# Patient Record
Sex: FEMALE | Race: WHITE
[De-identification: names, ages, dates, MRNs, and addresses within clinical notes are randomized per-mention and may not be internally consistent; named-entity substitution may affect disease eponyms.]

---

## 2020-04-22 ENCOUNTER — HOSPITAL ENCOUNTER (EMERGENCY)
Dept: HOSPITAL 8 - ED | Age: 75
Discharge: HOME | End: 2020-04-22
Payer: COMMERCIAL

## 2020-04-22 VITALS — BODY MASS INDEX: 19.92 KG/M2 | WEIGHT: 108.25 LBS | HEIGHT: 62 IN

## 2020-04-22 VITALS — SYSTOLIC BLOOD PRESSURE: 176 MMHG | DIASTOLIC BLOOD PRESSURE: 77 MMHG

## 2020-04-22 DIAGNOSIS — J44.1: Primary | ICD-10-CM

## 2020-04-22 DIAGNOSIS — R05: ICD-10-CM

## 2020-04-22 LAB
ALBUMIN SERPL-MCNC: 3.7 G/DL (ref 3.4–5)
ANION GAP SERPL CALC-SCNC: 6 MMOL/L (ref 5–15)
BASOPHILS # BLD AUTO: 0.04 X10^3/UL (ref 0–0.1)
BASOPHILS NFR BLD AUTO: 1 % (ref 0–1)
CALCIUM SERPL-MCNC: 9.5 MG/DL (ref 8.5–10.1)
CHLORIDE SERPL-SCNC: 111 MMOL/L (ref 98–107)
CREAT SERPL-MCNC: 1.65 MG/DL (ref 0.55–1.02)
EOSINOPHIL # BLD AUTO: 0.03 X10^3/UL (ref 0–0.4)
EOSINOPHIL NFR BLD AUTO: 0 % (ref 1–7)
ERYTHROCYTE [DISTWIDTH] IN BLOOD BY AUTOMATED COUNT: 16 % (ref 9.6–15.2)
LYMPHOCYTES # BLD AUTO: 1.93 X10^3/UL (ref 1–3.4)
LYMPHOCYTES NFR BLD AUTO: 23 % (ref 22–44)
MCH RBC QN AUTO: 32.1 PG (ref 27–34.8)
MCHC RBC AUTO-ENTMCNC: 33.3 G/DL (ref 32.4–35.8)
MCV RBC AUTO: 96.2 FL (ref 80–100)
MD: NO
MONOCYTES # BLD AUTO: 0.57 X10^3/UL (ref 0.2–0.8)
MONOCYTES NFR BLD AUTO: 7 % (ref 2–9)
NEUTROPHILS # BLD AUTO: 5.7 X10^3/UL (ref 1.8–6.8)
NEUTROPHILS NFR BLD AUTO: 69 % (ref 42–75)
PLATELET # BLD AUTO: 173 X10^3/UL (ref 130–400)
PMV BLD AUTO: 9 FL (ref 7.4–10.4)
RBC # BLD AUTO: 4.41 X10^6/UL (ref 3.82–5.3)

## 2020-04-22 PROCEDURE — 99284 EMERGENCY DEPT VISIT MOD MDM: CPT

## 2020-04-22 PROCEDURE — 80048 BASIC METABOLIC PNL TOTAL CA: CPT

## 2020-04-22 PROCEDURE — 71045 X-RAY EXAM CHEST 1 VIEW: CPT

## 2020-04-22 PROCEDURE — 83605 ASSAY OF LACTIC ACID: CPT

## 2020-04-22 PROCEDURE — 82040 ASSAY OF SERUM ALBUMIN: CPT

## 2020-04-22 PROCEDURE — 85025 COMPLETE CBC W/AUTO DIFF WBC: CPT

## 2020-04-22 PROCEDURE — 36415 COLL VENOUS BLD VENIPUNCTURE: CPT

## 2020-04-22 NOTE — NUR
LAB AT BS.  PT C/O PAIN TO LT FOOT, "IT'S UGLY".  FOOT SLIGHTLY DISCOLORED, 
PEDAL PULSE DIMINISHED, SKIN INTACT.  PT DENIES TRAUMA.  CONGESTED COUGH NOTED 
- REPORTS COUGH X 3-4 DAYS.  RESP EVEN & UNLABORED, SPEECH CLEAR, ABLE TO SPEAK 
IN COMPLETE SENTENCES.  XR NOW AT BS.

## 2020-05-22 ENCOUNTER — HOSPITAL ENCOUNTER (INPATIENT)
Dept: HOSPITAL 8 - ED | Age: 75
LOS: 6 days | Discharge: HOME | DRG: 270 | End: 2020-05-28
Attending: FAMILY MEDICINE | Admitting: HOSPITALIST
Payer: MEDICARE

## 2020-05-22 VITALS — HEIGHT: 61 IN | WEIGHT: 130.07 LBS | BODY MASS INDEX: 24.56 KG/M2

## 2020-05-22 VITALS — DIASTOLIC BLOOD PRESSURE: 80 MMHG | SYSTOLIC BLOOD PRESSURE: 163 MMHG

## 2020-05-22 DIAGNOSIS — J44.9: ICD-10-CM

## 2020-05-22 DIAGNOSIS — I70.202: Primary | ICD-10-CM

## 2020-05-22 DIAGNOSIS — I10: ICD-10-CM

## 2020-05-22 DIAGNOSIS — E46: ICD-10-CM

## 2020-05-22 DIAGNOSIS — R41.0: ICD-10-CM

## 2020-05-22 DIAGNOSIS — I70.8: ICD-10-CM

## 2020-05-22 DIAGNOSIS — L03.116: ICD-10-CM

## 2020-05-22 DIAGNOSIS — Z82.49: ICD-10-CM

## 2020-05-22 DIAGNOSIS — N17.0: ICD-10-CM

## 2020-05-22 DIAGNOSIS — K21.9: ICD-10-CM

## 2020-05-22 DIAGNOSIS — Z66: ICD-10-CM

## 2020-05-22 DIAGNOSIS — E78.5: ICD-10-CM

## 2020-05-22 DIAGNOSIS — Z82.5: ICD-10-CM

## 2020-05-22 DIAGNOSIS — Z20.828: ICD-10-CM

## 2020-05-22 DIAGNOSIS — I74.5: ICD-10-CM

## 2020-05-22 DIAGNOSIS — Z88.0: ICD-10-CM

## 2020-05-22 DIAGNOSIS — Z98.49: ICD-10-CM
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
## 2020-05-22 DIAGNOSIS — F17.210: ICD-10-CM

## 2020-05-22 DIAGNOSIS — Z88.5: ICD-10-CM

## 2020-05-22 LAB
ALBUMIN SERPL-MCNC: 3.5 G/DL (ref 3.4–5)
ANION GAP SERPL CALC-SCNC: 6 MMOL/L (ref 5–15)
BASOPHILS # BLD AUTO: 0.04 X10^3/UL (ref 0–0.1)
BASOPHILS NFR BLD AUTO: 1 % (ref 0–1)
CALCIUM SERPL-MCNC: 9.2 MG/DL (ref 8.5–10.1)
CHLORIDE SERPL-SCNC: 109 MMOL/L (ref 98–107)
CHOL/HDL RATIO: 2.7
CREAT SERPL-MCNC: 1.28 MG/DL (ref 0.55–1.02)
EOSINOPHIL # BLD AUTO: 0.07 X10^3/UL (ref 0–0.4)
EOSINOPHIL NFR BLD AUTO: 1 % (ref 1–7)
ERYTHROCYTE [DISTWIDTH] IN BLOOD BY AUTOMATED COUNT: 17.3 % (ref 9.6–15.2)
HDL CHOL %: 37 % (ref 28–40)
HDL CHOLESTEROL (DIRECT): 58 MG/DL (ref 40–60)
LDL CHOLESTEROL,CALCULATED: 56 MG/DL (ref 54–169)
LDLC/HDLC SERPL: 1 {RATIO} (ref 0.5–3)
LYMPHOCYTES # BLD AUTO: 1.86 X10^3/UL (ref 1–3.4)
LYMPHOCYTES NFR BLD AUTO: 27 % (ref 22–44)
MCH RBC QN AUTO: 32.5 PG (ref 27–34.8)
MCHC RBC AUTO-ENTMCNC: 33.4 G/DL (ref 32.4–35.8)
MCV RBC AUTO: 97.5 FL (ref 80–100)
MD: NO
MONOCYTES # BLD AUTO: 0.45 X10^3/UL (ref 0.2–0.8)
MONOCYTES NFR BLD AUTO: 7 % (ref 2–9)
NEUTROPHILS # BLD AUTO: 4.56 X10^3/UL (ref 1.8–6.8)
NEUTROPHILS NFR BLD AUTO: 65 % (ref 42–75)
PLATELET # BLD AUTO: 282 X10^3/UL (ref 130–400)
PMV BLD AUTO: 9 FL (ref 7.4–10.4)
RBC # BLD AUTO: 4.32 X10^6/UL (ref 3.82–5.3)
TRIGL SERPL-MCNC: 215 MG/DL (ref 50–200)
VLDLC SERPL CALC-MCNC: 43 MG/DL (ref 0–25)

## 2020-05-22 PROCEDURE — 83605 ASSAY OF LACTIC ACID: CPT

## 2020-05-22 PROCEDURE — 88305 TISSUE EXAM BY PATHOLOGIST: CPT

## 2020-05-22 PROCEDURE — 37236 OPEN/PERQ PLACE STENT 1ST: CPT

## 2020-05-22 PROCEDURE — 37221: CPT

## 2020-05-22 PROCEDURE — 85520 HEPARIN ASSAY: CPT

## 2020-05-22 PROCEDURE — C1725 CATH, TRANSLUMIN NON-LASER: HCPCS

## 2020-05-22 PROCEDURE — 84145 PROCALCITONIN (PCT): CPT

## 2020-05-22 PROCEDURE — 80069 RENAL FUNCTION PANEL: CPT

## 2020-05-22 PROCEDURE — 93922 UPR/L XTREMITY ART 2 LEVELS: CPT

## 2020-05-22 PROCEDURE — C1768 GRAFT, VASCULAR: HCPCS

## 2020-05-22 PROCEDURE — C1894 INTRO/SHEATH, NON-LASER: HCPCS

## 2020-05-22 PROCEDURE — 82040 ASSAY OF SERUM ALBUMIN: CPT

## 2020-05-22 PROCEDURE — 85025 COMPLETE CBC W/AUTO DIFF WBC: CPT

## 2020-05-22 PROCEDURE — 96365 THER/PROPH/DIAG IV INF INIT: CPT

## 2020-05-22 PROCEDURE — C1751 CATH, INF, PER/CENT/MIDLINE: HCPCS

## 2020-05-22 PROCEDURE — 36415 COLL VENOUS BLD VENIPUNCTURE: CPT

## 2020-05-22 PROCEDURE — 85347 COAGULATION TIME ACTIVATED: CPT

## 2020-05-22 PROCEDURE — 80048 BASIC METABOLIC PNL TOTAL CA: CPT

## 2020-05-22 PROCEDURE — 87040 BLOOD CULTURE FOR BACTERIA: CPT

## 2020-05-22 PROCEDURE — 96372 THER/PROPH/DIAG INJ SC/IM: CPT

## 2020-05-22 PROCEDURE — 75635 CT ANGIO ABDOMINAL ARTERIES: CPT

## 2020-05-22 PROCEDURE — C1769 GUIDE WIRE: HCPCS

## 2020-05-22 PROCEDURE — 86850 RBC ANTIBODY SCREEN: CPT

## 2020-05-22 PROCEDURE — 93925 LOWER EXTREMITY STUDY: CPT

## 2020-05-22 PROCEDURE — 80061 LIPID PANEL: CPT

## 2020-05-22 PROCEDURE — 96375 TX/PRO/DX INJ NEW DRUG ADDON: CPT

## 2020-05-22 PROCEDURE — C1876 STENT, NON-COA/NON-COV W/DEL: HCPCS

## 2020-05-22 PROCEDURE — 86900 BLOOD TYPING SEROLOGIC ABO: CPT

## 2020-05-22 PROCEDURE — C1874 STENT, COATED/COV W/DEL SYS: HCPCS

## 2020-05-22 PROCEDURE — 99285 EMERGENCY DEPT VISIT HI MDM: CPT

## 2020-05-22 PROCEDURE — 83735 ASSAY OF MAGNESIUM: CPT

## 2020-05-22 PROCEDURE — C1757 CATH, THROMBECTOMY/EMBOLECT: HCPCS

## 2020-05-22 PROCEDURE — 93005 ELECTROCARDIOGRAM TRACING: CPT

## 2020-05-22 PROCEDURE — 96376 TX/PRO/DX INJ SAME DRUG ADON: CPT

## 2020-05-22 PROCEDURE — 80053 COMPREHEN METABOLIC PANEL: CPT

## 2020-05-22 RX ADMIN — MEROPENEM SCH MLS/HR: 1 INJECTION, POWDER, FOR SOLUTION INTRAVENOUS at 18:07

## 2020-05-22 RX ADMIN — SODIUM CHLORIDE SCH MLS/HR: 0.9 INJECTION, SOLUTION INTRAVENOUS at 17:21

## 2020-05-22 RX ADMIN — MORPHINE SULFATE PRN MG: 4 INJECTION INTRAVENOUS at 15:00

## 2020-05-22 RX ADMIN — MORPHINE SULFATE PRN MG: 4 INJECTION INTRAVENOUS at 16:23

## 2020-05-22 RX ADMIN — HEPARIN SODIUM PRN MLS/HR: 10000 INJECTION, SOLUTION INTRAVENOUS at 22:38

## 2020-05-22 RX ADMIN — NICOTINE SCH PATCH: 21 PATCH, EXTENDED RELEASE TRANSDERMAL at 17:16

## 2020-05-23 VITALS — DIASTOLIC BLOOD PRESSURE: 72 MMHG | SYSTOLIC BLOOD PRESSURE: 175 MMHG

## 2020-05-23 VITALS — DIASTOLIC BLOOD PRESSURE: 67 MMHG | SYSTOLIC BLOOD PRESSURE: 160 MMHG

## 2020-05-23 VITALS — DIASTOLIC BLOOD PRESSURE: 67 MMHG | SYSTOLIC BLOOD PRESSURE: 152 MMHG

## 2020-05-23 VITALS — DIASTOLIC BLOOD PRESSURE: 70 MMHG | SYSTOLIC BLOOD PRESSURE: 175 MMHG

## 2020-05-23 LAB
ALBUMIN SERPL-MCNC: 2.8 G/DL (ref 3.4–5)
ALP SERPL-CCNC: 65 U/L (ref 45–117)
ALT SERPL-CCNC: 16 U/L (ref 12–78)
ANION GAP SERPL CALC-SCNC: 8 MMOL/L (ref 5–15)
BASOPHILS # BLD AUTO: 0.06 X10^3/UL (ref 0–0.1)
BASOPHILS NFR BLD AUTO: 1 % (ref 0–1)
BILIRUB SERPL-MCNC: 0.4 MG/DL (ref 0.2–1)
CALCIUM SERPL-MCNC: 8.4 MG/DL (ref 8.5–10.1)
CHLORIDE SERPL-SCNC: 113 MMOL/L (ref 98–107)
CREAT SERPL-MCNC: 1.07 MG/DL (ref 0.55–1.02)
EOSINOPHIL # BLD AUTO: 0.1 X10^3/UL (ref 0–0.4)
EOSINOPHIL NFR BLD AUTO: 1 % (ref 1–7)
ERYTHROCYTE [DISTWIDTH] IN BLOOD BY AUTOMATED COUNT: 17 % (ref 9.6–15.2)
LYMPHOCYTES # BLD AUTO: 1.75 X10^3/UL (ref 1–3.4)
LYMPHOCYTES NFR BLD AUTO: 25 % (ref 22–44)
MCH RBC QN AUTO: 32.5 PG (ref 27–34.8)
MCHC RBC AUTO-ENTMCNC: 32.8 G/DL (ref 32.4–35.8)
MCV RBC AUTO: 99 FL (ref 80–100)
MD: NO
MONOCYTES # BLD AUTO: 0.53 X10^3/UL (ref 0.2–0.8)
MONOCYTES NFR BLD AUTO: 8 % (ref 2–9)
NEUTROPHILS # BLD AUTO: 4.6 X10^3/UL (ref 1.8–6.8)
NEUTROPHILS NFR BLD AUTO: 65 % (ref 42–75)
PLATELET # BLD AUTO: 224 X10^3/UL (ref 130–400)
PMV BLD AUTO: 8.4 FL (ref 7.4–10.4)
PROT SERPL-MCNC: 6.5 G/DL (ref 6.4–8.2)
RBC # BLD AUTO: 3.76 X10^6/UL (ref 3.82–5.3)

## 2020-05-23 RX ADMIN — OXYCODONE HYDROCHLORIDE PRN MG: 5 TABLET ORAL at 15:41

## 2020-05-23 RX ADMIN — NICOTINE SCH PATCH: 21 PATCH, EXTENDED RELEASE TRANSDERMAL at 16:53

## 2020-05-23 RX ADMIN — ATORVASTATIN CALCIUM SCH MG: 40 TABLET, FILM COATED ORAL at 21:09

## 2020-05-23 RX ADMIN — PAROXETINE HYDROCHLORIDE HEMIHYDRATE SCH MG: 20 TABLET, FILM COATED ORAL at 07:44

## 2020-05-23 RX ADMIN — AMLODIPINE BESYLATE SCH MG: 10 TABLET ORAL at 08:47

## 2020-05-23 RX ADMIN — OXYCODONE HYDROCHLORIDE PRN MG: 5 TABLET ORAL at 21:33

## 2020-05-23 RX ADMIN — OMEPRAZOLE SCH MG: 20 CAPSULE, DELAYED RELEASE ORAL at 07:44

## 2020-05-23 RX ADMIN — MEROPENEM SCH MLS/HR: 1 INJECTION, POWDER, FOR SOLUTION INTRAVENOUS at 02:29

## 2020-05-23 RX ADMIN — MEROPENEM SCH MLS/HR: 1 INJECTION, POWDER, FOR SOLUTION INTRAVENOUS at 17:28

## 2020-05-23 RX ADMIN — MORPHINE SULFATE PRN MG: 10 INJECTION INTRAVENOUS at 04:47

## 2020-05-23 RX ADMIN — SODIUM CHLORIDE SCH MLS/HR: 0.9 INJECTION, SOLUTION INTRAVENOUS at 16:53

## 2020-05-23 RX ADMIN — MEROPENEM SCH MLS/HR: 1 INJECTION, POWDER, FOR SOLUTION INTRAVENOUS at 10:09

## 2020-05-23 RX ADMIN — SODIUM CHLORIDE SCH MLS/HR: 0.9 INJECTION, SOLUTION INTRAVENOUS at 05:46

## 2020-05-23 RX ADMIN — MORPHINE SULFATE PRN MG: 10 INJECTION INTRAVENOUS at 07:57

## 2020-05-23 RX ADMIN — HEPARIN SODIUM PRN UNITS: 5000 INJECTION, SOLUTION INTRAVENOUS; SUBCUTANEOUS at 21:08

## 2020-05-23 RX ADMIN — HEPARIN SODIUM PRN UNITS: 5000 INJECTION, SOLUTION INTRAVENOUS; SUBCUTANEOUS at 06:19

## 2020-05-24 VITALS — DIASTOLIC BLOOD PRESSURE: 70 MMHG | SYSTOLIC BLOOD PRESSURE: 177 MMHG

## 2020-05-24 VITALS — SYSTOLIC BLOOD PRESSURE: 192 MMHG | DIASTOLIC BLOOD PRESSURE: 64 MMHG

## 2020-05-24 VITALS — SYSTOLIC BLOOD PRESSURE: 128 MMHG | DIASTOLIC BLOOD PRESSURE: 71 MMHG

## 2020-05-24 VITALS — DIASTOLIC BLOOD PRESSURE: 61 MMHG | SYSTOLIC BLOOD PRESSURE: 160 MMHG

## 2020-05-24 VITALS — SYSTOLIC BLOOD PRESSURE: 117 MMHG | DIASTOLIC BLOOD PRESSURE: 61 MMHG

## 2020-05-24 LAB
ALBUMIN SERPL-MCNC: 2.7 G/DL (ref 3.4–5)
ALP SERPL-CCNC: 63 U/L (ref 45–117)
ALT SERPL-CCNC: 13 U/L (ref 12–78)
ANION GAP SERPL CALC-SCNC: 8 MMOL/L (ref 5–15)
BASOPHILS # BLD AUTO: 0.02 X10^3/UL (ref 0–0.1)
BASOPHILS NFR BLD AUTO: 0 % (ref 0–1)
BILIRUB SERPL-MCNC: 0.5 MG/DL (ref 0.2–1)
CALCIUM SERPL-MCNC: 8.4 MG/DL (ref 8.5–10.1)
CHLORIDE SERPL-SCNC: 111 MMOL/L (ref 98–107)
CREAT SERPL-MCNC: 0.94 MG/DL (ref 0.55–1.02)
EOSINOPHIL # BLD AUTO: 0.1 X10^3/UL (ref 0–0.4)
EOSINOPHIL NFR BLD AUTO: 1 % (ref 1–7)
ERYTHROCYTE [DISTWIDTH] IN BLOOD BY AUTOMATED COUNT: 16.3 % (ref 9.6–15.2)
LYMPHOCYTES # BLD AUTO: 1.37 X10^3/UL (ref 1–3.4)
LYMPHOCYTES NFR BLD AUTO: 19 % (ref 22–44)
MCH RBC QN AUTO: 32.4 PG (ref 27–34.8)
MCHC RBC AUTO-ENTMCNC: 33.5 G/DL (ref 32.4–35.8)
MCV RBC AUTO: 96.7 FL (ref 80–100)
MD: NO
MONOCYTES # BLD AUTO: 0.55 X10^3/UL (ref 0.2–0.8)
MONOCYTES NFR BLD AUTO: 8 % (ref 2–9)
NEUTROPHILS # BLD AUTO: 5 X10^3/UL (ref 1.8–6.8)
NEUTROPHILS NFR BLD AUTO: 71 % (ref 42–75)
PLATELET # BLD AUTO: 231 X10^3/UL (ref 130–400)
PMV BLD AUTO: 8.6 FL (ref 7.4–10.4)
PROT SERPL-MCNC: 6.1 G/DL (ref 6.4–8.2)
RBC # BLD AUTO: 3.71 X10^6/UL (ref 3.82–5.3)

## 2020-05-24 RX ADMIN — HEPARIN SODIUM PRN UNITS: 5000 INJECTION, SOLUTION INTRAVENOUS; SUBCUTANEOUS at 03:33

## 2020-05-24 RX ADMIN — OXYCODONE HYDROCHLORIDE PRN MG: 5 TABLET ORAL at 14:31

## 2020-05-24 RX ADMIN — NICOTINE SCH PATCH: 21 PATCH, EXTENDED RELEASE TRANSDERMAL at 16:55

## 2020-05-24 RX ADMIN — HEPARIN SODIUM PRN MLS/HR: 10000 INJECTION, SOLUTION INTRAVENOUS at 04:31

## 2020-05-24 RX ADMIN — SODIUM CHLORIDE SCH MLS/HR: 0.9 INJECTION, SOLUTION INTRAVENOUS at 22:09

## 2020-05-24 RX ADMIN — SODIUM CHLORIDE SCH MLS/HR: 0.9 INJECTION, SOLUTION INTRAVENOUS at 02:11

## 2020-05-24 RX ADMIN — MEROPENEM SCH MLS/HR: 1 INJECTION, POWDER, FOR SOLUTION INTRAVENOUS at 22:05

## 2020-05-24 RX ADMIN — OMEPRAZOLE SCH MG: 20 CAPSULE, DELAYED RELEASE ORAL at 08:30

## 2020-05-24 RX ADMIN — PAROXETINE HYDROCHLORIDE HEMIHYDRATE SCH MG: 20 TABLET, FILM COATED ORAL at 08:31

## 2020-05-24 RX ADMIN — HEPARIN SODIUM PRN UNITS: 5000 INJECTION, SOLUTION INTRAVENOUS; SUBCUTANEOUS at 16:51

## 2020-05-24 RX ADMIN — SODIUM CHLORIDE SCH MLS/HR: 0.9 INJECTION, SOLUTION INTRAVENOUS at 12:25

## 2020-05-24 RX ADMIN — AMLODIPINE BESYLATE SCH MG: 10 TABLET ORAL at 08:30

## 2020-05-24 RX ADMIN — MEROPENEM SCH MLS/HR: 1 INJECTION, POWDER, FOR SOLUTION INTRAVENOUS at 02:11

## 2020-05-24 RX ADMIN — OXYCODONE HYDROCHLORIDE PRN MG: 5 TABLET ORAL at 20:54

## 2020-05-24 RX ADMIN — ATORVASTATIN CALCIUM SCH MG: 40 TABLET, FILM COATED ORAL at 20:54

## 2020-05-24 RX ADMIN — MEROPENEM SCH MLS/HR: 1 INJECTION, POWDER, FOR SOLUTION INTRAVENOUS at 10:20

## 2020-05-25 VITALS — DIASTOLIC BLOOD PRESSURE: 65 MMHG | SYSTOLIC BLOOD PRESSURE: 191 MMHG

## 2020-05-25 VITALS — DIASTOLIC BLOOD PRESSURE: 67 MMHG | SYSTOLIC BLOOD PRESSURE: 127 MMHG

## 2020-05-25 VITALS — SYSTOLIC BLOOD PRESSURE: 151 MMHG | DIASTOLIC BLOOD PRESSURE: 74 MMHG

## 2020-05-25 VITALS — SYSTOLIC BLOOD PRESSURE: 127 MMHG | DIASTOLIC BLOOD PRESSURE: 74 MMHG

## 2020-05-25 VITALS — SYSTOLIC BLOOD PRESSURE: 149 MMHG | DIASTOLIC BLOOD PRESSURE: 70 MMHG

## 2020-05-25 LAB
ALBUMIN SERPL-MCNC: 2.9 G/DL (ref 3.4–5)
ALP SERPL-CCNC: 66 U/L (ref 45–117)
ALT SERPL-CCNC: 18 U/L (ref 12–78)
ANION GAP SERPL CALC-SCNC: 7 MMOL/L (ref 5–15)
BASOPHILS # BLD AUTO: 0.04 X10^3/UL (ref 0–0.1)
BASOPHILS NFR BLD AUTO: 1 % (ref 0–1)
BILIRUB SERPL-MCNC: 0.7 MG/DL (ref 0.2–1)
CALCIUM SERPL-MCNC: 8.5 MG/DL (ref 8.5–10.1)
CHLORIDE SERPL-SCNC: 112 MMOL/L (ref 98–107)
CREAT SERPL-MCNC: 1 MG/DL (ref 0.55–1.02)
EOSINOPHIL # BLD AUTO: 0.16 X10^3/UL (ref 0–0.4)
EOSINOPHIL NFR BLD AUTO: 2 % (ref 1–7)
ERYTHROCYTE [DISTWIDTH] IN BLOOD BY AUTOMATED COUNT: 17 % (ref 9.6–15.2)
LYMPHOCYTES # BLD AUTO: 2.08 X10^3/UL (ref 1–3.4)
LYMPHOCYTES NFR BLD AUTO: 27 % (ref 22–44)
MCH RBC QN AUTO: 32.2 PG (ref 27–34.8)
MCHC RBC AUTO-ENTMCNC: 32.9 G/DL (ref 32.4–35.8)
MCV RBC AUTO: 97.7 FL (ref 80–100)
MD: NO
MONOCYTES # BLD AUTO: 0.64 X10^3/UL (ref 0.2–0.8)
MONOCYTES NFR BLD AUTO: 8 % (ref 2–9)
NEUTROPHILS # BLD AUTO: 4.86 X10^3/UL (ref 1.8–6.8)
NEUTROPHILS NFR BLD AUTO: 63 % (ref 42–75)
PLATELET # BLD AUTO: 246 X10^3/UL (ref 130–400)
PMV BLD AUTO: 9 FL (ref 7.4–10.4)
PROT SERPL-MCNC: 6.6 G/DL (ref 6.4–8.2)
RBC # BLD AUTO: 3.74 X10^6/UL (ref 3.82–5.3)

## 2020-05-25 PROCEDURE — 04UK0KZ SUPPLEMENT RIGHT FEMORAL ARTERY WITH NONAUTOLOGOUS TISSUE SUBSTITUTE, OPEN APPROACH: ICD-10-PCS | Performed by: SURGERY

## 2020-05-25 PROCEDURE — 04CE0ZZ EXTIRPATION OF MATTER FROM RIGHT INTERNAL ILIAC ARTERY, OPEN APPROACH: ICD-10-PCS | Performed by: SURGERY

## 2020-05-25 PROCEDURE — 047H3DZ DILATION OF RIGHT EXTERNAL ILIAC ARTERY WITH INTRALUMINAL DEVICE, PERCUTANEOUS APPROACH: ICD-10-PCS | Performed by: SURGERY

## 2020-05-25 PROCEDURE — 04CC0ZZ EXTIRPATION OF MATTER FROM RIGHT COMMON ILIAC ARTERY, OPEN APPROACH: ICD-10-PCS | Performed by: SURGERY

## 2020-05-25 PROCEDURE — B44LZZ3 ULTRASONOGRAPHY OF FEMORAL ARTERY, INTRAVASCULAR: ICD-10-PCS | Performed by: SURGERY

## 2020-05-25 PROCEDURE — 04CK0ZZ EXTIRPATION OF MATTER FROM RIGHT FEMORAL ARTERY, OPEN APPROACH: ICD-10-PCS | Performed by: SURGERY

## 2020-05-25 RX ADMIN — AMLODIPINE BESYLATE SCH MG: 10 TABLET ORAL at 09:00

## 2020-05-25 RX ADMIN — SODIUM CHLORIDE SCH MLS/HR: 0.9 INJECTION, SOLUTION INTRAVENOUS at 17:21

## 2020-05-25 RX ADMIN — NICOTINE SCH PATCH: 21 PATCH, EXTENDED RELEASE TRANSDERMAL at 17:21

## 2020-05-25 RX ADMIN — SODIUM CHLORIDE SCH MLS/HR: 0.9 INJECTION, SOLUTION INTRAVENOUS at 15:49

## 2020-05-25 RX ADMIN — CLOPIDOGREL SCH MG: 75 TABLET, FILM COATED ORAL at 17:21

## 2020-05-25 RX ADMIN — OMEPRAZOLE SCH MG: 20 CAPSULE, DELAYED RELEASE ORAL at 09:00

## 2020-05-25 RX ADMIN — ATORVASTATIN CALCIUM SCH MG: 40 TABLET, FILM COATED ORAL at 21:53

## 2020-05-25 RX ADMIN — HEPARIN SODIUM PRN MLS/HR: 10000 INJECTION, SOLUTION INTRAVENOUS at 05:39

## 2020-05-25 RX ADMIN — POTASSIUM CHLORIDE SCH MLS/HR: 2 INJECTION, SOLUTION, CONCENTRATE INTRAVENOUS at 18:15

## 2020-05-25 RX ADMIN — PAROXETINE HYDROCHLORIDE HEMIHYDRATE SCH MG: 20 TABLET, FILM COATED ORAL at 09:00

## 2020-05-25 RX ADMIN — OXYCODONE HYDROCHLORIDE PRN MG: 5 TABLET ORAL at 18:56

## 2020-05-25 RX ADMIN — MEROPENEM SCH MLS/HR: 1 INJECTION, POWDER, FOR SOLUTION INTRAVENOUS at 17:22

## 2020-05-26 VITALS — SYSTOLIC BLOOD PRESSURE: 122 MMHG | DIASTOLIC BLOOD PRESSURE: 62 MMHG

## 2020-05-26 VITALS — DIASTOLIC BLOOD PRESSURE: 66 MMHG | SYSTOLIC BLOOD PRESSURE: 122 MMHG

## 2020-05-26 VITALS — SYSTOLIC BLOOD PRESSURE: 120 MMHG | DIASTOLIC BLOOD PRESSURE: 67 MMHG

## 2020-05-26 VITALS — DIASTOLIC BLOOD PRESSURE: 64 MMHG | SYSTOLIC BLOOD PRESSURE: 123 MMHG

## 2020-05-26 LAB
ALBUMIN SERPL-MCNC: 2.3 G/DL (ref 3.4–5)
ANION GAP SERPL CALC-SCNC: 8 MMOL/L (ref 5–15)
BASOPHILS # BLD AUTO: 0.01 X10^3/UL (ref 0–0.1)
BASOPHILS NFR BLD AUTO: 0 % (ref 0–1)
CALCIUM SERPL-MCNC: 7.8 MG/DL (ref 8.5–10.1)
CHLORIDE SERPL-SCNC: 118 MMOL/L (ref 98–107)
CREAT SERPL-MCNC: 0.87 MG/DL (ref 0.55–1.02)
EOSINOPHIL # BLD AUTO: 0.01 X10^3/UL (ref 0–0.4)
EOSINOPHIL NFR BLD AUTO: 0 % (ref 1–7)
ERYTHROCYTE [DISTWIDTH] IN BLOOD BY AUTOMATED COUNT: 17.2 % (ref 9.6–15.2)
LYMPHOCYTES # BLD AUTO: 0.74 X10^3/UL (ref 1–3.4)
LYMPHOCYTES NFR BLD AUTO: 9 % (ref 22–44)
MCH RBC QN AUTO: 33 PG (ref 27–34.8)
MCHC RBC AUTO-ENTMCNC: 33.3 G/DL (ref 32.4–35.8)
MCV RBC AUTO: 99.1 FL (ref 80–100)
MD: (no result)
MONOCYTES # BLD AUTO: 0.38 X10^3/UL (ref 0.2–0.8)
MONOCYTES NFR BLD AUTO: 5 % (ref 2–9)
NEUTROPHILS # BLD AUTO: 7.17 X10^3/UL (ref 1.8–6.8)
NEUTROPHILS NFR BLD AUTO: 86 % (ref 42–75)
PLATELET # BLD AUTO: 199 X10^3/UL (ref 130–400)
PMV BLD AUTO: 8.5 FL (ref 7.4–10.4)
RBC # BLD AUTO: 3.02 X10^6/UL (ref 3.82–5.3)

## 2020-05-26 RX ADMIN — POTASSIUM CHLORIDE SCH MLS/HR: 2 INJECTION, SOLUTION, CONCENTRATE INTRAVENOUS at 18:06

## 2020-05-26 RX ADMIN — OMEPRAZOLE SCH MG: 20 CAPSULE, DELAYED RELEASE ORAL at 08:38

## 2020-05-26 RX ADMIN — OXYCODONE HYDROCHLORIDE PRN MG: 5 TABLET ORAL at 18:53

## 2020-05-26 RX ADMIN — MEROPENEM SCH MLS/HR: 1 INJECTION, POWDER, FOR SOLUTION INTRAVENOUS at 04:57

## 2020-05-26 RX ADMIN — MEROPENEM SCH MLS/HR: 1 INJECTION, POWDER, FOR SOLUTION INTRAVENOUS at 17:09

## 2020-05-26 RX ADMIN — AMLODIPINE BESYLATE SCH MG: 10 TABLET ORAL at 08:38

## 2020-05-26 RX ADMIN — CLOPIDOGREL SCH MG: 75 TABLET, FILM COATED ORAL at 08:39

## 2020-05-26 RX ADMIN — NICOTINE SCH PATCH: 21 PATCH, EXTENDED RELEASE TRANSDERMAL at 17:09

## 2020-05-26 RX ADMIN — OXYCODONE HYDROCHLORIDE PRN MG: 5 TABLET ORAL at 00:37

## 2020-05-26 RX ADMIN — SODIUM CHLORIDE SCH MLS/HR: 0.9 INJECTION, SOLUTION INTRAVENOUS at 18:06

## 2020-05-26 RX ADMIN — ATORVASTATIN CALCIUM SCH MG: 40 TABLET, FILM COATED ORAL at 21:55

## 2020-05-26 RX ADMIN — ENOXAPARIN SODIUM SCH MG: 40 INJECTION SUBCUTANEOUS at 21:55

## 2020-05-26 RX ADMIN — POTASSIUM CHLORIDE SCH MLS/HR: 2 INJECTION, SOLUTION, CONCENTRATE INTRAVENOUS at 06:30

## 2020-05-26 RX ADMIN — OXYCODONE HYDROCHLORIDE PRN MG: 5 TABLET ORAL at 14:02

## 2020-05-26 RX ADMIN — SODIUM CHLORIDE SCH MLS/HR: 0.9 INJECTION, SOLUTION INTRAVENOUS at 06:30

## 2020-05-26 RX ADMIN — PAROXETINE HYDROCHLORIDE HEMIHYDRATE SCH MG: 20 TABLET, FILM COATED ORAL at 08:39

## 2020-05-27 VITALS — DIASTOLIC BLOOD PRESSURE: 71 MMHG | SYSTOLIC BLOOD PRESSURE: 153 MMHG

## 2020-05-27 VITALS — SYSTOLIC BLOOD PRESSURE: 135 MMHG | DIASTOLIC BLOOD PRESSURE: 77 MMHG

## 2020-05-27 VITALS — DIASTOLIC BLOOD PRESSURE: 67 MMHG | SYSTOLIC BLOOD PRESSURE: 135 MMHG

## 2020-05-27 VITALS — SYSTOLIC BLOOD PRESSURE: 154 MMHG | DIASTOLIC BLOOD PRESSURE: 82 MMHG

## 2020-05-27 VITALS — SYSTOLIC BLOOD PRESSURE: 169 MMHG | DIASTOLIC BLOOD PRESSURE: 78 MMHG

## 2020-05-27 RX ADMIN — OXYCODONE HYDROCHLORIDE PRN MG: 5 TABLET ORAL at 13:06

## 2020-05-27 RX ADMIN — ATORVASTATIN CALCIUM SCH MG: 40 TABLET, FILM COATED ORAL at 21:07

## 2020-05-27 RX ADMIN — OXYCODONE HYDROCHLORIDE PRN MG: 5 TABLET ORAL at 05:57

## 2020-05-27 RX ADMIN — MEROPENEM SCH MLS/HR: 1 INJECTION, POWDER, FOR SOLUTION INTRAVENOUS at 16:47

## 2020-05-27 RX ADMIN — SODIUM CHLORIDE SCH MLS/HR: 0.9 INJECTION, SOLUTION INTRAVENOUS at 05:53

## 2020-05-27 RX ADMIN — POTASSIUM CHLORIDE SCH MLS/HR: 2 INJECTION, SOLUTION, CONCENTRATE INTRAVENOUS at 17:58

## 2020-05-27 RX ADMIN — POTASSIUM CHLORIDE SCH MLS/HR: 2 INJECTION, SOLUTION, CONCENTRATE INTRAVENOUS at 09:00

## 2020-05-27 RX ADMIN — OXYCODONE HYDROCHLORIDE PRN MG: 5 TABLET ORAL at 21:07

## 2020-05-27 RX ADMIN — AMLODIPINE BESYLATE SCH MG: 10 TABLET ORAL at 08:35

## 2020-05-27 RX ADMIN — NICOTINE SCH PATCH: 21 PATCH, EXTENDED RELEASE TRANSDERMAL at 16:51

## 2020-05-27 RX ADMIN — SODIUM CHLORIDE SCH MLS/HR: 0.9 INJECTION, SOLUTION INTRAVENOUS at 16:55

## 2020-05-27 RX ADMIN — MEROPENEM SCH MLS/HR: 1 INJECTION, POWDER, FOR SOLUTION INTRAVENOUS at 04:43

## 2020-05-27 RX ADMIN — CLOPIDOGREL SCH MG: 75 TABLET, FILM COATED ORAL at 08:35

## 2020-05-27 RX ADMIN — POTASSIUM CHLORIDE SCH MLS/HR: 2 INJECTION, SOLUTION, CONCENTRATE INTRAVENOUS at 05:53

## 2020-05-27 RX ADMIN — ENOXAPARIN SODIUM SCH MG: 40 INJECTION SUBCUTANEOUS at 21:07

## 2020-05-27 RX ADMIN — OMEPRAZOLE SCH MG: 20 CAPSULE, DELAYED RELEASE ORAL at 08:35

## 2020-05-27 RX ADMIN — PAROXETINE HYDROCHLORIDE HEMIHYDRATE SCH MG: 20 TABLET, FILM COATED ORAL at 08:35

## 2020-05-28 VITALS — DIASTOLIC BLOOD PRESSURE: 70 MMHG | SYSTOLIC BLOOD PRESSURE: 101 MMHG

## 2020-05-28 VITALS — SYSTOLIC BLOOD PRESSURE: 150 MMHG | DIASTOLIC BLOOD PRESSURE: 69 MMHG

## 2020-05-28 VITALS — SYSTOLIC BLOOD PRESSURE: 164 MMHG | DIASTOLIC BLOOD PRESSURE: 83 MMHG

## 2020-05-28 VITALS — DIASTOLIC BLOOD PRESSURE: 73 MMHG | SYSTOLIC BLOOD PRESSURE: 156 MMHG

## 2020-05-28 VITALS — SYSTOLIC BLOOD PRESSURE: 147 MMHG | DIASTOLIC BLOOD PRESSURE: 78 MMHG

## 2020-05-28 LAB
<PLATELET ESTIMATE>: ADEQUATE
ANISOCYTOSIS BLD QL SMEAR: (no result)
ERYTHROCYTE [DISTWIDTH] IN BLOOD BY AUTOMATED COUNT: 16.9 % (ref 9.6–15.2)
GIANT PLATELETS BLD QL SMEAR: (no result)
LG PLATELETS BLD QL SMEAR: (no result)
LYMPH#(MANUAL): 1.78 X10^3/UL (ref 1–3.4)
LYMPHS% (MANUAL): 18 % (ref 22–44)
MCH RBC QN AUTO: 32.8 PG (ref 27–34.8)
MCHC RBC AUTO-ENTMCNC: 33.3 G/DL (ref 32.4–35.8)
MCV RBC AUTO: 98.6 FL (ref 80–100)
MD: YES
MONOS#(MANUAL): 0.3 X10^3/UL (ref 0.3–2.7)
MONOS% (MANUAL): 3 % (ref 2–9)
PLATELET # BLD AUTO: 206 X10^3/UL (ref 130–400)
PMV BLD AUTO: 9 FL (ref 7.4–10.4)
POLYCHROMASIA BLD QL SMEAR: (no result)
RBC # BLD AUTO: 3.13 X10^6/UL (ref 3.82–5.3)
SEG#(MANUAL): 7.82 X10^3/UL (ref 1.8–6.8)
SEGS% (MANUAL): 79 % (ref 42–75)

## 2020-05-28 RX ADMIN — OXYCODONE HYDROCHLORIDE PRN MG: 5 TABLET ORAL at 04:59

## 2020-05-28 RX ADMIN — OMEPRAZOLE SCH MG: 20 CAPSULE, DELAYED RELEASE ORAL at 09:35

## 2020-05-28 RX ADMIN — MEROPENEM SCH MLS/HR: 1 INJECTION, POWDER, FOR SOLUTION INTRAVENOUS at 04:50

## 2020-05-28 RX ADMIN — AMLODIPINE BESYLATE SCH MG: 10 TABLET ORAL at 09:35

## 2020-05-28 RX ADMIN — PAROXETINE HYDROCHLORIDE HEMIHYDRATE SCH MG: 20 TABLET, FILM COATED ORAL at 09:37

## 2020-05-28 RX ADMIN — OXYCODONE HYDROCHLORIDE PRN MG: 5 TABLET ORAL at 09:40

## 2020-05-28 RX ADMIN — POTASSIUM CHLORIDE SCH MLS/HR: 2 INJECTION, SOLUTION, CONCENTRATE INTRAVENOUS at 05:41

## 2020-05-28 RX ADMIN — SODIUM CHLORIDE SCH MLS/HR: 0.9 INJECTION, SOLUTION INTRAVENOUS at 17:34

## 2020-05-28 RX ADMIN — SODIUM CHLORIDE SCH MLS/HR: 0.9 INJECTION, SOLUTION INTRAVENOUS at 08:29

## 2020-05-28 RX ADMIN — NICOTINE SCH PATCH: 21 PATCH, EXTENDED RELEASE TRANSDERMAL at 16:06

## 2020-05-28 RX ADMIN — CLOPIDOGREL SCH MG: 75 TABLET, FILM COATED ORAL at 09:37

## 2020-06-03 ENCOUNTER — HOSPITAL ENCOUNTER (INPATIENT)
Dept: HOSPITAL 8 - ED | Age: 75
LOS: 6 days | Discharge: HOME HEALTH | DRG: 255 | End: 2020-06-09
Attending: INTERNAL MEDICINE | Admitting: HOSPITALIST
Payer: MEDICARE

## 2020-06-03 VITALS — WEIGHT: 102.51 LBS | HEIGHT: 62 IN | BODY MASS INDEX: 18.86 KG/M2

## 2020-06-03 VITALS — DIASTOLIC BLOOD PRESSURE: 59 MMHG | SYSTOLIC BLOOD PRESSURE: 162 MMHG

## 2020-06-03 DIAGNOSIS — N17.0: ICD-10-CM

## 2020-06-03 DIAGNOSIS — I10: ICD-10-CM

## 2020-06-03 DIAGNOSIS — L03.116: ICD-10-CM

## 2020-06-03 DIAGNOSIS — D64.9: ICD-10-CM

## 2020-06-03 DIAGNOSIS — Z88.0: ICD-10-CM

## 2020-06-03 DIAGNOSIS — M86.8X7: ICD-10-CM

## 2020-06-03 DIAGNOSIS — I96: Primary | ICD-10-CM

## 2020-06-03 DIAGNOSIS — L97.524: ICD-10-CM

## 2020-06-03 DIAGNOSIS — Z88.5: ICD-10-CM

## 2020-06-03 DIAGNOSIS — Z98.49: ICD-10-CM

## 2020-06-03 DIAGNOSIS — L03.032: ICD-10-CM

## 2020-06-03 DIAGNOSIS — Z66: ICD-10-CM

## 2020-06-03 DIAGNOSIS — E78.5: ICD-10-CM

## 2020-06-03 DIAGNOSIS — Z82.49: ICD-10-CM

## 2020-06-03 DIAGNOSIS — J44.9: ICD-10-CM

## 2020-06-03 DIAGNOSIS — Z89.429: ICD-10-CM

## 2020-06-03 DIAGNOSIS — F17.210: ICD-10-CM

## 2020-06-03 LAB
ALBUMIN SERPL-MCNC: 3 G/DL (ref 3.4–5)
ALP SERPL-CCNC: 85 U/L (ref 45–117)
ALT SERPL-CCNC: 22 U/L (ref 12–78)
ANION GAP SERPL CALC-SCNC: 7 MMOL/L (ref 5–15)
BASOPHILS # BLD AUTO: 0.04 X10^3/UL (ref 0–0.1)
BASOPHILS NFR BLD AUTO: 0 % (ref 0–1)
BILIRUB SERPL-MCNC: 0.5 MG/DL (ref 0.2–1)
CALCIUM SERPL-MCNC: 9.2 MG/DL (ref 8.5–10.1)
CHLORIDE SERPL-SCNC: 112 MMOL/L (ref 98–107)
CREAT SERPL-MCNC: 1.29 MG/DL (ref 0.55–1.02)
EOSINOPHIL # BLD AUTO: 0.1 X10^3/UL (ref 0–0.4)
EOSINOPHIL NFR BLD AUTO: 1 % (ref 1–7)
ERYTHROCYTE [DISTWIDTH] IN BLOOD BY AUTOMATED COUNT: 16.5 % (ref 9.6–15.2)
LYMPHOCYTES # BLD AUTO: 1.88 X10^3/UL (ref 1–3.4)
LYMPHOCYTES NFR BLD AUTO: 21 % (ref 22–44)
MCH RBC QN AUTO: 32.2 PG (ref 27–34.8)
MCHC RBC AUTO-ENTMCNC: 33 G/DL (ref 32.4–35.8)
MCV RBC AUTO: 97.4 FL (ref 80–100)
MD: (no result)
MONOCYTES # BLD AUTO: 0.59 X10^3/UL (ref 0.2–0.8)
MONOCYTES NFR BLD AUTO: 7 % (ref 2–9)
NEUTROPHILS # BLD AUTO: 6.33 X10^3/UL (ref 1.8–6.8)
NEUTROPHILS NFR BLD AUTO: 71 % (ref 42–75)
PLATELET # BLD AUTO: 330 X10^3/UL (ref 130–400)
PMV BLD AUTO: 8.7 FL (ref 7.4–10.4)
PROT SERPL-MCNC: 7.1 G/DL (ref 6.4–8.2)
RBC # BLD AUTO: 3.4 X10^6/UL (ref 3.82–5.3)

## 2020-06-03 PROCEDURE — 80053 COMPREHEN METABOLIC PANEL: CPT

## 2020-06-03 PROCEDURE — 88311 DECALCIFY TISSUE: CPT

## 2020-06-03 PROCEDURE — 36415 COLL VENOUS BLD VENIPUNCTURE: CPT

## 2020-06-03 PROCEDURE — 88305 TISSUE EXAM BY PATHOLOGIST: CPT

## 2020-06-03 PROCEDURE — 83605 ASSAY OF LACTIC ACID: CPT

## 2020-06-03 PROCEDURE — 93005 ELECTROCARDIOGRAM TRACING: CPT

## 2020-06-03 PROCEDURE — 99285 EMERGENCY DEPT VISIT HI MDM: CPT

## 2020-06-03 PROCEDURE — 87040 BLOOD CULTURE FOR BACTERIA: CPT

## 2020-06-03 PROCEDURE — 85025 COMPLETE CBC W/AUTO DIFF WBC: CPT

## 2020-06-03 PROCEDURE — 80048 BASIC METABOLIC PNL TOTAL CA: CPT

## 2020-06-03 PROCEDURE — 71045 X-RAY EXAM CHEST 1 VIEW: CPT

## 2020-06-03 RX ADMIN — HEPARIN SODIUM SCH UNITS: 5000 INJECTION, SOLUTION INTRAVENOUS; SUBCUTANEOUS at 23:13

## 2020-06-03 RX ADMIN — OXYCODONE HYDROCHLORIDE PRN MG: 5 TABLET ORAL at 23:14

## 2020-06-03 RX ADMIN — NICOTINE SCH PATCH: 21 PATCH, EXTENDED RELEASE TRANSDERMAL at 23:13

## 2020-06-03 RX ADMIN — SODIUM CHLORIDE, PRESERVATIVE FREE SCH ML: 5 INJECTION INTRAVENOUS at 23:15

## 2020-06-03 RX ADMIN — SIMVASTATIN SCH MG: 20 TABLET, FILM COATED ORAL at 23:15

## 2020-06-04 VITALS — DIASTOLIC BLOOD PRESSURE: 74 MMHG | SYSTOLIC BLOOD PRESSURE: 154 MMHG

## 2020-06-04 VITALS — DIASTOLIC BLOOD PRESSURE: 63 MMHG | SYSTOLIC BLOOD PRESSURE: 176 MMHG

## 2020-06-04 VITALS — SYSTOLIC BLOOD PRESSURE: 152 MMHG | DIASTOLIC BLOOD PRESSURE: 49 MMHG

## 2020-06-04 VITALS — DIASTOLIC BLOOD PRESSURE: 67 MMHG | SYSTOLIC BLOOD PRESSURE: 132 MMHG

## 2020-06-04 VITALS — SYSTOLIC BLOOD PRESSURE: 183 MMHG | DIASTOLIC BLOOD PRESSURE: 61 MMHG

## 2020-06-04 VITALS — SYSTOLIC BLOOD PRESSURE: 163 MMHG | DIASTOLIC BLOOD PRESSURE: 62 MMHG

## 2020-06-04 LAB
ANION GAP SERPL CALC-SCNC: 9 MMOL/L (ref 5–15)
BASOPHILS # BLD AUTO: 0.06 X10^3/UL (ref 0–0.1)
BASOPHILS NFR BLD AUTO: 1 % (ref 0–1)
CALCIUM SERPL-MCNC: 8.6 MG/DL (ref 8.5–10.1)
CHLORIDE SERPL-SCNC: 113 MMOL/L (ref 98–107)
CREAT SERPL-MCNC: 1.1 MG/DL (ref 0.55–1.02)
EOSINOPHIL # BLD AUTO: 0.16 X10^3/UL (ref 0–0.4)
EOSINOPHIL NFR BLD AUTO: 2 % (ref 1–7)
ERYTHROCYTE [DISTWIDTH] IN BLOOD BY AUTOMATED COUNT: 16.5 % (ref 9.6–15.2)
LYMPHOCYTES # BLD AUTO: 1.96 X10^3/UL (ref 1–3.4)
LYMPHOCYTES NFR BLD AUTO: 25 % (ref 22–44)
MCH RBC QN AUTO: 32 PG (ref 27–34.8)
MCHC RBC AUTO-ENTMCNC: 32.6 G/DL (ref 32.4–35.8)
MCV RBC AUTO: 98.1 FL (ref 80–100)
MD: NO
MONOCYTES # BLD AUTO: 0.52 X10^3/UL (ref 0.2–0.8)
MONOCYTES NFR BLD AUTO: 7 % (ref 2–9)
NEUTROPHILS # BLD AUTO: 5.11 X10^3/UL (ref 1.8–6.8)
NEUTROPHILS NFR BLD AUTO: 66 % (ref 42–75)
PLATELET # BLD AUTO: 280 X10^3/UL (ref 130–400)
PMV BLD AUTO: 8.3 FL (ref 7.4–10.4)
RBC # BLD AUTO: 3.08 X10^6/UL (ref 3.82–5.3)

## 2020-06-04 RX ADMIN — NICOTINE SCH PATCH: 21 PATCH, EXTENDED RELEASE TRANSDERMAL at 23:38

## 2020-06-04 RX ADMIN — SODIUM CHLORIDE, PRESERVATIVE FREE SCH ML: 5 INJECTION INTRAVENOUS at 20:05

## 2020-06-04 RX ADMIN — CLINDAMYCIN IN 5 PERCENT DEXTROSE SCH MLS/HR: 12 INJECTION, SOLUTION INTRAVENOUS at 12:24

## 2020-06-04 RX ADMIN — CLINDAMYCIN IN 5 PERCENT DEXTROSE SCH MLS/HR: 12 INJECTION, SOLUTION INTRAVENOUS at 20:05

## 2020-06-04 RX ADMIN — AMLODIPINE BESYLATE SCH MG: 10 TABLET ORAL at 07:49

## 2020-06-04 RX ADMIN — CLINDAMYCIN IN 5 PERCENT DEXTROSE SCH MLS/HR: 12 INJECTION, SOLUTION INTRAVENOUS at 04:19

## 2020-06-04 RX ADMIN — SODIUM CHLORIDE, PRESERVATIVE FREE SCH ML: 5 INJECTION INTRAVENOUS at 07:49

## 2020-06-04 RX ADMIN — OMEPRAZOLE SCH MG: 20 CAPSULE, DELAYED RELEASE ORAL at 07:48

## 2020-06-04 RX ADMIN — SIMVASTATIN SCH MG: 20 TABLET, FILM COATED ORAL at 20:05

## 2020-06-04 RX ADMIN — HEPARIN SODIUM SCH UNITS: 5000 INJECTION, SOLUTION INTRAVENOUS; SUBCUTANEOUS at 07:48

## 2020-06-04 RX ADMIN — DOCUSATE SODIUM 50MG AND SENNOSIDES 8.6MG SCH TAB: 8.6; 5 TABLET, FILM COATED ORAL at 07:47

## 2020-06-04 RX ADMIN — HEPARIN SODIUM SCH UNITS: 5000 INJECTION, SOLUTION INTRAVENOUS; SUBCUTANEOUS at 23:38

## 2020-06-04 RX ADMIN — PAROXETINE HYDROCHLORIDE HEMIHYDRATE SCH MG: 20 TABLET, FILM COATED ORAL at 07:48

## 2020-06-04 RX ADMIN — HEPARIN SODIUM SCH UNITS: 5000 INJECTION, SOLUTION INTRAVENOUS; SUBCUTANEOUS at 16:31

## 2020-06-05 VITALS — DIASTOLIC BLOOD PRESSURE: 61 MMHG | SYSTOLIC BLOOD PRESSURE: 104 MMHG

## 2020-06-05 VITALS — DIASTOLIC BLOOD PRESSURE: 75 MMHG | SYSTOLIC BLOOD PRESSURE: 130 MMHG

## 2020-06-05 VITALS — SYSTOLIC BLOOD PRESSURE: 127 MMHG | DIASTOLIC BLOOD PRESSURE: 63 MMHG

## 2020-06-05 VITALS — SYSTOLIC BLOOD PRESSURE: 148 MMHG | DIASTOLIC BLOOD PRESSURE: 65 MMHG

## 2020-06-05 VITALS — SYSTOLIC BLOOD PRESSURE: 115 MMHG | DIASTOLIC BLOOD PRESSURE: 59 MMHG

## 2020-06-05 PROCEDURE — 0Y6Q0Z0 DETACHMENT AT LEFT 1ST TOE, COMPLETE, OPEN APPROACH: ICD-10-PCS | Performed by: SURGERY

## 2020-06-05 RX ADMIN — OXYCODONE HYDROCHLORIDE PRN MG: 5 TABLET ORAL at 09:23

## 2020-06-05 RX ADMIN — DOCUSATE SODIUM 50MG AND SENNOSIDES 8.6MG SCH TAB: 8.6; 5 TABLET, FILM COATED ORAL at 09:23

## 2020-06-05 RX ADMIN — AMLODIPINE BESYLATE SCH MG: 10 TABLET ORAL at 09:22

## 2020-06-05 RX ADMIN — DOCUSATE SODIUM 50MG AND SENNOSIDES 8.6MG SCH TAB: 8.6; 5 TABLET, FILM COATED ORAL at 09:00

## 2020-06-05 RX ADMIN — SODIUM CHLORIDE, PRESERVATIVE FREE SCH ML: 5 INJECTION INTRAVENOUS at 09:22

## 2020-06-05 RX ADMIN — PAROXETINE HYDROCHLORIDE HEMIHYDRATE SCH MG: 20 TABLET, FILM COATED ORAL at 09:22

## 2020-06-05 RX ADMIN — HEPARIN SODIUM SCH UNITS: 5000 INJECTION, SOLUTION INTRAVENOUS; SUBCUTANEOUS at 15:53

## 2020-06-05 RX ADMIN — CLINDAMYCIN IN 5 PERCENT DEXTROSE SCH MLS/HR: 12 INJECTION, SOLUTION INTRAVENOUS at 04:38

## 2020-06-05 RX ADMIN — SIMVASTATIN SCH MG: 20 TABLET, FILM COATED ORAL at 21:37

## 2020-06-05 RX ADMIN — SODIUM CHLORIDE, PRESERVATIVE FREE SCH ML: 5 INJECTION INTRAVENOUS at 21:38

## 2020-06-05 RX ADMIN — CLINDAMYCIN IN 5 PERCENT DEXTROSE SCH MLS/HR: 12 INJECTION, SOLUTION INTRAVENOUS at 20:42

## 2020-06-05 RX ADMIN — OMEPRAZOLE SCH MG: 20 CAPSULE, DELAYED RELEASE ORAL at 09:22

## 2020-06-05 RX ADMIN — CLINDAMYCIN IN 5 PERCENT DEXTROSE SCH MLS/HR: 12 INJECTION, SOLUTION INTRAVENOUS at 12:15

## 2020-06-05 RX ADMIN — HEPARIN SODIUM SCH UNITS: 5000 INJECTION, SOLUTION INTRAVENOUS; SUBCUTANEOUS at 09:22

## 2020-06-05 RX ADMIN — INSULIN LISPRO SCH NOTE: 100 INJECTION, SOLUTION INTRAVENOUS; SUBCUTANEOUS at 18:30

## 2020-06-05 RX ADMIN — NICOTINE SCH PATCH: 21 PATCH, EXTENDED RELEASE TRANSDERMAL at 21:39

## 2020-06-06 VITALS — DIASTOLIC BLOOD PRESSURE: 59 MMHG | SYSTOLIC BLOOD PRESSURE: 116 MMHG

## 2020-06-06 VITALS — DIASTOLIC BLOOD PRESSURE: 66 MMHG | SYSTOLIC BLOOD PRESSURE: 117 MMHG

## 2020-06-06 VITALS — SYSTOLIC BLOOD PRESSURE: 125 MMHG | DIASTOLIC BLOOD PRESSURE: 52 MMHG

## 2020-06-06 VITALS — DIASTOLIC BLOOD PRESSURE: 66 MMHG | SYSTOLIC BLOOD PRESSURE: 114 MMHG

## 2020-06-06 LAB
ANION GAP SERPL CALC-SCNC: 10 MMOL/L (ref 5–15)
BASOPHILS # BLD AUTO: 0.01 X10^3/UL (ref 0–0.1)
BASOPHILS NFR BLD AUTO: 0 % (ref 0–1)
CALCIUM SERPL-MCNC: 9.3 MG/DL (ref 8.5–10.1)
CHLORIDE SERPL-SCNC: 110 MMOL/L (ref 98–107)
CREAT SERPL-MCNC: 1.46 MG/DL (ref 0.55–1.02)
EOSINOPHIL # BLD AUTO: 0 X10^3/UL (ref 0–0.4)
EOSINOPHIL NFR BLD AUTO: 0 % (ref 1–7)
ERYTHROCYTE [DISTWIDTH] IN BLOOD BY AUTOMATED COUNT: 16.3 % (ref 9.6–15.2)
LYMPHOCYTES # BLD AUTO: 0.63 X10^3/UL (ref 1–3.4)
LYMPHOCYTES NFR BLD AUTO: 11 % (ref 22–44)
MCH RBC QN AUTO: 32.3 PG (ref 27–34.8)
MCHC RBC AUTO-ENTMCNC: 32.9 G/DL (ref 32.4–35.8)
MCV RBC AUTO: 98.2 FL (ref 80–100)
MD: (no result)
MONOCYTES # BLD AUTO: 0.11 X10^3/UL (ref 0.2–0.8)
MONOCYTES NFR BLD AUTO: 2 % (ref 2–9)
NEUTROPHILS # BLD AUTO: 5.24 X10^3/UL (ref 1.8–6.8)
NEUTROPHILS NFR BLD AUTO: 88 % (ref 42–75)
PLATELET # BLD AUTO: 304 X10^3/UL (ref 130–400)
PMV BLD AUTO: 8 FL (ref 7.4–10.4)
RBC # BLD AUTO: 3.02 X10^6/UL (ref 3.82–5.3)

## 2020-06-06 RX ADMIN — CLINDAMYCIN IN 5 PERCENT DEXTROSE SCH MLS/HR: 12 INJECTION, SOLUTION INTRAVENOUS at 12:13

## 2020-06-06 RX ADMIN — INSULIN LISPRO SCH NOTE: 100 INJECTION, SOLUTION INTRAVENOUS; SUBCUTANEOUS at 10:30

## 2020-06-06 RX ADMIN — HEPARIN SODIUM SCH UNITS: 5000 INJECTION, SOLUTION INTRAVENOUS; SUBCUTANEOUS at 08:57

## 2020-06-06 RX ADMIN — OXYCODONE HYDROCHLORIDE PRN MG: 5 TABLET ORAL at 20:26

## 2020-06-06 RX ADMIN — DOCUSATE SODIUM 50MG AND SENNOSIDES 8.6MG SCH TAB: 8.6; 5 TABLET, FILM COATED ORAL at 08:58

## 2020-06-06 RX ADMIN — NYSTATIN AND TRIAMCINOLONE ACETONIDE SCH APPLIC: 100000; 1 CREAM TOPICAL at 16:00

## 2020-06-06 RX ADMIN — PAROXETINE HYDROCHLORIDE HEMIHYDRATE SCH MG: 20 TABLET, FILM COATED ORAL at 08:58

## 2020-06-06 RX ADMIN — CLINDAMYCIN IN 5 PERCENT DEXTROSE SCH MLS/HR: 12 INJECTION, SOLUTION INTRAVENOUS at 20:22

## 2020-06-06 RX ADMIN — AMLODIPINE BESYLATE SCH MG: 10 TABLET ORAL at 08:58

## 2020-06-06 RX ADMIN — SIMVASTATIN SCH MG: 20 TABLET, FILM COATED ORAL at 21:24

## 2020-06-06 RX ADMIN — SODIUM CHLORIDE, PRESERVATIVE FREE SCH ML: 5 INJECTION INTRAVENOUS at 21:25

## 2020-06-06 RX ADMIN — CLINDAMYCIN IN 5 PERCENT DEXTROSE SCH MLS/HR: 12 INJECTION, SOLUTION INTRAVENOUS at 03:58

## 2020-06-06 RX ADMIN — SODIUM CHLORIDE, PRESERVATIVE FREE SCH ML: 5 INJECTION INTRAVENOUS at 09:05

## 2020-06-06 RX ADMIN — INSULIN LISPRO SCH NOTE: 100 INJECTION, SOLUTION INTRAVENOUS; SUBCUTANEOUS at 02:30

## 2020-06-06 RX ADMIN — NYSTATIN AND TRIAMCINOLONE ACETONIDE SCH APPLIC: 100000; 1 CREAM TOPICAL at 12:35

## 2020-06-06 RX ADMIN — NYSTATIN AND TRIAMCINOLONE ACETONIDE SCH APPLIC: 100000; 1 CREAM TOPICAL at 21:00

## 2020-06-06 RX ADMIN — HEPARIN SODIUM SCH UNITS: 5000 INJECTION, SOLUTION INTRAVENOUS; SUBCUTANEOUS at 00:46

## 2020-06-06 RX ADMIN — HEPARIN SODIUM SCH UNITS: 5000 INJECTION, SOLUTION INTRAVENOUS; SUBCUTANEOUS at 17:26

## 2020-06-06 RX ADMIN — NICOTINE SCH PATCH: 21 PATCH, EXTENDED RELEASE TRANSDERMAL at 22:40

## 2020-06-06 RX ADMIN — INSULIN LISPRO SCH NOTE: 100 INJECTION, SOLUTION INTRAVENOUS; SUBCUTANEOUS at 18:30

## 2020-06-06 RX ADMIN — OMEPRAZOLE SCH MG: 20 CAPSULE, DELAYED RELEASE ORAL at 08:58

## 2020-06-07 VITALS — DIASTOLIC BLOOD PRESSURE: 52 MMHG | SYSTOLIC BLOOD PRESSURE: 125 MMHG

## 2020-06-07 VITALS — DIASTOLIC BLOOD PRESSURE: 75 MMHG | SYSTOLIC BLOOD PRESSURE: 130 MMHG

## 2020-06-07 VITALS — DIASTOLIC BLOOD PRESSURE: 62 MMHG | SYSTOLIC BLOOD PRESSURE: 127 MMHG

## 2020-06-07 VITALS — DIASTOLIC BLOOD PRESSURE: 58 MMHG | SYSTOLIC BLOOD PRESSURE: 117 MMHG

## 2020-06-07 LAB
ANION GAP SERPL CALC-SCNC: 10 MMOL/L (ref 5–15)
BASOPHILS # BLD AUTO: 0.04 X10^3/UL (ref 0–0.1)
BASOPHILS NFR BLD AUTO: 1 % (ref 0–1)
CALCIUM SERPL-MCNC: 9.2 MG/DL (ref 8.5–10.1)
CHLORIDE SERPL-SCNC: 110 MMOL/L (ref 98–107)
CREAT SERPL-MCNC: 1.46 MG/DL (ref 0.55–1.02)
EOSINOPHIL # BLD AUTO: 0.06 X10^3/UL (ref 0–0.4)
EOSINOPHIL NFR BLD AUTO: 1 % (ref 1–7)
ERYTHROCYTE [DISTWIDTH] IN BLOOD BY AUTOMATED COUNT: 16.5 % (ref 9.6–15.2)
LYMPHOCYTES # BLD AUTO: 1.82 X10^3/UL (ref 1–3.4)
LYMPHOCYTES NFR BLD AUTO: 24 % (ref 22–44)
MCH RBC QN AUTO: 32 PG (ref 27–34.8)
MCHC RBC AUTO-ENTMCNC: 32.5 G/DL (ref 32.4–35.8)
MCV RBC AUTO: 98.3 FL (ref 80–100)
MD: NO
MONOCYTES # BLD AUTO: 0.42 X10^3/UL (ref 0.2–0.8)
MONOCYTES NFR BLD AUTO: 6 % (ref 2–9)
NEUTROPHILS # BLD AUTO: 5.4 X10^3/UL (ref 1.8–6.8)
NEUTROPHILS NFR BLD AUTO: 70 % (ref 42–75)
PLATELET # BLD AUTO: 297 X10^3/UL (ref 130–400)
PMV BLD AUTO: 8.3 FL (ref 7.4–10.4)
RBC # BLD AUTO: 3.14 X10^6/UL (ref 3.82–5.3)

## 2020-06-07 RX ADMIN — CLINDAMYCIN IN 5 PERCENT DEXTROSE SCH MLS/HR: 12 INJECTION, SOLUTION INTRAVENOUS at 20:23

## 2020-06-07 RX ADMIN — INSULIN LISPRO SCH NOTE: 100 INJECTION, SOLUTION INTRAVENOUS; SUBCUTANEOUS at 18:30

## 2020-06-07 RX ADMIN — SIMVASTATIN SCH MG: 20 TABLET, FILM COATED ORAL at 20:24

## 2020-06-07 RX ADMIN — NYSTATIN AND TRIAMCINOLONE ACETONIDE SCH APPLIC: 100000; 1 CREAM TOPICAL at 20:26

## 2020-06-07 RX ADMIN — AMLODIPINE BESYLATE SCH MG: 10 TABLET ORAL at 09:29

## 2020-06-07 RX ADMIN — SODIUM CHLORIDE, PRESERVATIVE FREE SCH ML: 5 INJECTION INTRAVENOUS at 20:26

## 2020-06-07 RX ADMIN — NYSTATIN AND TRIAMCINOLONE ACETONIDE SCH APPLIC: 100000; 1 CREAM TOPICAL at 11:00

## 2020-06-07 RX ADMIN — NICOTINE SCH PATCH: 21 PATCH, EXTENDED RELEASE TRANSDERMAL at 22:36

## 2020-06-07 RX ADMIN — CLINDAMYCIN IN 5 PERCENT DEXTROSE SCH MLS/HR: 12 INJECTION, SOLUTION INTRAVENOUS at 04:09

## 2020-06-07 RX ADMIN — OMEPRAZOLE SCH MG: 20 CAPSULE, DELAYED RELEASE ORAL at 09:29

## 2020-06-07 RX ADMIN — OXYCODONE HYDROCHLORIDE PRN MG: 5 TABLET ORAL at 20:24

## 2020-06-07 RX ADMIN — NYSTATIN AND TRIAMCINOLONE ACETONIDE SCH APPLIC: 100000; 1 CREAM TOPICAL at 16:00

## 2020-06-07 RX ADMIN — HEPARIN SODIUM SCH UNITS: 5000 INJECTION, SOLUTION INTRAVENOUS; SUBCUTANEOUS at 09:33

## 2020-06-07 RX ADMIN — INSULIN LISPRO SCH NOTE: 100 INJECTION, SOLUTION INTRAVENOUS; SUBCUTANEOUS at 10:30

## 2020-06-07 RX ADMIN — NYSTATIN AND TRIAMCINOLONE ACETONIDE SCH APPLIC: 100000; 1 CREAM TOPICAL at 05:37

## 2020-06-07 RX ADMIN — HEPARIN SODIUM SCH UNITS: 5000 INJECTION, SOLUTION INTRAVENOUS; SUBCUTANEOUS at 01:22

## 2020-06-07 RX ADMIN — DOCUSATE SODIUM 50MG AND SENNOSIDES 8.6MG SCH TAB: 8.6; 5 TABLET, FILM COATED ORAL at 09:00

## 2020-06-07 RX ADMIN — INSULIN LISPRO SCH NOTE: 100 INJECTION, SOLUTION INTRAVENOUS; SUBCUTANEOUS at 02:30

## 2020-06-07 RX ADMIN — PAROXETINE HYDROCHLORIDE HEMIHYDRATE SCH MG: 20 TABLET, FILM COATED ORAL at 09:29

## 2020-06-07 RX ADMIN — SODIUM CHLORIDE, PRESERVATIVE FREE SCH ML: 5 INJECTION INTRAVENOUS at 09:30

## 2020-06-07 RX ADMIN — OXYCODONE HYDROCHLORIDE PRN MG: 5 TABLET ORAL at 09:49

## 2020-06-07 RX ADMIN — CLINDAMYCIN IN 5 PERCENT DEXTROSE SCH MLS/HR: 12 INJECTION, SOLUTION INTRAVENOUS at 12:17

## 2020-06-07 RX ADMIN — HEPARIN SODIUM SCH UNITS: 5000 INJECTION, SOLUTION INTRAVENOUS; SUBCUTANEOUS at 17:05

## 2020-06-08 VITALS — DIASTOLIC BLOOD PRESSURE: 61 MMHG | SYSTOLIC BLOOD PRESSURE: 119 MMHG

## 2020-06-08 VITALS — SYSTOLIC BLOOD PRESSURE: 130 MMHG | DIASTOLIC BLOOD PRESSURE: 72 MMHG

## 2020-06-08 VITALS — SYSTOLIC BLOOD PRESSURE: 134 MMHG | DIASTOLIC BLOOD PRESSURE: 62 MMHG

## 2020-06-08 VITALS — DIASTOLIC BLOOD PRESSURE: 82 MMHG | SYSTOLIC BLOOD PRESSURE: 125 MMHG

## 2020-06-08 LAB
ANION GAP SERPL CALC-SCNC: 9 MMOL/L (ref 5–15)
BASOPHILS # BLD AUTO: 0.02 X10^3/UL (ref 0–0.1)
BASOPHILS NFR BLD AUTO: 0 % (ref 0–1)
CALCIUM SERPL-MCNC: 9.3 MG/DL (ref 8.5–10.1)
CHLORIDE SERPL-SCNC: 108 MMOL/L (ref 98–107)
CREAT SERPL-MCNC: 1.29 MG/DL (ref 0.55–1.02)
EOSINOPHIL # BLD AUTO: 0.09 X10^3/UL (ref 0–0.4)
EOSINOPHIL NFR BLD AUTO: 1 % (ref 1–7)
ERYTHROCYTE [DISTWIDTH] IN BLOOD BY AUTOMATED COUNT: 16.3 % (ref 9.6–15.2)
LYMPHOCYTES # BLD AUTO: 2.27 X10^3/UL (ref 1–3.4)
LYMPHOCYTES NFR BLD AUTO: 30 % (ref 22–44)
MCH RBC QN AUTO: 31.7 PG (ref 27–34.8)
MCHC RBC AUTO-ENTMCNC: 32.2 G/DL (ref 32.4–35.8)
MCV RBC AUTO: 98.4 FL (ref 80–100)
MD: NO
MONOCYTES # BLD AUTO: 0.43 X10^3/UL (ref 0.2–0.8)
MONOCYTES NFR BLD AUTO: 6 % (ref 2–9)
NEUTROPHILS # BLD AUTO: 4.67 X10^3/UL (ref 1.8–6.8)
NEUTROPHILS NFR BLD AUTO: 62 % (ref 42–75)
PLATELET # BLD AUTO: 312 X10^3/UL (ref 130–400)
PMV BLD AUTO: 8.9 FL (ref 7.4–10.4)
RBC # BLD AUTO: 3.28 X10^6/UL (ref 3.82–5.3)

## 2020-06-08 RX ADMIN — NYSTATIN AND TRIAMCINOLONE ACETONIDE SCH APPLIC: 100000; 1 CREAM TOPICAL at 21:26

## 2020-06-08 RX ADMIN — CLINDAMYCIN IN 5 PERCENT DEXTROSE SCH MLS/HR: 12 INJECTION, SOLUTION INTRAVENOUS at 04:04

## 2020-06-08 RX ADMIN — DOCUSATE SODIUM 50MG AND SENNOSIDES 8.6MG SCH TAB: 8.6; 5 TABLET, FILM COATED ORAL at 09:00

## 2020-06-08 RX ADMIN — SODIUM CHLORIDE, PRESERVATIVE FREE SCH ML: 5 INJECTION INTRAVENOUS at 09:09

## 2020-06-08 RX ADMIN — NICOTINE SCH PATCH: 21 PATCH, EXTENDED RELEASE TRANSDERMAL at 21:26

## 2020-06-08 RX ADMIN — NYSTATIN AND TRIAMCINOLONE ACETONIDE SCH APPLIC: 100000; 1 CREAM TOPICAL at 16:00

## 2020-06-08 RX ADMIN — HEPARIN SODIUM SCH UNITS: 5000 INJECTION, SOLUTION INTRAVENOUS; SUBCUTANEOUS at 02:00

## 2020-06-08 RX ADMIN — HEPARIN SODIUM SCH UNITS: 5000 INJECTION, SOLUTION INTRAVENOUS; SUBCUTANEOUS at 17:00

## 2020-06-08 RX ADMIN — INSULIN LISPRO SCH NOTE: 100 INJECTION, SOLUTION INTRAVENOUS; SUBCUTANEOUS at 01:10

## 2020-06-08 RX ADMIN — SODIUM CHLORIDE, PRESERVATIVE FREE SCH ML: 5 INJECTION INTRAVENOUS at 19:48

## 2020-06-08 RX ADMIN — OMEPRAZOLE SCH MG: 20 CAPSULE, DELAYED RELEASE ORAL at 09:09

## 2020-06-08 RX ADMIN — CLINDAMYCIN IN 5 PERCENT DEXTROSE SCH MLS/HR: 12 INJECTION, SOLUTION INTRAVENOUS at 11:45

## 2020-06-08 RX ADMIN — NYSTATIN AND TRIAMCINOLONE ACETONIDE SCH APPLIC: 100000; 1 CREAM TOPICAL at 11:00

## 2020-06-08 RX ADMIN — HEPARIN SODIUM SCH UNITS: 5000 INJECTION, SOLUTION INTRAVENOUS; SUBCUTANEOUS at 09:12

## 2020-06-08 RX ADMIN — AMLODIPINE BESYLATE SCH MG: 10 TABLET ORAL at 09:09

## 2020-06-08 RX ADMIN — OXYCODONE HYDROCHLORIDE PRN MG: 5 TABLET ORAL at 19:46

## 2020-06-08 RX ADMIN — PAROXETINE HYDROCHLORIDE HEMIHYDRATE SCH MG: 20 TABLET, FILM COATED ORAL at 09:09

## 2020-06-08 RX ADMIN — SODIUM CHLORIDE SCH MLS/HR: 0.9 INJECTION, SOLUTION INTRAVENOUS at 17:05

## 2020-06-08 RX ADMIN — SIMVASTATIN SCH MG: 20 TABLET, FILM COATED ORAL at 19:47

## 2020-06-08 RX ADMIN — CLINDAMYCIN IN 5 PERCENT DEXTROSE SCH MLS/HR: 12 INJECTION, SOLUTION INTRAVENOUS at 19:46

## 2020-06-08 RX ADMIN — OXYCODONE HYDROCHLORIDE PRN MG: 5 TABLET ORAL at 13:52

## 2020-06-08 RX ADMIN — NYSTATIN AND TRIAMCINOLONE ACETONIDE SCH APPLIC: 100000; 1 CREAM TOPICAL at 06:00

## 2020-06-09 VITALS — DIASTOLIC BLOOD PRESSURE: 58 MMHG | SYSTOLIC BLOOD PRESSURE: 162 MMHG

## 2020-06-09 VITALS — SYSTOLIC BLOOD PRESSURE: 107 MMHG | DIASTOLIC BLOOD PRESSURE: 72 MMHG

## 2020-06-09 VITALS — DIASTOLIC BLOOD PRESSURE: 66 MMHG | SYSTOLIC BLOOD PRESSURE: 152 MMHG

## 2020-06-09 LAB
ANION GAP SERPL CALC-SCNC: 6 MMOL/L (ref 5–15)
BASOPHILS # BLD AUTO: 0.05 X10^3/UL (ref 0–0.1)
BASOPHILS NFR BLD AUTO: 1 % (ref 0–1)
CALCIUM SERPL-MCNC: 8.5 MG/DL (ref 8.5–10.1)
CHLORIDE SERPL-SCNC: 111 MMOL/L (ref 98–107)
CREAT SERPL-MCNC: 1.17 MG/DL (ref 0.55–1.02)
EOSINOPHIL # BLD AUTO: 0.11 X10^3/UL (ref 0–0.4)
EOSINOPHIL NFR BLD AUTO: 2 % (ref 1–7)
ERYTHROCYTE [DISTWIDTH] IN BLOOD BY AUTOMATED COUNT: 16.1 % (ref 9.6–15.2)
LYMPHOCYTES # BLD AUTO: 1.9 X10^3/UL (ref 1–3.4)
LYMPHOCYTES NFR BLD AUTO: 28 % (ref 22–44)
MCH RBC QN AUTO: 32.6 PG (ref 27–34.8)
MCHC RBC AUTO-ENTMCNC: 33.5 G/DL (ref 32.4–35.8)
MCV RBC AUTO: 97.3 FL (ref 80–100)
MD: NO
MONOCYTES # BLD AUTO: 0.5 X10^3/UL (ref 0.2–0.8)
MONOCYTES NFR BLD AUTO: 7 % (ref 2–9)
NEUTROPHILS # BLD AUTO: 4.3 X10^3/UL (ref 1.8–6.8)
NEUTROPHILS NFR BLD AUTO: 63 % (ref 42–75)
PLATELET # BLD AUTO: 250 X10^3/UL (ref 130–400)
PMV BLD AUTO: 8.9 FL (ref 7.4–10.4)
RBC # BLD AUTO: 2.91 X10^6/UL (ref 3.82–5.3)

## 2020-06-09 RX ADMIN — PAROXETINE HYDROCHLORIDE HEMIHYDRATE SCH MG: 20 TABLET, FILM COATED ORAL at 08:15

## 2020-06-09 RX ADMIN — OMEPRAZOLE SCH MG: 20 CAPSULE, DELAYED RELEASE ORAL at 08:16

## 2020-06-09 RX ADMIN — HEPARIN SODIUM SCH UNITS: 5000 INJECTION, SOLUTION INTRAVENOUS; SUBCUTANEOUS at 01:22

## 2020-06-09 RX ADMIN — SODIUM CHLORIDE SCH MLS/HR: 0.9 INJECTION, SOLUTION INTRAVENOUS at 08:17

## 2020-06-09 RX ADMIN — SODIUM CHLORIDE, PRESERVATIVE FREE SCH ML: 5 INJECTION INTRAVENOUS at 08:15

## 2020-06-09 RX ADMIN — AMLODIPINE BESYLATE SCH MG: 10 TABLET ORAL at 08:17

## 2020-06-09 RX ADMIN — NYSTATIN AND TRIAMCINOLONE ACETONIDE SCH APPLIC: 100000; 1 CREAM TOPICAL at 04:37

## 2020-06-09 RX ADMIN — HEPARIN SODIUM SCH UNITS: 5000 INJECTION, SOLUTION INTRAVENOUS; SUBCUTANEOUS at 09:46

## 2020-06-09 RX ADMIN — CLINDAMYCIN IN 5 PERCENT DEXTROSE SCH MLS/HR: 12 INJECTION, SOLUTION INTRAVENOUS at 12:07

## 2020-06-09 RX ADMIN — CLINDAMYCIN IN 5 PERCENT DEXTROSE SCH MLS/HR: 12 INJECTION, SOLUTION INTRAVENOUS at 04:01

## 2020-06-09 RX ADMIN — DOCUSATE SODIUM 50MG AND SENNOSIDES 8.6MG SCH TAB: 8.6; 5 TABLET, FILM COATED ORAL at 08:16

## 2020-06-09 RX ADMIN — NYSTATIN AND TRIAMCINOLONE ACETONIDE SCH APPLIC: 100000; 1 CREAM TOPICAL at 11:16

## 2020-06-09 RX ADMIN — SODIUM CHLORIDE SCH MLS/HR: 0.9 INJECTION, SOLUTION INTRAVENOUS at 01:22

## 2020-06-09 RX ADMIN — NYSTATIN AND TRIAMCINOLONE ACETONIDE SCH APPLIC: 100000; 1 CREAM TOPICAL at 16:00

## 2021-01-15 DIAGNOSIS — Z23 NEED FOR VACCINATION: ICD-10-CM

## 2025-03-15 ENCOUNTER — APPOINTMENT (OUTPATIENT)
Dept: RADIOLOGY | Facility: MEDICAL CENTER | Age: 80
DRG: 064 | End: 2025-03-15
Attending: EMERGENCY MEDICINE
Payer: MEDICARE

## 2025-03-15 ENCOUNTER — HOSPITAL ENCOUNTER (INPATIENT)
Facility: MEDICAL CENTER | Age: 80
LOS: 3 days | DRG: 064 | End: 2025-03-20
Attending: EMERGENCY MEDICINE | Admitting: INTERNAL MEDICINE
Payer: MEDICARE

## 2025-03-15 ENCOUNTER — APPOINTMENT (OUTPATIENT)
Dept: RADIOLOGY | Facility: MEDICAL CENTER | Age: 80
DRG: 064 | End: 2025-03-15
Attending: INTERNAL MEDICINE
Payer: MEDICARE

## 2025-03-15 DIAGNOSIS — R53.1 WEAKNESS: ICD-10-CM

## 2025-03-15 DIAGNOSIS — I63.9 CEREBROVASCULAR ACCIDENT (CVA), UNSPECIFIED MECHANISM (HCC): ICD-10-CM

## 2025-03-15 PROBLEM — R07.9 CHEST PAIN: Status: ACTIVE | Noted: 2025-03-15

## 2025-03-15 PROBLEM — Z71.89 ACP (ADVANCE CARE PLANNING): Status: ACTIVE | Noted: 2025-03-15

## 2025-03-15 PROBLEM — E46 MALNUTRITION (HCC): Status: ACTIVE | Noted: 2025-03-15

## 2025-03-15 PROBLEM — N17.9 AKI (ACUTE KIDNEY INJURY) (HCC): Status: ACTIVE | Noted: 2025-03-15

## 2025-03-15 PROBLEM — I16.1 HYPERTENSIVE EMERGENCY: Status: ACTIVE | Noted: 2025-03-15

## 2025-03-15 PROBLEM — F17.200 SMOKING: Status: ACTIVE | Noted: 2025-03-15

## 2025-03-15 LAB
ALBUMIN SERPL BCP-MCNC: 4.3 G/DL (ref 3.2–4.9)
ALBUMIN/GLOB SERPL: 1.4 G/DL
ALP SERPL-CCNC: 60 U/L (ref 30–99)
ALT SERPL-CCNC: <5 U/L (ref 2–50)
ANION GAP SERPL CALC-SCNC: 13 MMOL/L (ref 7–16)
APPEARANCE UR: ABNORMAL
APPEARANCE UR: CLEAR
APTT PPP: 29.5 SEC (ref 24.7–36)
AST SERPL-CCNC: 22 U/L (ref 12–45)
BACTERIA #/AREA URNS HPF: ABNORMAL /HPF
BACTERIA #/AREA URNS HPF: ABNORMAL /HPF
BASOPHILS # BLD AUTO: 0.8 % (ref 0–1.8)
BASOPHILS # BLD: 0.04 K/UL (ref 0–0.12)
BILIRUB SERPL-MCNC: 0.7 MG/DL (ref 0.1–1.5)
BILIRUB UR QL STRIP.AUTO: NEGATIVE
BILIRUB UR QL STRIP.AUTO: NEGATIVE
BUN SERPL-MCNC: 27 MG/DL (ref 8–22)
CALCIUM ALBUM COR SERPL-MCNC: 9.3 MG/DL (ref 8.5–10.5)
CALCIUM SERPL-MCNC: 9.5 MG/DL (ref 8.5–10.5)
CASTS URNS QL MICRO: ABNORMAL /LPF (ref 0–2)
CASTS URNS QL MICRO: ABNORMAL /LPF (ref 0–2)
CHLORIDE SERPL-SCNC: 108 MMOL/L (ref 96–112)
CO2 SERPL-SCNC: 20 MMOL/L (ref 20–33)
COLOR UR: YELLOW
COLOR UR: YELLOW
CREAT SERPL-MCNC: 1.48 MG/DL (ref 0.5–1.4)
EKG IMPRESSION: NORMAL
EOSINOPHIL # BLD AUTO: 0.09 K/UL (ref 0–0.51)
EOSINOPHIL NFR BLD: 1.7 % (ref 0–6.9)
EPITHELIAL CELLS 1715: >20 /HPF (ref 0–5)
EPITHELIAL CELLS 1715: ABNORMAL /HPF (ref 0–5)
ERYTHROCYTE [DISTWIDTH] IN BLOOD BY AUTOMATED COUNT: 45.7 FL (ref 35.9–50)
EST. AVERAGE GLUCOSE BLD GHB EST-MCNC: 108 MG/DL
GFR SERPLBLD CREATININE-BSD FMLA CKD-EPI: 35 ML/MIN/1.73 M 2
GLOBULIN SER CALC-MCNC: 3 G/DL (ref 1.9–3.5)
GLUCOSE SERPL-MCNC: 87 MG/DL (ref 65–99)
GLUCOSE UR STRIP.AUTO-MCNC: NEGATIVE MG/DL
GLUCOSE UR STRIP.AUTO-MCNC: NEGATIVE MG/DL
HBA1C MFR BLD: 5.4 % (ref 4–5.6)
HCT VFR BLD AUTO: 43.9 % (ref 37–47)
HGB BLD-MCNC: 14.9 G/DL (ref 12–16)
IMM GRANULOCYTES # BLD AUTO: 0.01 K/UL (ref 0–0.11)
IMM GRANULOCYTES NFR BLD AUTO: 0.2 % (ref 0–0.9)
INR PPP: 0.97 (ref 0.87–1.13)
KETONES UR STRIP.AUTO-MCNC: NEGATIVE MG/DL
KETONES UR STRIP.AUTO-MCNC: NEGATIVE MG/DL
LEUKOCYTE ESTERASE UR QL STRIP.AUTO: ABNORMAL
LEUKOCYTE ESTERASE UR QL STRIP.AUTO: ABNORMAL
LYMPHOCYTES # BLD AUTO: 1.94 K/UL (ref 1–4.8)
LYMPHOCYTES NFR BLD: 36.7 % (ref 22–41)
MCH RBC QN AUTO: 31.8 PG (ref 27–33)
MCHC RBC AUTO-ENTMCNC: 33.9 G/DL (ref 32.2–35.5)
MCV RBC AUTO: 93.8 FL (ref 81.4–97.8)
MICRO URNS: ABNORMAL
MICRO URNS: ABNORMAL
MONOCYTES # BLD AUTO: 0.37 K/UL (ref 0–0.85)
MONOCYTES NFR BLD AUTO: 7 % (ref 0–13.4)
NEUTROPHILS # BLD AUTO: 2.84 K/UL (ref 1.82–7.42)
NEUTROPHILS NFR BLD: 53.6 % (ref 44–72)
NITRITE UR QL STRIP.AUTO: NEGATIVE
NITRITE UR QL STRIP.AUTO: NEGATIVE
NRBC # BLD AUTO: 0 K/UL
NRBC BLD-RTO: 0 /100 WBC (ref 0–0.2)
NT-PROBNP SERPL IA-MCNC: 340 PG/ML (ref 0–125)
PH UR STRIP.AUTO: 5.5 [PH] (ref 5–8)
PH UR STRIP.AUTO: 5.5 [PH] (ref 5–8)
PLATELET # BLD AUTO: 166 K/UL (ref 164–446)
PMV BLD AUTO: 10.7 FL (ref 9–12.9)
POTASSIUM SERPL-SCNC: 3.9 MMOL/L (ref 3.6–5.5)
PROT SERPL-MCNC: 7.3 G/DL (ref 6–8.2)
PROT UR QL STRIP: 30 MG/DL
PROT UR QL STRIP: 30 MG/DL
PROTHROMBIN TIME: 12.9 SEC (ref 12–14.6)
RBC # BLD AUTO: 4.68 M/UL (ref 4.2–5.4)
RBC # URNS HPF: ABNORMAL /HPF (ref 0–2)
RBC # URNS HPF: ABNORMAL /HPF (ref 0–2)
RBC UR QL AUTO: ABNORMAL
RBC UR QL AUTO: ABNORMAL
SODIUM SERPL-SCNC: 141 MMOL/L (ref 135–145)
SP GR UR STRIP.AUTO: 1.02
SP GR UR STRIP.AUTO: >1.045
TROPONIN T SERPL-MCNC: 21 NG/L (ref 6–19)
TROPONIN T SERPL-MCNC: 22 NG/L (ref 6–19)
UROBILINOGEN UR STRIP.AUTO-MCNC: 1 EU/DL
UROBILINOGEN UR STRIP.AUTO-MCNC: 1 EU/DL
WBC # BLD AUTO: 5.3 K/UL (ref 4.8–10.8)
WBC #/AREA URNS HPF: ABNORMAL /HPF
WBC #/AREA URNS HPF: ABNORMAL /HPF

## 2025-03-15 PROCEDURE — 80053 COMPREHEN METABOLIC PANEL: CPT

## 2025-03-15 PROCEDURE — 96375 TX/PRO/DX INJ NEW DRUG ADDON: CPT

## 2025-03-15 PROCEDURE — 85730 THROMBOPLASTIN TIME PARTIAL: CPT

## 2025-03-15 PROCEDURE — 99285 EMERGENCY DEPT VISIT HI MDM: CPT

## 2025-03-15 PROCEDURE — 70496 CT ANGIOGRAPHY HEAD: CPT

## 2025-03-15 PROCEDURE — 70498 CT ANGIOGRAPHY NECK: CPT

## 2025-03-15 PROCEDURE — A9270 NON-COVERED ITEM OR SERVICE: HCPCS | Performed by: INTERNAL MEDICINE

## 2025-03-15 PROCEDURE — 85610 PROTHROMBIN TIME: CPT

## 2025-03-15 PROCEDURE — 81001 URINALYSIS AUTO W/SCOPE: CPT

## 2025-03-15 PROCEDURE — 93005 ELECTROCARDIOGRAM TRACING: CPT | Mod: TC

## 2025-03-15 PROCEDURE — 700102 HCHG RX REV CODE 250 W/ 637 OVERRIDE(OP): Performed by: INTERNAL MEDICINE

## 2025-03-15 PROCEDURE — G0378 HOSPITAL OBSERVATION PER HR: HCPCS

## 2025-03-15 PROCEDURE — 99223 1ST HOSP IP/OBS HIGH 75: CPT | Mod: 25,AI | Performed by: INTERNAL MEDICINE

## 2025-03-15 PROCEDURE — 83036 HEMOGLOBIN GLYCOSYLATED A1C: CPT

## 2025-03-15 PROCEDURE — 96372 THER/PROPH/DIAG INJ SC/IM: CPT

## 2025-03-15 PROCEDURE — 700117 HCHG RX CONTRAST REV CODE 255: Performed by: EMERGENCY MEDICINE

## 2025-03-15 PROCEDURE — 96374 THER/PROPH/DIAG INJ IV PUSH: CPT

## 2025-03-15 PROCEDURE — 94760 N-INVAS EAR/PLS OXIMETRY 1: CPT

## 2025-03-15 PROCEDURE — 85025 COMPLETE CBC W/AUTO DIFF WBC: CPT

## 2025-03-15 PROCEDURE — 700111 HCHG RX REV CODE 636 W/ 250 OVERRIDE (IP): Mod: JZ | Performed by: EMERGENCY MEDICINE

## 2025-03-15 PROCEDURE — 84484 ASSAY OF TROPONIN QUANT: CPT

## 2025-03-15 PROCEDURE — 0042T CT-CEREBRAL PERFUSION ANALYSIS: CPT

## 2025-03-15 PROCEDURE — 71045 X-RAY EXAM CHEST 1 VIEW: CPT

## 2025-03-15 PROCEDURE — 36415 COLL VENOUS BLD VENIPUNCTURE: CPT

## 2025-03-15 PROCEDURE — 83880 ASSAY OF NATRIURETIC PEPTIDE: CPT

## 2025-03-15 PROCEDURE — 93005 ELECTROCARDIOGRAM TRACING: CPT | Mod: TC | Performed by: EMERGENCY MEDICINE

## 2025-03-15 PROCEDURE — 700111 HCHG RX REV CODE 636 W/ 250 OVERRIDE (IP): Performed by: INTERNAL MEDICINE

## 2025-03-15 PROCEDURE — 99406 BEHAV CHNG SMOKING 3-10 MIN: CPT | Performed by: INTERNAL MEDICINE

## 2025-03-15 PROCEDURE — 99497 ADVNCD CARE PLAN 30 MIN: CPT | Mod: 25 | Performed by: INTERNAL MEDICINE

## 2025-03-15 RX ORDER — LABETALOL HYDROCHLORIDE 5 MG/ML
10 INJECTION, SOLUTION INTRAVENOUS ONCE
Status: COMPLETED | OUTPATIENT
Start: 2025-03-15 | End: 2025-03-15

## 2025-03-15 RX ORDER — AMLODIPINE BESYLATE 5 MG/1
5 TABLET ORAL
Status: DISCONTINUED | OUTPATIENT
Start: 2025-03-15 | End: 2025-03-20 | Stop reason: HOSPADM

## 2025-03-15 RX ORDER — LABETALOL HYDROCHLORIDE 5 MG/ML
20 INJECTION, SOLUTION INTRAVENOUS EVERY 4 HOURS PRN
Status: DISCONTINUED | OUTPATIENT
Start: 2025-03-15 | End: 2025-03-18

## 2025-03-15 RX ORDER — HYDRALAZINE HYDROCHLORIDE 20 MG/ML
20 INJECTION INTRAMUSCULAR; INTRAVENOUS ONCE
Status: COMPLETED | OUTPATIENT
Start: 2025-03-15 | End: 2025-03-15

## 2025-03-15 RX ORDER — HYDRALAZINE HYDROCHLORIDE 20 MG/ML
20 INJECTION INTRAMUSCULAR; INTRAVENOUS EVERY 6 HOURS PRN
Status: DISCONTINUED | OUTPATIENT
Start: 2025-03-15 | End: 2025-03-20 | Stop reason: HOSPADM

## 2025-03-15 RX ORDER — ASPIRIN 81 MG/1
81 TABLET, CHEWABLE ORAL DAILY
Status: DISCONTINUED | OUTPATIENT
Start: 2025-03-15 | End: 2025-03-20 | Stop reason: HOSPADM

## 2025-03-15 RX ORDER — IPRATROPIUM BROMIDE AND ALBUTEROL SULFATE 2.5; .5 MG/3ML; MG/3ML
3 SOLUTION RESPIRATORY (INHALATION)
Status: DISCONTINUED | OUTPATIENT
Start: 2025-03-15 | End: 2025-03-20 | Stop reason: HOSPADM

## 2025-03-15 RX ORDER — HEPARIN SODIUM 5000 [USP'U]/ML
5000 INJECTION, SOLUTION INTRAVENOUS; SUBCUTANEOUS EVERY 8 HOURS
Status: DISCONTINUED | OUTPATIENT
Start: 2025-03-15 | End: 2025-03-20 | Stop reason: HOSPADM

## 2025-03-15 RX ORDER — CARVEDILOL 6.25 MG/1
6.25 TABLET ORAL 2 TIMES DAILY WITH MEALS
Status: DISCONTINUED | OUTPATIENT
Start: 2025-03-15 | End: 2025-03-20 | Stop reason: HOSPADM

## 2025-03-15 RX ORDER — ASPIRIN 300 MG/1
300 SUPPOSITORY RECTAL DAILY
Status: DISCONTINUED | OUTPATIENT
Start: 2025-03-15 | End: 2025-03-20 | Stop reason: HOSPADM

## 2025-03-15 RX ORDER — ACETAMINOPHEN 325 MG/1
650 TABLET ORAL EVERY 6 HOURS PRN
Status: DISCONTINUED | OUTPATIENT
Start: 2025-03-15 | End: 2025-03-20 | Stop reason: HOSPADM

## 2025-03-15 RX ORDER — ATORVASTATIN CALCIUM 40 MG/1
40 TABLET, FILM COATED ORAL EVERY EVENING
Status: DISCONTINUED | OUTPATIENT
Start: 2025-03-15 | End: 2025-03-20 | Stop reason: HOSPADM

## 2025-03-15 RX ADMIN — ATORVASTATIN CALCIUM 40 MG: 40 TABLET, FILM COATED ORAL at 18:43

## 2025-03-15 RX ADMIN — IOHEXOL 40 ML: 350 INJECTION, SOLUTION INTRAVENOUS at 14:00

## 2025-03-15 RX ADMIN — LABETALOL HYDROCHLORIDE 10 MG: 5 INJECTION, SOLUTION INTRAVENOUS at 14:31

## 2025-03-15 RX ADMIN — IOHEXOL 80 ML: 350 INJECTION, SOLUTION INTRAVENOUS at 14:01

## 2025-03-15 RX ADMIN — HEPARIN SODIUM 5000 UNITS: 5000 INJECTION, SOLUTION INTRAVENOUS; SUBCUTANEOUS at 22:00

## 2025-03-15 RX ADMIN — AMLODIPINE BESYLATE 5 MG: 5 TABLET ORAL at 15:45

## 2025-03-15 RX ADMIN — CARVEDILOL 6.25 MG: 6.25 TABLET, FILM COATED ORAL at 18:43

## 2025-03-15 RX ADMIN — HEPARIN SODIUM 5000 UNITS: 5000 INJECTION, SOLUTION INTRAVENOUS; SUBCUTANEOUS at 15:46

## 2025-03-15 RX ADMIN — ASPIRIN 81 MG: 81 TABLET, CHEWABLE ORAL at 15:45

## 2025-03-15 RX ADMIN — HYDRALAZINE HYDROCHLORIDE 20 MG: 20 INJECTION, SOLUTION INTRAMUSCULAR; INTRAVENOUS at 15:29

## 2025-03-15 SDOH — ECONOMIC STABILITY: TRANSPORTATION INSECURITY
IN THE PAST 12 MONTHS, HAS LACK OF RELIABLE TRANSPORTATION KEPT YOU FROM MEDICAL APPOINTMENTS, MEETINGS, WORK OR FROM GETTING THINGS NEEDED FOR DAILY LIVING?: NO

## 2025-03-15 SDOH — ECONOMIC STABILITY: TRANSPORTATION INSECURITY
IN THE PAST 12 MONTHS, HAS THE LACK OF TRANSPORTATION KEPT YOU FROM MEDICAL APPOINTMENTS OR FROM GETTING MEDICATIONS?: NO

## 2025-03-15 ASSESSMENT — LIFESTYLE VARIABLES
TOTAL SCORE: 0
HAVE PEOPLE ANNOYED YOU BY CRITICIZING YOUR DRINKING: NO
TOTAL SCORE: 0
EVER HAD A DRINK FIRST THING IN THE MORNING TO STEADY YOUR NERVES TO GET RID OF A HANGOVER: NO
EVER FELT BAD OR GUILTY ABOUT YOUR DRINKING: NO
TOTAL SCORE: 0
DO YOU DRINK ALCOHOL: NO
HAVE YOU EVER FELT YOU SHOULD CUT DOWN ON YOUR DRINKING: NO
EVER HAD A DRINK FIRST THING IN THE MORNING TO STEADY YOUR NERVES TO GET RID OF A HANGOVER: NO
TOTAL SCORE: 0
CONSUMPTION TOTAL: NEGATIVE
HAVE PEOPLE ANNOYED YOU BY CRITICIZING YOUR DRINKING: NO
ALCOHOL_USE: NO
HAVE YOU EVER FELT YOU SHOULD CUT DOWN ON YOUR DRINKING: NO
TOTAL SCORE: 0
CONSUMPTION TOTAL: INCOMPLETE
EVER FELT BAD OR GUILTY ABOUT YOUR DRINKING: NO
TOTAL SCORE: 0
ON A TYPICAL DAY WHEN YOU DRINK ALCOHOL HOW MANY DRINKS DO YOU HAVE: 0
HOW MANY TIMES IN THE PAST YEAR HAVE YOU HAD 5 OR MORE DRINKS IN A DAY: 0
AVERAGE NUMBER OF DAYS PER WEEK YOU HAVE A DRINK CONTAINING ALCOHOL: 0

## 2025-03-15 ASSESSMENT — PATIENT HEALTH QUESTIONNAIRE - PHQ9
1. LITTLE INTEREST OR PLEASURE IN DOING THINGS: NOT AT ALL
SUM OF ALL RESPONSES TO PHQ9 QUESTIONS 1 AND 2: 0
2. FEELING DOWN, DEPRESSED, IRRITABLE, OR HOPELESS: NOT AT ALL

## 2025-03-15 ASSESSMENT — COGNITIVE AND FUNCTIONAL STATUS - GENERAL
MOBILITY SCORE: 20
STANDING UP FROM CHAIR USING ARMS: A LITTLE
DAILY ACTIVITIY SCORE: 19
MOVING TO AND FROM BED TO CHAIR: A LITTLE
PERSONAL GROOMING: A LITTLE
TOILETING: A LITTLE
SUGGESTED CMS G CODE MODIFIER MOBILITY: CJ
CLIMB 3 TO 5 STEPS WITH RAILING: A LITTLE
HELP NEEDED FOR BATHING: A LITTLE
SUGGESTED CMS G CODE MODIFIER DAILY ACTIVITY: CK
WALKING IN HOSPITAL ROOM: A LITTLE
DRESSING REGULAR UPPER BODY CLOTHING: A LITTLE
DRESSING REGULAR LOWER BODY CLOTHING: A LITTLE

## 2025-03-15 ASSESSMENT — ENCOUNTER SYMPTOMS
DIZZINESS: 0
CHILLS: 0
ORTHOPNEA: 0
FEVER: 0
NECK PAIN: 0
HEMOPTYSIS: 0
DOUBLE VISION: 0
CONSTIPATION: 0
BLURRED VISION: 0
VOMITING: 0
WEIGHT LOSS: 1
ABDOMINAL PAIN: 0
WEAKNESS: 1
SPEECH CHANGE: 0
FOCAL WEAKNESS: 1
CLAUDICATION: 0
COUGH: 0
MYALGIAS: 0
NAUSEA: 0
NERVOUS/ANXIOUS: 1
PALPITATIONS: 0
PHOTOPHOBIA: 0
DIARRHEA: 0

## 2025-03-15 ASSESSMENT — SOCIAL DETERMINANTS OF HEALTH (SDOH)
IN THE PAST 12 MONTHS, HAS THE ELECTRIC, GAS, OIL, OR WATER COMPANY THREATENED TO SHUT OFF SERVICE IN YOUR HOME?: NO
WITHIN THE LAST YEAR, HAVE TO BEEN RAPED OR FORCED TO HAVE ANY KIND OF SEXUAL ACTIVITY BY YOUR PARTNER OR EX-PARTNER?: NO
WITHIN THE LAST YEAR, HAVE YOU BEEN AFRAID OF YOUR PARTNER OR EX-PARTNER?: NO
WITHIN THE LAST YEAR, HAVE YOU BEEN HUMILIATED OR EMOTIONALLY ABUSED IN OTHER WAYS BY YOUR PARTNER OR EX-PARTNER?: NO
WITHIN THE PAST 12 MONTHS, THE FOOD YOU BOUGHT JUST DIDN'T LAST AND YOU DIDN'T HAVE MONEY TO GET MORE: NEVER TRUE
WITHIN THE LAST YEAR, HAVE YOU BEEN KICKED, HIT, SLAPPED, OR OTHERWISE PHYSICALLY HURT BY YOUR PARTNER OR EX-PARTNER?: NO
WITHIN THE PAST 12 MONTHS, YOU WORRIED THAT YOUR FOOD WOULD RUN OUT BEFORE YOU GOT THE MONEY TO BUY MORE: NEVER TRUE

## 2025-03-15 ASSESSMENT — PAIN DESCRIPTION - PAIN TYPE: TYPE: ACUTE PAIN

## 2025-03-15 ASSESSMENT — FIBROSIS 4 INDEX: FIB4 SCORE: 5

## 2025-03-15 NOTE — H&P
Hospital Medicine History & Physical Note    Date of Service  3/15/2025    Primary Care Physician  Nneka Marie M.D.    Consultants  None    Code Status  Full Code    Chief Complaint  Chief Complaint   Patient presents with    Extremity Weakness     R sided weakness began x2 days ago, LKW Wedn, states Rx thinner but has not been taking, NIH with aphasia with slow response, L sided facial numbness, R sided arm weakness     Chest Pain     Chest pain began yesterday, mid chest pain 9/10, tachypnea, home O2 as needed, hx smoking       History of Presenting Illness    79-year-old female with history of smoking and hypertension who presented 3/15 with right-sided weakness.  Patient lives with her son, patient is poor historian and not able to provide a good history, patient noticed weakness on the left arm and leg 4 days before the admission with a chest pain, pressure chest pain, does not radiate, no loss of consciousness or other symptoms.  On admission patient was found to have elevated blood pressure 190/94, according to her son patient's taking her medication regularly at home?  EKG did not show any ischemia and troponin 22 and 21 with , creatinine was 1.4 and no history of CKD?  Patient will be admitted to the hospital for hypertensive emergency and possible stroke.      I discussed the plan of care with patient, family, bedside RN, and ED physician .    Review of Systems  Review of Systems   Constitutional:  Positive for malaise/fatigue and weight loss. Negative for chills and fever.   HENT:  Negative for ear pain, hearing loss and tinnitus.    Eyes:  Negative for blurred vision, double vision and photophobia.   Respiratory:  Negative for cough and hemoptysis.    Cardiovascular:  Negative for chest pain, palpitations, orthopnea and claudication.   Gastrointestinal:  Negative for abdominal pain, constipation, diarrhea, nausea and vomiting.   Genitourinary:  Negative for dysuria, frequency and urgency.    Musculoskeletal:  Negative for myalgias and neck pain.   Skin:  Negative for rash.   Neurological:  Positive for focal weakness and weakness. Negative for dizziness and speech change.   Psychiatric/Behavioral:  The patient is nervous/anxious.        Past Medical History  Hypertension, questioning about compliance with medications    Surgical History  Reviewed    Family History  Family history reviewed with patient. There is family history that is pertinent to the chief complaint.     Social History   reports that she has been smoking cigarettes. She has never used smokeless tobacco. She reports that she does not currently use alcohol. She reports that she does not use drugs.    Allergies  Allergies   Allergen Reactions    Codeine Nausea     nausea    Penicillins Rash     Rash  > 60 years ago       Medications  None       Physical Exam  Temp:  [36.9 °C (98.5 °F)] 36.9 °C (98.5 °F)  Pulse:  [52-71] 65  Resp:  [14-52] 18  BP: (134-228)/(63-94) 139/63  SpO2:  [91 %-98 %] 98 %  Blood Pressure : (!) 200/79   Temperature: 36.9 °C (98.5 °F)   Pulse: (!) 54   Respiration: (!) 32   Pulse Oximetry: 88 %       Physical Exam  Constitutional:       General: She is not in acute distress.     Appearance: She is ill-appearing.      Comments: Significant acute kidney appearance and muscles loss   Cardiovascular:      Rate and Rhythm: Normal rate.      Heart sounds: No murmur heard.  Pulmonary:      Effort: No respiratory distress.      Breath sounds: No stridor. No wheezing or rales.   Abdominal:      General: There is no distension.      Palpations: Abdomen is soft.      Tenderness: There is no abdominal tenderness.   Musculoskeletal:         General: No deformity.      Right lower leg: No edema.      Left lower leg: No edema.   Skin:     Findings: No bruising, lesion or rash.   Neurological:      General: No focal deficit present.      Mental Status: She is oriented to person, place, and time. Mental status is at baseline.       Cranial Nerves: No cranial nerve deficit.      Sensory: No sensory deficit.      Motor: No weakness.         Laboratory:  Recent Labs     03/15/25  1044   WBC 5.3   RBC 4.68   HEMOGLOBIN 14.9   HEMATOCRIT 43.9   MCV 93.8   MCH 31.8   MCHC 33.9   RDW 45.7   PLATELETCT 166   MPV 10.7     Recent Labs     03/15/25  1044   SODIUM 141   POTASSIUM 3.9   CHLORIDE 108   CO2 20   GLUCOSE 87   BUN 27*   CREATININE 1.48*   CALCIUM 9.5     Recent Labs     03/15/25  1044   ALTSGPT <5   ASTSGOT 22   ALKPHOSPHAT 60   TBILIRUBIN 0.7   GLUCOSE 87     Recent Labs     03/15/25  1044   APTT 29.5   INR 0.97     Recent Labs     03/15/25  1044   NTPROBNP 340*         Recent Labs     03/15/25  1044 03/15/25  1345   TROPONINT 22* 21*       Imaging:  CT-CTA NECK WITH & W/O-POST PROCESSING   Final Result      CT angiogram of the neck within normal limits.      CT-CTA HEAD WITH & W/O-POST PROCESS   Final Result      CT angiogram of the [place] within normal limits.      CT-CEREBRAL PERFUSION ANALYSIS   Final Result      1. Cerebral blood flow less than 30% possibly representing completed infarct = 0 mL. Based on distribution of this finding, this is unlikely to represent artifact.      2. T Max more than 6 seconds possibly representing combination of completed infarct and ischemia = 0 mL. Based on the distribution of this finding, this is unlikely to represent artifact.      3. Mismatched volume possibly representing ischemic brain/penumbra= 0 mL      4.  Please note that this cerebral perfusion study and report is Quantitative and targets supratentorial (cerebral) perfusion for evaluation of large vessel territory acute ischemia/infarction. For example, lacunar infarcts, and brainstem/posterior fossa    ischemia/infarction are not evaluated on this study.  Data acquisition is subject to artifacts which can yield non-anatomically plausible perfusion maps which may be due to motion, bolus timing, signal to noise ratio, or other  "technical factors.    Perfusion map abnormalities which show non-anatomic distributions are likely artifact.   This study is not \"stand-alone\" and should only be utilized for diagnosis, management/treatment in correlation with CT, CTA, and/or MRI and clinical factors.         DX-CHEST-PORTABLE (1 VIEW)   Final Result         1. Mild coarse interstitial lung markings but no confluent infiltrate or effusion.                     EC-ECHOCARDIOGRAM COMPLETE W/O CONT    (Results Pending)   MR-BRAIN-W/O    (Results Pending)   NM-CARDIAC STRESS TEST    (Results Pending)       X-Ray:  I have personally reviewed the images and compared with prior images.  EKG:  I have personally reviewed the images and compared with prior images.    Assessment/Plan:  Justification for Admission Status  I anticipate this patient is appropriate for observation status at this time because possible stroke    Patient will need a Telemetry bed on CARDIOLOGY service .  The need is secondary to possible stroke/chest pain.    * Hypertensive emergency- (present on admission)  Assessment & Plan  Came with the blood pressure more than 200/100 and chest pain with signs of stroke  CTA for head and neck did not show any stenosis  MRI is pending  Echo  Start with amlodipine and carvedilol  Labetalol IV as needed    Right sided weakness  Assessment & Plan  Patient is on high risk for stroke  Mild weakness and discoordination on the right side  CT scan did not show any acute finding and no bleeding also CTA with no stenosis for neck and head  MRI is pending  Aspirin and atorvastatin  Telemetry    ERROL (acute kidney injury) (HCC)  Assessment & Plan  Creatinine 1.4, unknown her baseline  Order ultrasound  Likely related to uncontrolled hypertension  Check A1c  Amlodipine and carvedilol    Chest pain  Assessment & Plan  The ASCVD Risk score (Monalisa ARAUJO, et al., 2019) failed to calculate for the following reasons:    The 2019 ASCVD risk score is only valid for ages " 40 to 79  Patient is on high risk for coronary artery disease  Her chest pain possibly related to hypertensive emergency  EKG did not show ischemia and troponin around 21  Continue telemetry and repeat troponin  Echo and stress test  Aspirin and atorvastatin  Encouraged the patient to quit smoking    ACP (advance care planning)  Assessment & Plan  Plan of care was discussed in detail with the patient and son at bedside, answered all their questions, discussed the CODE STATUS, patient wants to be full code at this time and all treatment.  Time 25 minutes    Malnutrition (HCC)  Assessment & Plan  With history of smoking and weight loss  Patient is on high risk for malignancy  Needs malignancy screening as outpatient    Smoking  Assessment & Plan  I spent 5 minutes counseling the patient on cessation techniques and resources were offered including nicotine patches, gum, along with medical treatment with wellbutrin and chantix. The patient understands continuing to smoke could lead to complications such as lung disease, heart attack, stroke and death.  The benefits of stopping were also presented to him. The patient verbalized understanding. CPT CODE: 51219 (3-10 minutes tobacco counseling).      Patient is cancer screening with CT scan for chest        VTE prophylaxis: SCDs/TEDs and enoxaparin ppx

## 2025-03-15 NOTE — ED PROVIDER NOTES
ED PHYSICIAN NOTE    CHIEF COMPLAINT  Chief Complaint   Patient presents with    Extremity Weakness     R sided weakness began x2 days ago, LKW Wedn, states Rx thinner but has not been taking, NIH with aphasia with slow response, L sided facial numbness, R sided arm weakness     Chest Pain     Chest pain began yesterday, mid chest pain 9/10, tachypnea, home O2 as needed, hx smoking       EXTERNAL RECORDS REVIEWED  Inpatient Notes Last hospitalization 6/3/2020. Patient with past medical history of COPD, hypertension, hyperlipidemia who was admitted for left big toe gangrene and cellulitis.     HPI/ROS  LIMITATION TO HISTORY   Select: Poor historian   OUTSIDE HISTORIAN(S):  Family : Son    Coty Valdivia is a 79 y.o. female with past medical history of tobacco use, COPD, hyperlipidemia, and hypertension who presents with complaint of right lower extremity weakness and chest pain. Patient is accompanied by her son who is patient's care giver. He states patient does have chronic right lower extremity weakness but in the past three days has significantly worsened. Uses a cane or walker but has been difficult to ambulate. He reports patient is typically able to perform daily activities but has stayed in her room the past several days. He states patient is supposed to be on a blood thinner but unsure why as well as supplemental oxygen. Patient at bedside reports she has reported some chest pain.  Denies chest pain upon questioning    PAST MEDICAL HISTORY  COPD, hypertension, hyperlipidemia    SOCIAL HISTORY  Social History     Tobacco Use    Smoking status: Every Day     Types: Cigarettes    Smokeless tobacco: Never   Vaping Use    Vaping status: Never Used   Substance Use Topics    Alcohol use: Not Currently    Drug use: Never       CURRENT MEDICATIONS  Home Medications       Reviewed by Parvin Sellers R.N. (Registered Nurse) on 03/15/25 at 0948  Med List Status: Partial     Medication Last Dose Status        Patient  "Dhaval Taking any Medications                         Audit from Redirected Encounters    **Home medications have not yet been reviewed for this encounter**         ALLERGIES  Allergies   Allergen Reactions    Codeine Nausea     nausea    Penicillins Rash     rash       PHYSICAL EXAM  VITAL SIGNS: BP (!) 159/74   Pulse 71   Temp 36.9 °C (98.5 °F) (Temporal)   Resp (!) 24   Ht 1.6 m (5' 3\")   Wt 42.6 kg (94 lb)   SpO2 91%   BMI 16.65 kg/m²    Constitutional: Awake, elderly chronically ill appearing   HENT: Normal inspection  Eyes: Normal inspection  Neck: Grossly normal range of motion.  Cardiovascular: Normal heart rate, Normal rhythm.  Symmetric peripheral pulses.   Thorax & Lungs: No respiratory distress, No wheezing, No rales, No rhonchi, No chest tenderness.   Abdomen: Bowel sounds normal, soft, non-distended, nontender, no mass  Skin: No rash.  Extremities: No asymmetric swelling  Neurologic:      NIHSS    1a. Level of Consciousness  Alert. Keenly Responsive  Not Alert but arousable by minor stimulation  Not Alert. Requires repeated stimulation  Responds only w reflex motor/autonomic effects or totally unresponsive  Score:0  1b. Level of Consciousness: ask month and age  Answers both questions correctly  Answers one question correctly  Answers neither question correctly  Score: 0  1c. Level of Consciousness: ask to open and close eyes/make a fist with non-paretic hand  Performs both tasks correctly  Performs one task correctly  Performs neither task correctly  Score: 0  2. Best gaze: Horizontal eye movement             0 Normal             1 Partial gaze palsy. Forced deviation or total palsy not present             2 Forced deviation. Not overcome by oculo-cephalic maneuver            Score: 0  3. Visual Fields             0 No loss             1 Partial hemianopia             2 Complete hemianopia             3 Bilateral hemianopia. Blindness             Score:0  4. Facial Palsy           0 Normal " symmetrical movements           1 Minor paralysis           2 Partial paralysis           3 Complete paralysis           Score: 1  5. Motor Arm Right and Left           0 No drift           1 Drift but does not hit bed           2 Some effort against gravity. Hits bed           3 No effort against gravity           4 No movement          UN Amputation/joint fusion/explain          Score R: 0          Score L: 0  6. Motor Leg: Right and Left          0 No drift          1 Drift but does not hit bed          2 Some effort against gravity. Hits bed          3 No effort against gravity          4 No movement          UN Amputation/joint fusion/explain          Score R :2          Score L :0  7. Limb Ataxia. Finger to nose/ shin-heel         0 Absent         1 Present in one limb         2 Present in two limbs         UN Amputation/joint fusion explain         Score:0  8. Sensory. Pinprick         0 Normal. No sensory loss         1 Mild to moderate sensory loss         2 Severe or total sensory loss         Score: 0  9. Best language. Show the kitchen/cookie picture         0 No aphasia         1 Mild to moderate aphasia         2 Severe aphasia         3 Mute. Global aphasia         Score:0  10. Dysarthria. Show the reading list         0 Normal         1 Mild to moderate dysarthria         2 Severe dysarthria         3 Intubated or other physical barrier         Score: 0  11. Extinction and Inattention         0 No abnormality         1 Extinction to simultaneous stimulation         2 Profound Johnathan-inattention or extinction         Score: 0        Total score: 3     DIAGNOSTIC STUDIES / PROCEDURES  LABS/EKG  Results for orders placed or performed during the hospital encounter of 03/15/25   EKG    Collection Time: 03/15/25 10:19 AM   Result Value Ref Range    Report       [facility] Emergency Dept.    Test Date:  2025-03-15  Pt Name:    DIANE HUMPHREY             Department: ER  MRN:         [id number]                      Room:  Gender:     Female                       Technician: [id number]  :        [id number]                   Requested By:ER TRIAGE PROTOCOL  Order #:    [id number]                    Reading MD: TOMMY ARNOLD MD    Measurements  Intervals                                Axis  Rate:       63                           P:          77  SC:         153                          QRS:        56  QRSD:       86                           T:          68  QT:         449  QTc:        460    Interpretive Statements  Sinus rhythm  Probable left atrial enlargement  Anteroseptal infarct, age indeterminate  No previous ECG available for comparison  Electronically Signed On 03- 10:19:47 PDT by TOMMY ARNOLD MD     CBC with Differential    Collection Time: 03/15/25 10:44 AM   Result Value Ref Range    WBC 5.3 4.8 - 10.8 K/uL    RBC 4.68 4.20 - 5.40 M/uL    Hemoglobin 14.9 12.0 - 16.0 g/dL    Hematocrit 43.9 37.0 - 47.0 %    MCV 93.8 81.4 - 97.8 fL    MCH 31.8 27.0 - 33.0 pg    MCHC 33.9 32.2 - 35.5 g/dL    RDW 45.7 35.9 - 50.0 fL    Platelet Count 166 164 - 446 K/uL    MPV 10.7 9.0 - 12.9 fL    Neutrophils-Polys 53.60 44.00 - 72.00 %    Lymphocytes 36.70 22.00 - 41.00 %    Monocytes 7.00 0.00 - 13.40 %    Eosinophils 1.70 0.00 - 6.90 %    Basophils 0.80 0.00 - 1.80 %    Immature Granulocytes 0.20 0.00 - 0.90 %    Nucleated RBC 0.00 0.00 - 0.20 /100 WBC    Neutrophils (Absolute) 2.84 1.82 - 7.42 K/uL    Lymphs (Absolute) 1.94 1.00 - 4.80 K/uL    Monos (Absolute) 0.37 0.00 - 0.85 K/uL    Eos (Absolute) 0.09 0.00 - 0.51 K/uL    Baso (Absolute) 0.04 0.00 - 0.12 K/uL    Immature Granulocytes (abs) 0.01 0.00 - 0.11 K/uL    NRBC (Absolute) 0.00 K/uL   Complete Metabolic Panel (CMP)    Collection Time: 03/15/25 10:44 AM   Result Value Ref Range    Sodium 141 135 - 145 mmol/L    Potassium 3.9 3.6 - 5.5 mmol/L    Chloride 108 96 - 112 mmol/L    Co2 20 20 - 33 mmol/L    Anion Gap 13.0 7.0 - 16.0    Glucose  87 65 - 99 mg/dL    Bun 27 (H) 8 - 22 mg/dL    Creatinine 1.48 (H) 0.50 - 1.40 mg/dL    Calcium 9.5 8.5 - 10.5 mg/dL    Correct Calcium 9.3 8.5 - 10.5 mg/dL    AST(SGOT) 22 12 - 45 U/L    ALT(SGPT) <5 2 - 50 U/L    Alkaline Phosphatase 60 30 - 99 U/L    Total Bilirubin 0.7 0.1 - 1.5 mg/dL    Albumin 4.3 3.2 - 4.9 g/dL    Total Protein 7.3 6.0 - 8.2 g/dL    Globulin 3.0 1.9 - 3.5 g/dL    A-G Ratio 1.4 g/dL   proBrain Natriuretic Peptide, NT (BNP)    Collection Time: 03/15/25 10:44 AM   Result Value Ref Range    NT-proBNP 340 (H) 0 - 125 pg/mL   Troponins NOW    Collection Time: 03/15/25 10:44 AM   Result Value Ref Range    Troponin T 22 (H) 6 - 19 ng/L   PROTHROMBIN TIME    Collection Time: 03/15/25 10:44 AM   Result Value Ref Range    PT 12.9 12.0 - 14.6 sec    INR 0.97 0.87 - 1.13   APTT    Collection Time: 03/15/25 10:44 AM   Result Value Ref Range    APTT 29.5 24.7 - 36.0 sec   ESTIMATED GFR    Collection Time: 03/15/25 10:44 AM   Result Value Ref Range    GFR (CKD-EPI) 35 (A) >60 mL/min/1.73 m 2   URINALYSIS CULTURE, IF INDICATED    Collection Time: 03/15/25  1:16 PM    Specimen: Urine, Clean Catch   Result Value Ref Range    Color Yellow     Character Cloudy (A)     Specific Gravity 1.016 <1.035    Ph 5.5 5.0 - 8.0    Glucose Negative Negative mg/dL    Ketones Negative Negative mg/dL    Protein 30 (A) Negative mg/dL    Bilirubin Negative Negative    Urobilinogen, Urine 1.0 <=1.0 EU/dL    Nitrite Negative Negative    Leukocyte Esterase Small (A) Negative    Occult Blood Small (A) Negative    Micro Urine Req Microscopic    URINE MICROSCOPIC (W/UA)    Collection Time: 03/15/25  1:16 PM   Result Value Ref Range    WBC 11-20 (A) /hpf    RBC 11-20 (A) 0 - 2 /hpf    Bacteria None Seen None /hpf    Epithelial Cells >20 (A) 0 - 5 /hpf    Urine Casts 3-5 (A) 0 - 2 /lpf   Troponins in two (2) hours    Collection Time: 03/15/25  1:45 PM   Result Value Ref Range    Troponin T 21 (H) 6 - 19 ng/L      I have independently  "interpreted this EKG as documented above      RADIOLOGY  CT-CTA NECK WITH & W/O-POST PROCESSING   Final Result      CT angiogram of the neck within normal limits.      CT-CTA HEAD WITH & W/O-POST PROCESS   Final Result      CT angiogram of the [place] within normal limits.      CT-CEREBRAL PERFUSION ANALYSIS   Final Result      1. Cerebral blood flow less than 30% possibly representing completed infarct = 0 mL. Based on distribution of this finding, this is unlikely to represent artifact.      2. T Max more than 6 seconds possibly representing combination of completed infarct and ischemia = 0 mL. Based on the distribution of this finding, this is unlikely to represent artifact.      3. Mismatched volume possibly representing ischemic brain/penumbra= 0 mL      4.  Please note that this cerebral perfusion study and report is Quantitative and targets supratentorial (cerebral) perfusion for evaluation of large vessel territory acute ischemia/infarction. For example, lacunar infarcts, and brainstem/posterior fossa    ischemia/infarction are not evaluated on this study.  Data acquisition is subject to artifacts which can yield non-anatomically plausible perfusion maps which may be due to motion, bolus timing, signal to noise ratio, or other technical factors.    Perfusion map abnormalities which show non-anatomic distributions are likely artifact.   This study is not \"stand-alone\" and should only be utilized for diagnosis, management/treatment in correlation with CT, CTA, and/or MRI and clinical factors.         DX-CHEST-PORTABLE (1 VIEW)   Final Result         1. Mild coarse interstitial lung markings but no confluent infiltrate or effusion.                     EC-ECHOCARDIOGRAM COMPLETE W/O CONT    (Results Pending)   MR-BRAIN-W/O    (Results Pending)         COURSE & MEDICAL DECISION MAKING    INITIAL ASSESSMENT, COURSE AND PLAN  Case narrative: Patient presents with right lower extremity weakness and facial " numbness that started 3 days ago. NIH score of 3. Stroke up was initiated, CTA head, neck and cerebral perfusion study ordered.  Patient complained of chest pain at 1 point but now denies chest pain on repeat assessments.  Her EKG does not show ischemia.  No tearing pain or radiation of the back to suggest dissection.    Laboratory data with minimally elevated troponin.  This will need to be trended.  Otherwise unremarkable.    Patient remained hypertensive was given labetalol 10 mg IV.  Will allow some degree of permissive hypertension given consideration for CVA.    CT imaging returned negative.    Patient still f significantly hypertensive.  Ordered hydralazine.    Patient will be admitted to hospitalist service.  Discussed case with Dr. DIETRICH.          DISPOSITION AND DISCUSSIONS  I have discussed management of the patient with the following physicians and TUNDE's:  as noted above      FINAL IMPRESSION  1.  Hypertensive emergency  2.  Weakness, rule out CVA    CRITICAL CARE  The very real possibilty of a deterioration of this patient's condition required the highest level of my preparedness for sudden, emergent intervention.  I provided critical care services, which included medication orders, frequent reevaluations of the patient's condition and response to treatment, ordering and reviewing test results, and discussing the case with various consultants.  The critical care time associated with the care of the patient was 30 minutes. Review chart for interventions. This time is exclusive of any other billable procedures.       This dictation was created using voice recognition software. The accuracy of the dictation is limited to the abilities of the software. I expect there may be some errors of grammar and possibly content. The nursing notes were reviewed and certain aspects of this information were incorporated into this note.    Electronically signed by: Jose Juan Siegel M.D., 3/15/2025

## 2025-03-15 NOTE — ED NOTES
Patient assisted to restroom and back via wheelchair. She was able to provide sample that was collected and sent to lab

## 2025-03-15 NOTE — ED TRIAGE NOTES
"Chief Complaint   Patient presents with    Extremity Weakness     R sided weakness began x2 days ago, LKW Wedn, states Rx thinner but has not been taking, NIH with aphasia with slow response, L sided facial numbness, R sided arm weakness     Chest Pain     Chest pain began yesterday, mid chest pain 9/10, tachypnea, home O2 as needed, hx smoking      wc to triage with family for above complaint.    History reviewed. No pertinent past medical history.    Chest pain protocols ordered, NIH completed with score 3, charge updated. EKG completed. Pt brought to [facility] office for blood draw. Pt educated of triage process and possible wait times. Pt informed to contact staff if status changes or with any developing concerns, [facility]     BP (!) 159/74   Pulse 71   Temp 36.9 °C (98.5 °F) (Temporal)   Resp (!) 24   Ht 1.6 m (5' 3\")   Wt 42.6 kg (94 lb)   SpO2 91%   BMI 16.65 kg/m²    "

## 2025-03-15 NOTE — ED NOTES
Med rec updated and complete. Allergies reviewed and updated.     Pt stated her name and date of birth.      Pt stated that she uses [facility] on [facility].    Placed call to [facility] and pt last filled Alendronate 70 mg ( 4 tablet) 12/2024. No other medications noted on file.      Preferred Pharmacy  [facility] = [phone]

## 2025-03-16 ENCOUNTER — APPOINTMENT (OUTPATIENT)
Dept: RADIOLOGY | Facility: MEDICAL CENTER | Age: 80
DRG: 064 | End: 2025-03-16
Attending: INTERNAL MEDICINE
Payer: MEDICARE

## 2025-03-16 ENCOUNTER — APPOINTMENT (OUTPATIENT)
Dept: CARDIOLOGY | Facility: MEDICAL CENTER | Age: 80
DRG: 064 | End: 2025-03-16
Attending: INTERNAL MEDICINE
Payer: MEDICARE

## 2025-03-16 LAB
ANION GAP SERPL CALC-SCNC: 13 MMOL/L (ref 7–16)
BUN SERPL-MCNC: 26 MG/DL (ref 8–22)
CALCIUM SERPL-MCNC: 9.2 MG/DL (ref 8.5–10.5)
CHLORIDE SERPL-SCNC: 106 MMOL/L (ref 96–112)
CHOLEST SERPL-MCNC: 227 MG/DL (ref 100–199)
CO2 SERPL-SCNC: 20 MMOL/L (ref 20–33)
CREAT SERPL-MCNC: 1.43 MG/DL (ref 0.5–1.4)
ERYTHROCYTE [DISTWIDTH] IN BLOOD BY AUTOMATED COUNT: 45.8 FL (ref 35.9–50)
GFR SERPLBLD CREATININE-BSD FMLA CKD-EPI: 37 ML/MIN/1.73 M 2
GLUCOSE SERPL-MCNC: 102 MG/DL (ref 65–99)
HCT VFR BLD AUTO: 41.4 % (ref 37–47)
HDLC SERPL-MCNC: 63 MG/DL
HGB BLD-MCNC: 14.5 G/DL (ref 12–16)
LDLC SERPL CALC-MCNC: 128 MG/DL
MCH RBC QN AUTO: 32.4 PG (ref 27–33)
MCHC RBC AUTO-ENTMCNC: 35 G/DL (ref 32.2–35.5)
MCV RBC AUTO: 92.6 FL (ref 81.4–97.8)
PLATELET # BLD AUTO: 161 K/UL (ref 164–446)
PMV BLD AUTO: 10.7 FL (ref 9–12.9)
POTASSIUM SERPL-SCNC: 3.9 MMOL/L (ref 3.6–5.5)
RBC # BLD AUTO: 4.47 M/UL (ref 4.2–5.4)
SODIUM SERPL-SCNC: 139 MMOL/L (ref 135–145)
TRIGL SERPL-MCNC: 178 MG/DL (ref 0–149)
TROPONIN T SERPL-MCNC: 25 NG/L (ref 6–19)
WBC # BLD AUTO: 7 K/UL (ref 4.8–10.8)

## 2025-03-16 PROCEDURE — 700111 HCHG RX REV CODE 636 W/ 250 OVERRIDE (IP): Performed by: INTERNAL MEDICINE

## 2025-03-16 PROCEDURE — 700102 HCHG RX REV CODE 250 W/ 637 OVERRIDE(OP): Performed by: INTERNAL MEDICINE

## 2025-03-16 PROCEDURE — 80061 LIPID PANEL: CPT

## 2025-03-16 PROCEDURE — 84484 ASSAY OF TROPONIN QUANT: CPT

## 2025-03-16 PROCEDURE — A9502 TC99M TETROFOSMIN: HCPCS

## 2025-03-16 PROCEDURE — A9270 NON-COVERED ITEM OR SERVICE: HCPCS | Performed by: NURSE PRACTITIONER

## 2025-03-16 PROCEDURE — G0378 HOSPITAL OBSERVATION PER HR: HCPCS

## 2025-03-16 PROCEDURE — 97166 OT EVAL MOD COMPLEX 45 MIN: CPT

## 2025-03-16 PROCEDURE — 70551 MRI BRAIN STEM W/O DYE: CPT

## 2025-03-16 PROCEDURE — 36415 COLL VENOUS BLD VENIPUNCTURE: CPT

## 2025-03-16 PROCEDURE — 93356 MYOCRD STRAIN IMG SPCKL TRCK: CPT

## 2025-03-16 PROCEDURE — 85027 COMPLETE CBC AUTOMATED: CPT

## 2025-03-16 PROCEDURE — 96372 THER/PROPH/DIAG INJ SC/IM: CPT

## 2025-03-16 PROCEDURE — 76775 US EXAM ABDO BACK WALL LIM: CPT

## 2025-03-16 PROCEDURE — 93306 TTE W/DOPPLER COMPLETE: CPT | Mod: 26 | Performed by: INTERNAL MEDICINE

## 2025-03-16 PROCEDURE — A9270 NON-COVERED ITEM OR SERVICE: HCPCS | Performed by: INTERNAL MEDICINE

## 2025-03-16 PROCEDURE — 700102 HCHG RX REV CODE 250 W/ 637 OVERRIDE(OP): Performed by: NURSE PRACTITIONER

## 2025-03-16 PROCEDURE — 99233 SBSQ HOSP IP/OBS HIGH 50: CPT | Performed by: INTERNAL MEDICINE

## 2025-03-16 PROCEDURE — 97163 PT EVAL HIGH COMPLEX 45 MIN: CPT

## 2025-03-16 PROCEDURE — 92610 EVALUATE SWALLOWING FUNCTION: CPT

## 2025-03-16 PROCEDURE — 80048 BASIC METABOLIC PNL TOTAL CA: CPT

## 2025-03-16 PROCEDURE — 700111 HCHG RX REV CODE 636 W/ 250 OVERRIDE (IP): Mod: JZ

## 2025-03-16 RX ORDER — SIMVASTATIN 20 MG
20 TABLET ORAL NIGHTLY
Status: ON HOLD | COMMUNITY

## 2025-03-16 RX ORDER — NICOTINE 21 MG/24HR
21 PATCH, TRANSDERMAL 24 HOURS TRANSDERMAL
Status: DISCONTINUED | OUTPATIENT
Start: 2025-03-16 | End: 2025-03-20 | Stop reason: HOSPADM

## 2025-03-16 RX ORDER — OMEPRAZOLE 20 MG/1
40 CAPSULE, DELAYED RELEASE ORAL DAILY
Status: ON HOLD | COMMUNITY

## 2025-03-16 RX ORDER — PAROXETINE 20 MG/1
40 TABLET, FILM COATED ORAL DAILY
Status: ON HOLD | COMMUNITY

## 2025-03-16 RX ORDER — FERROUS SULFATE 325(65) MG
325 TABLET ORAL DAILY
Status: ON HOLD | COMMUNITY

## 2025-03-16 RX ORDER — ALENDRONATE SODIUM 10 MG/1
10 TABLET ORAL
Status: ON HOLD | COMMUNITY

## 2025-03-16 RX ORDER — AMINOPHYLLINE 25 MG/ML
100 INJECTION, SOLUTION INTRAVENOUS
Status: COMPLETED | OUTPATIENT
Start: 2025-03-16 | End: 2025-03-16

## 2025-03-16 RX ORDER — CLINDAMYCIN HYDROCHLORIDE 150 MG/1
300 CAPSULE ORAL 3 TIMES DAILY
Status: ON HOLD | COMMUNITY
End: 2025-03-19

## 2025-03-16 RX ORDER — REGADENOSON 0.08 MG/ML
0.4 INJECTION, SOLUTION INTRAVENOUS ONCE
Status: COMPLETED | OUTPATIENT
Start: 2025-03-16 | End: 2025-03-16

## 2025-03-16 RX ORDER — DONEPEZIL HYDROCHLORIDE 5 MG/1
10 TABLET, FILM COATED ORAL NIGHTLY
Status: ON HOLD | COMMUNITY

## 2025-03-16 RX ORDER — REGADENOSON 0.08 MG/ML
INJECTION, SOLUTION INTRAVENOUS
Status: COMPLETED
Start: 2025-03-16 | End: 2025-03-16

## 2025-03-16 RX ORDER — CLOPIDOGREL BISULFATE 75 MG/1
75 TABLET ORAL DAILY
Status: ON HOLD | COMMUNITY
End: 2025-03-19

## 2025-03-16 RX ORDER — AMINOPHYLLINE 25 MG/ML
INJECTION, SOLUTION INTRAVENOUS
Status: COMPLETED
Start: 2025-03-16 | End: 2025-03-16

## 2025-03-16 RX ADMIN — HEPARIN SODIUM 5000 UNITS: 5000 INJECTION, SOLUTION INTRAVENOUS; SUBCUTANEOUS at 22:12

## 2025-03-16 RX ADMIN — NICOTINE TRANSDERMAL SYSTEM 21 MG: 21 PATCH, EXTENDED RELEASE TRANSDERMAL at 14:14

## 2025-03-16 RX ADMIN — HEPARIN SODIUM 5000 UNITS: 5000 INJECTION, SOLUTION INTRAVENOUS; SUBCUTANEOUS at 05:14

## 2025-03-16 RX ADMIN — ATORVASTATIN CALCIUM 40 MG: 40 TABLET, FILM COATED ORAL at 17:30

## 2025-03-16 RX ADMIN — CARVEDILOL 6.25 MG: 6.25 TABLET, FILM COATED ORAL at 17:30

## 2025-03-16 RX ADMIN — AMLODIPINE BESYLATE 5 MG: 5 TABLET ORAL at 05:14

## 2025-03-16 RX ADMIN — CARVEDILOL 6.25 MG: 6.25 TABLET, FILM COATED ORAL at 12:17

## 2025-03-16 RX ADMIN — REGADENOSON 0.4 MG: 0.08 INJECTION, SOLUTION INTRAVENOUS at 10:49

## 2025-03-16 RX ADMIN — AMINOPHYLLINE 100 MG: 25 INJECTION, SOLUTION INTRAVENOUS at 10:55

## 2025-03-16 RX ADMIN — ASPIRIN 81 MG: 81 TABLET, CHEWABLE ORAL at 05:14

## 2025-03-16 RX ADMIN — HEPARIN SODIUM 5000 UNITS: 5000 INJECTION, SOLUTION INTRAVENOUS; SUBCUTANEOUS at 14:14

## 2025-03-16 ASSESSMENT — ACTIVITIES OF DAILY LIVING (ADL): TOILETING: INDEPENDENT

## 2025-03-16 ASSESSMENT — GAIT ASSESSMENTS: GAIT LEVEL OF ASSIST: UNABLE TO PARTICIPATE

## 2025-03-16 ASSESSMENT — COGNITIVE AND FUNCTIONAL STATUS - GENERAL
WALKING IN HOSPITAL ROOM: A LITTLE
MOVING TO AND FROM BED TO CHAIR: A LITTLE
SUGGESTED CMS G CODE MODIFIER MOBILITY: CK
MOBILITY SCORE: 19
TOILETING: A LITTLE
STANDING UP FROM CHAIR USING ARMS: A LITTLE
SUGGESTED CMS G CODE MODIFIER DAILY ACTIVITY: CK
DRESSING REGULAR LOWER BODY CLOTHING: A LITTLE
DAILY ACTIVITIY SCORE: 19
HELP NEEDED FOR BATHING: A LITTLE
DRESSING REGULAR UPPER BODY CLOTHING: A LITTLE
CLIMB 3 TO 5 STEPS WITH RAILING: A LOT
PERSONAL GROOMING: A LITTLE

## 2025-03-16 ASSESSMENT — ENCOUNTER SYMPTOMS
FEVER: 0
DIZZINESS: 1
GASTROINTESTINAL NEGATIVE: 1
WEAKNESS: 1
FOCAL WEAKNESS: 1
SENSORY CHANGE: 1
MYALGIAS: 1
CHILLS: 0
SHORTNESS OF BREATH: 1
EYES NEGATIVE: 1

## 2025-03-16 ASSESSMENT — FIBROSIS 4 INDEX: FIB4 SCORE: 5.15

## 2025-03-16 ASSESSMENT — PAIN DESCRIPTION - PAIN TYPE
TYPE: ACUTE PAIN

## 2025-03-16 ASSESSMENT — LIFESTYLE VARIABLES: SUBSTANCE_ABUSE: 1

## 2025-03-16 NOTE — ASSESSMENT & PLAN NOTE
The ASCVD Risk score (Monalisa ARAUJO, et al., 2019) failed to calculate for the following reasons:    The 2019 ASCVD risk score is only valid for ages 40 to 79  Patient is on high risk for coronary artery disease  Her chest pain possibly related to hypertensive emergency  -EKG did not show ischemia and troponin around 21  -Continue telemetry   -TTE EF 62%; mild LVH; moderate to severe Aortic insufficiency  -Cardiac stress test no reversible defects  -Aspirin and atorvastatin  -Encouraged the patient to quit smoking

## 2025-03-16 NOTE — ASSESSMENT & PLAN NOTE
Baseline unknown, presented with Cr 1.48. Ultrasound did show signs of medical renal disease, likely has underlying CKD. Suspect from uncontrolled hypertension.  Creatinine increased to 1.8 and urine became dark brown. Urinalysis showed increase in RBCs. FENa more consistent with prerenal but strong suspicion for ATN. I&Os initially not well documented because patient voided on bed. PureWick placed.  - Gentle IV fluids  - Monitor I&Os  - Renally adjust medications and avoid nephrotoxins  - FENA 0.8% yesterday, possible pre-renal vs ATN. Continue fluids at 83 cc/h for another 12 hrs

## 2025-03-16 NOTE — ASSESSMENT & PLAN NOTE
"-Palliative care was consulted for goals of care discussions given patient continues to be full code even after extensive discussions with our team. Patient expresses hesitation and says \"I do not care\". However, wants to stay alive so she can finish the quilt she is making for her son.  -Discussed with patient's son at bedside and the patient herself about possible postacute rehab that was recommended by PT/OT.  We also discussed the CODE STATUS.  They decided to talk amongst themselves and report back to us what they want to decide.  Patient will likely be going to subacute rehab upon discharge.  We will continue to follow.  "

## 2025-03-16 NOTE — ASSESSMENT & PLAN NOTE
Came with the blood pressure more than 200/100 and chest pain with signs of stroke  CTA for head and neck unremarkable. MRI showing patchy area of acute infarction in the left frontal parietal periventricular white matter extending inferiorly into the left posterior and lateral thalamus.  Also noting chronic ischemic changes and chronic lacunar infarct. See acute ischemic stroke for details on management. No heart failure on TTE.  -Continue with amlodipine and carvedilol  -Labetalol IV as needed  - Continue atorvastatin 40 mg

## 2025-03-16 NOTE — THERAPY
"Physical Therapy   Initial Evaluation     Patient Name: Coty Valdivia  Age:  79 y.o., Sex:  female  Medical Record #: [id number]  Today's Date: 3/16/2025     Precautions  Precautions: (P) Fall Risk    Assessment  Patient is 79 y.o. female admitted with complaints of right sided weakness, confusion, and chest pain. MRI brain, cardiac stress test, echocardiogram, ultrasound renal pursued and pending results. PMH includes smoking and HTN.    Patient received in bed and agreeable to PT session. Pt able to perform stand step transfer to the chair with FWW and Feliz. Pt agreeable to sit up for breakfast however echo at bedside requesting pt return to supine. Pt able to perform stand step transfer with no AD and Feliz to return back to bed. Pt reporting increased dizziness, BP in supine reading 213/89, RN aware. Pt is primarily limited by impaired functional strength, balance, and activity tolerance. Currently recommend post acute placement. Will follow for acute care PT needs.    Plan    Physical Therapy Initial Treatment Plan   Treatment Plan : (P) Bed Mobility, Gait Training, Neuro Re-Education / Balance, Self Care / Home Evaluation, Stair Training, Therapeutic Activities, Therapeutic Exercise  Treatment Frequency: (P) 4 Times per Week  Duration: (P) Until Therapy Goals Met    DC Equipment Recommendations: (P) Unable to determine at this time  Discharge Recommendations: (P) Recommend post-acute placement for additional physical therapy services prior to discharge home       Subjective    \"I probably should use a cane or walker but I don't\".      Objective       03/16/25 0851   Initial Contact Note    Initial Contact Note Order Received and Verified, Physical Therapy Evaluation in Progress with Full Report to Follow.   Precautions   Precautions Fall Risk   Vitals   O2 (LPM) 0   O2 Delivery Device None - Room Air   Vitals Comments sitting /74. Supine BP end of session 213/87. Pt reporting incraesed dizziness. RN aware " of BP readings.   Pain 0 - 10 Group   Therapist Pain Assessment Post Activity Pain Same as Prior to Activity;Nurse Notified  (pain not rated)   Prior Living Situation   Prior Services Intermittent Physical Support for ADL Per Family   Housing / Facility Mobile Home   Steps Into Home 4   Equipment Owned Front-Wheel Walker;Single Point Cane   Lives with - Patient's Self Care Capacity Adult Children   Comments Pt lives with her adult son. Pt reprots her son works different days/times, pt is home alone for periods of time.   Prior Level of Functional Mobility   Bed Mobility Independent   Transfer Status Independent   Ambulation Independent   Ambulation Distance household   Assistive Devices Used None   Stairs Required Assist   History of Falls   History of Falls No   Date of Last Fall   (pt reports no falls)   Cognition    Cognition / Consciousness X   Speech/ Communication Delayed Responses   Level of Consciousness Alert   Comments significantly delayed responses, pleasant and participatory   Active ROM Lower Body    Comments WFL however RLE limited 2/2 weakness   Strength Lower Body   Lower Body Strength  X   Comments generalized weakness, LLE grossly 4/5, RLE grossly 3/5   Sensation Lower Body   Lower Extremity Sensation   WDL   Comments declines N/T   Balance Assessment   Sitting Balance (Static) Fair +   Sitting Balance (Dynamic) Fair   Standing Balance (Static) Fair -   Standing Balance (Dynamic) Fair -   Weight Shift Sitting Fair   Weight Shift Standing Poor   Bed Mobility    Supine to Sit Standby Assist   Sit to Supine Standby Assist   Scooting Standby Assist   Comments HOB flat   Gait Analysis   Gait Level Of Assist Unable to Participate   Functional Mobility   Sit to Stand Minimal Assist   Bed, Chair, Wheelchair Transfer Minimal Assist   Mobility bed <> chair   6 Clicks Assessment - How much HELP from from another person do you currently need... (If the patient hasn't done an activity recently, how much help  from another person do you think he/she would need if he/she tried?)   Turning from your back to your side while in a flat bed without using bedrails? 4   Moving from lying on your back to sitting on the side of a flat bed without using bedrails? 4   Moving to and from a bed to a chair (including a wheelchair)? 3   Standing up from a chair using your arms (e.g., wheelchair, or bedside chair)? 3   Walking in hospital room? 3   Climbing 3-5 steps with a railing? 2   6 clicks Mobility Score 19   Short Term Goals    Short Term Goal # 1 Pt will be able to perform STS with FWW and PSV in 6 visits to progress functional transfers   Short Term Goal # 2 Pt will be able to ambulate 50ft with FWW and SPV to improve functional gait   Short Term Goal # 3 Pt will be able to ascend/descend 4 stairs with CGA in 6 visits in order to safely navigate her home   Education Group   Education Provided Role of Physical Therapist   Role of Physical Therapist Patient Response Patient;Acceptance;Explanation;Verbal Demonstration   Physical Therapy Initial Treatment Plan    Treatment Plan  Bed Mobility;Gait Training;Neuro Re-Education / Balance;Self Care / Home Evaluation;Stair Training;Therapeutic Activities;Therapeutic Exercise   Treatment Frequency 4 Times per Week   Duration Until Therapy Goals Met   Problem List    Problems Impaired Transfers;Impaired Ambulation;Functional Strength Deficit;Impaired Balance;Decreased Activity Tolerance;Safety Awareness Deficits / Cognition   Anticipated Discharge Equipment and Recommendations   DC Equipment Recommendations Unable to determine at this time   Discharge Recommendations Recommend post-acute placement for additional physical therapy services prior to discharge home   Interdisciplinary Plan of Care Collaboration   IDT Collaboration with  Nursing;Occupational Therapist   Patient Position at End of Therapy In Bed;Bed Alarm On;Call Light within Reach;Tray Table within Reach;Phone within Reach    Collaboration Comments RN updated   Session Information   Date / Session Number  3/16 - 1 (1/4, 3/22)

## 2025-03-16 NOTE — PROGRESS NOTES
Assessment completed. Pt A&Ox3, disoriented to time. Respirations are even and unlabored on RA. Pt denies pain at this time. Monitors applied, VS stable, call light and belongings within reach. POC updated (MRI, echo, renal us, stress test, PT/OT, UA). Pt educated on room and call light, pt verbalized understanding. Communication board updated. Needs met. NPO at midnight for testing.

## 2025-03-16 NOTE — PROGRESS NOTES
[facility] Medicine Daily Progress Note    Date of Service  3/16/2025    Chief Complaint  Coty Valdivia is a 79 y.o. female admitted 3/15/2025 with right-sided numbness, confusion, chest pain    Hospital Course  Ms. Coty Valdivia is an 79-year-old female with history of smoking and hypertension who presented 3/15 with right-sided weakness, confusion, and chest pain.      Patient lives with her son, patient is poor historian and not able to provide a good history, patient noticed weakness on the left arm and leg 4 days before the admission with a chest pain, pressure chest pain, does not radiate, no loss of consciousness or other symptoms.  On admission patient was found to have elevated blood pressure 190/94, according to her son patient's taking her medication regularly at home?  EKG did not show any ischemia and troponin 22 and 21 with , creatinine was 1.4 and no history of CKD?  Patient will be admitted to the hospital for hypertensive emergency and possible stroke.  Patient admitted to hospital medicine for management of care.    During this hospitalization, we will pursue an MRI brain, cardiac stress test, echocardiogram, ultrasound renal, and PT/OT.  Urine analysis negative for any signs of infectious process.  CT head and neck negative for any acute findings.  Will wait for the results for plan of care.    Interval Problem Update  Patient seen and examined.  Discussed plan of care with patient and son who was at bedside.  At this time, patient continues to have some right sided numbness and intermittent chest pain.  Patient's mentation is back to baseline and is alert and oriented x 3, however, patient unable to give details while she is hospitalized.  Plan of care: Pursue MRI brain, obtain cardiac stress test and echocardiogram, await ultrasound renal results.  Disposition: Patient anticipated to stay overnight until all imaging has been resulted  Lab work: Reviewed: Expected  VSS at this time    I  have discussed this patient's plan of care and discharge plan at IDT rounds today with Case Management, Nursing, Nursing leadership, and other members of the IDT team.    Consultants/Specialty  NONE    Code Status  Full Code    Disposition  The patient is not medically cleared for discharge to home or a post-acute facility.  Anticipate discharge to: home with close outpatient follow-up    I have placed the appropriate orders for post-discharge needs.    Review of Systems  Review of Systems   Constitutional:  Positive for malaise/fatigue. Negative for chills and fever.   HENT: Negative.     Eyes: Negative.    Respiratory:  Positive for shortness of breath.    Cardiovascular:  Positive for chest pain.   Gastrointestinal: Negative.    Genitourinary: Negative.    Musculoskeletal:  Positive for myalgias.   Skin: Negative.    Neurological:  Positive for dizziness, sensory change, focal weakness and weakness.   Psychiatric/Behavioral:  Positive for substance abuse.         Physical Exam  Temp:  [36.6 °C (97.9 °F)-37.4 °C (99.4 °F)] 36.6 °C (97.9 °F)  Pulse:  [52-75] 69  Resp:  [14-52] 16  BP: (114-228)/(57-94) 167/72  SpO2:  [93 %-98 %] 94 %    Physical Exam  Vitals and nursing note reviewed.   HENT:      Head: Normocephalic.      Nose: Nose normal.      Mouth/Throat:      Mouth: Mucous membranes are moist.      Pharynx: Oropharynx is clear.   Eyes:      Pupils: Pupils are equal, round, and reactive to light.   Cardiovascular:      Rate and Rhythm: Normal rate and regular rhythm.      Pulses: Normal pulses.   Pulmonary:      Effort: Pulmonary effort is normal.      Breath sounds: Normal breath sounds.   Abdominal:      General: Bowel sounds are normal.      Palpations: Abdomen is soft.   Musculoskeletal:         General: Tenderness present.      Cervical back: Normal range of motion and neck supple.   Skin:     General: Skin is dry.      Capillary Refill: Capillary refill takes 2 to 3 seconds.   Neurological:      Mental  Status: She is alert. Mental status is at baseline.      Motor: Weakness present.      Gait: Gait abnormal.         Fluids  No intake or output data in the 24 hours ending 03/16/25 1113     Laboratory  Recent Labs     03/15/25  1044 03/16/25  0011   WBC 5.3 7.0   RBC 4.68 4.47   HEMOGLOBIN 14.9 14.5   HEMATOCRIT 43.9 41.4   MCV 93.8 92.6   MCH 31.8 32.4   MCHC 33.9 35.0   RDW 45.7 45.8   PLATELETCT 166 161*   MPV 10.7 10.7     Recent Labs     03/15/25  1044 03/16/25  0011   SODIUM 141 139   POTASSIUM 3.9 3.9   CHLORIDE 108 106   CO2 20 20   GLUCOSE 87 102*   BUN 27* 26*   CREATININE 1.48* 1.43*   CALCIUM 9.5 9.2     Recent Labs     03/15/25  1044   APTT 29.5   INR 0.97         Recent Labs     03/16/25  0011   TRIGLYCERIDE 178*   HDL 63   *       Imaging  EC-ECHOCARDIOGRAM COMPLETE W/O CONT         CT-CTA NECK WITH & W/O-POST PROCESSING   Final Result      CT angiogram of the neck within normal limits.      CT-CTA HEAD WITH & W/O-POST PROCESS   Final Result      CT angiogram of the [place] within normal limits.      CT-CEREBRAL PERFUSION ANALYSIS   Final Result      1. Cerebral blood flow less than 30% possibly representing completed infarct = 0 mL. Based on distribution of this finding, this is unlikely to represent artifact.      2. T Max more than 6 seconds possibly representing combination of completed infarct and ischemia = 0 mL. Based on the distribution of this finding, this is unlikely to represent artifact.      3. Mismatched volume possibly representing ischemic brain/penumbra= 0 mL      4.  Please note that this cerebral perfusion study and report is Quantitative and targets supratentorial (cerebral) perfusion for evaluation of large vessel territory acute ischemia/infarction. For example, lacunar infarcts, and brainstem/posterior fossa    ischemia/infarction are not evaluated on this study.  Data acquisition is subject to artifacts which can yield non-anatomically plausible perfusion maps  "which may be due to motion, bolus timing, signal to noise ratio, or other technical factors.    Perfusion map abnormalities which show non-anatomic distributions are likely artifact.   This study is not \"stand-alone\" and should only be utilized for diagnosis, management/treatment in correlation with CT, CTA, and/or MRI and clinical factors.         DX-CHEST-PORTABLE (1 VIEW)   Final Result         1. Mild coarse interstitial lung markings but no confluent infiltrate or effusion.                     MR-BRAIN-W/O    (Results Pending)   NM-CARDIAC STRESS TEST    (Results Pending)   US-RENAL    (Results Pending)        Assessment/Plan  * Hypertensive emergency- (present on admission)  Assessment & Plan  Came with the blood pressure more than 200/100 and chest pain with signs of stroke  CTA for head and neck did not show any stenosis  MRI is pending  Echo  Start with amlodipine and carvedilol  Labetalol IV as needed    ACP (advance care planning)  Assessment & Plan  Plan of care was discussed in detail with the patient and son at bedside, answered all their questions, discussed the CODE STATUS, patient wants to be full code at this time and all treatment.  Time 25 minutes    Right sided weakness  Assessment & Plan  Patient is on high risk for stroke  Mild weakness and discoordination on the right side  CT scan did not show any acute finding and no bleeding also CTA with no stenosis for neck and head  MRI is pending  Aspirin and atorvastatin  Telemetry    Malnutrition (HCC)  Assessment & Plan  With history of smoking and weight loss  Patient is on high risk for malignancy  Needs malignancy screening as outpatient    ERROL (acute kidney injury) (HCC)  Assessment & Plan  Creatinine 1.4, unknown her baseline  Order ultrasound  Likely related to uncontrolled hypertension  Check A1c  Amlodipine and carvedilol    Smoking  Assessment & Plan  Patient recounseled and reeducated on smoking cessation    Patient is cancer screening " with CT scan for chest    Chest pain  Assessment & Plan  The ASCVD Risk score (Monalisa DK, et al., 2019) failed to calculate for the following reasons:    The 2019 ASCVD risk score is only valid for ages 40 to 79  Patient is on high risk for coronary artery disease  Her chest pain possibly related to hypertensive emergency  EKG did not show ischemia and troponin around 21  Continue telemetry and repeat troponin  Echo and stress test  Aspirin and atorvastatin  Encouraged the patient to quit smoking         VTE prophylaxis:    heparin ppx      I have performed a physical exam and reviewed and updated ROS and Plan today (3/16/2025). In review of yesterday's note (3/15/2025), there are no changes except as documented above.    IKirsty DNP performed a substantiated portion of the service face-to-face with same patient on the same date of service INDEPENDENTLY FROM THE MD AND ASSESSMENT, EXAMINATION, DISCUSSION PLAN OF CARE FOR 26 MINUTES.  I was personally involved in reviewing and conducting the medical decision making, including the information as described above.

## 2025-03-16 NOTE — PROGRESS NOTES
4 Eyes Skin Assessment Completed by Nya WRIGHT RN and Kiara WYLIE RN.    Head WDL  Ears Redness and Blanching  Nose WDL  Mouth WDL  Neck WDL  Breast/Chest WDL  Shoulder Blades WDL  Spine WDL  (R) Arm/Elbow/Hand Redness and Blanching  (L) Arm/Elbow/Hand Redness, Blanching, and Bruising  Abdomen WDL  Groin WDL  Scrotum/Coccyx/Buttocks WDL  (R) Leg WDL  (L) Leg WDL  (R) Heel/Foot/Toe Redness and Blanching  (L) Heel/Foot/Toe Redness, Blanching, and Scar, amputation L great toe.           Devices In Places Tele Box, Blood Pressure Cuff, and Pulse Ox, purewick       Interventions In Place Pillows and Pressure Redistribution Mattress    Possible Skin Injury No    Pictures Uploaded Into Epic N/A  Wound Consult Placed N/A  RN Wound Prevention Protocol Ordered No

## 2025-03-16 NOTE — ASSESSMENT & PLAN NOTE
Patient is on high risk for stroke  Mild weakness and discoordination on the right side  CT scan did not show any acute finding and no bleeding also CTA with no stenosis for neck and head  MRI showing patchy area of acute infarction in the left frontal parietal periventricular white matter extending inferiorly into the left posterior and lateral thalamus.  Also noting chronic ischemic changes and chronic lacunar infarct. Acute findings consistent with symptoms.  -Aspirin and atorvastatin  -Telemetry  - See acute ischemic stroke for more details

## 2025-03-16 NOTE — CARE PLAN
The patient is Stable - Low risk of patient condition declining or worsening    Shift Goals  Clinical Goals: echo, stress test, MRI, renal ultrasound, PT/OT, UA  Patient Goals: eat  Family Goals: NIMA    Progress made toward(s) clinical / shift goals:        Problem: Optimal Care of the Stroke Patient  Goal: Optimal emergency care for the stroke patient  3/15/2025 2140 by Gisella Rainey R.N.  Outcome: Progressing  3/15/2025 2139 by Gisella Rainey R.N.  Outcome: Progressing  Goal: Optimal acute care for the stroke patient  Outcome: Progressing     Problem: Dysphagia  Goal: Dysphagia will improve  Outcome: Progressing       Patient is not progressing towards the following goals:

## 2025-03-16 NOTE — HOSPITAL COURSE
Coty Valdivia, an 79-year-old female with a history of smoking and hypertension, was admitted on March 15, 2025, due to right-sided weakness, confusion, and chest pain. On admission, she presented with elevated blood pressure and symptoms suggestive of a hypertensive emergency and possible stroke. Initial evaluations, including an EKG and troponin tests, indicated no ischemia, though elevated troponin levels were noted. MRI revealed a patchy area of acute infarction in the left frontal parietal periventricular white matter and chronic ischemic changes. Neurology was consulted, and appropriate stroke management was initiated, including aspirin and atorvastatin, alongside PT/OT/SLP interventions.    During her hospital stay, Ms. Valdivia's management focused on controlling her blood pressure with amlodipine, carvedilol, and labetalol as needed. Her chest pain was attributed to hypertensive emergency, and telemetry monitoring was continued. Despite her medical issues, she remained hemodynamically stable with no new concerns and was medically cleared for discharge to a skilled nursing facility for further rehabilitation.    Her course was complicated by acute kidney injury, likely secondary to uncontrolled hypertension, with suspicion of ATN. However, CKD cannot be ruled out as there are non records of prior serum creatinine.. Conservative fluid management and renal adjustment of medications were implemented. The patient was educated on smoking cessation. PT/OT/SLP evaluated patient. Recommended subacute rehab and outpatient SLP.    Overall, Ms. Valdivia's hospitalization focused on managing her acute ischemic stroke, hypertensive emergency, and associated complications, preparing her for post-acute care in a skilled nursing facility.

## 2025-03-16 NOTE — ASSESSMENT & PLAN NOTE
Patient recounseled and reeducated on smoking cessation    Patient needs cancer screening with CT scan for chest

## 2025-03-16 NOTE — ASSESSMENT & PLAN NOTE
With history of smoking and weight loss  Patient is on high risk for malignancy  Needs malignancy screening as outpatient  - Consider nutrition consult

## 2025-03-16 NOTE — CARE PLAN
The patient is Stable - Low risk of patient condition declining or worsening    Shift Goals  Clinical Goals: echo, stress test, pt/ot, MRI, renal us, tele monitoring  Patient Goals: eat, rest  Family Goals: no family at bedside    Progress made toward(s) clinical / shift goals:    Problem: Optimal Care of the Stroke Patient  Goal: Optimal emergency care for the stroke patient  Description: Target End Date:  End of day 1Time of Onset1.  Time of last known well obtained2.  Patient and family/caregiver verbalize understanding of diagnosis, medications and testing3.  NIHSS performed and documented, including date and time, for ischemic stroke patients prior to any acute recanalization therapy (thrombolytics or mechanical) or within 12 hours of arrival if no intervention is warranted4.  Consults and referrals placed to appropriate departmentsMedications Administration as Ordered:1.  Implement appropriate reversal agents for INR greater than 1.52.  Pre-alteplase administration of antihypertensives for SBP >185 DBP >1103.  Post-alteplase administration of antihypertensives for SBP >185, DBP >1054.  Thrombolytic Therapy for qualifying ischemic stroke patients who arrive within 4.5 hours of time of Last Known Well. Thrombolytic therapy administered within 30 minutes or a documented reason for delay  Outcome: Progressing  Goal: Optimal acute care for the stroke patient  Description: Target End Date:  1 to 3 days- Vital signs and neuro checks performed and documented per order- NIHSS completed and documented per order- Continuous telemetry monitoring for 72 hours or until discontinued by provider- Head CT without contrast obtained- Consideration of MRI/MRA- MRI screening form completed in worklist if MRI ordered- Echocardiogram with Bubble Study ordered/completed with consideration of CARLOS- Carotid Doppler ordered/completed (Not required if CTA of neck completed in ED)- Lipid Panel obtained within 48 hours of admission- PT,  PTT, INR obtained per Anticoagulation orders (if applicable)- Antithrombotic therapy by end of hospital day 2 for ischemic stroke. Provider must document reason if contraindicated.- Venous Thromboembolism (VTE) Prophylaxis by end of hospital day 2 for ischemic and hemorrhagic stroke. Provider must document reason if contraindicated- Dysphagia screen completed and documented prior to any PO intake. Patient to remain NPO until Speech Therapy evaluation if thrombolytic or thrombectomy performed- Rehabilitation assessment including PT/OT/SLP evaluations for referral to Physical Medicine and Rehabilitation services. If none needed, provider needs to document reason- Neurology consult placed- Consideration of cardiology consult for cryptogenic strokes  Outcome: Progressing     Problem: Knowledge Deficit - Stroke Education  Goal: Patient's knowledge of stroke and risk factors will improve  Description: Target End Date:  1-3 days or as soon as patient condition allowsDocument in Patient Education1.  Stroke education booklet provided2.  Education regarding EMS activation, need for follow up, medication prescribed at discharge, risk factors for stroke/lifestyle modifications, warning signs and symptoms of stroke provided  Outcome: Progressing     Problem: Psychosocial - Patient Condition  Goal: Patient's ability to verbalize feelings about condition will improve  Description: Target End Date:  Prior to discharge or change in level of care1.  Discuss coping with medical condition and its effects2.  Encourage patient participation in care3.  Encourage acknowledgement of body changes and accompanying emotions4.  Perform depression screening  Outcome: Progressing  Goal: Patient's ability to re-evaluate and adapt role responsibilities will improve  Description: Target End Date:  Prior to discharge or change in level of care1.  Assess family support2.  Encourage support system participation in care3.  Encouraged verbalization of  feelings regarding caregiver responsibilities4.  Discuss changes in role and responsibilities caused by patient's condition  Outcome: Progressing     Problem: Discharge Planning - Stroke  Goal: Ensure Stroke Core Measures are met prior to discharge  Description: Target End Date:  Prior to discharge or change in level of care1. Patient discharged on antithrombotic therapy (Ischemic Stroke)2. Patient discharged on intensive statin if LDL is greater than or equal to 70 mg/dl (Ischemic Stroke)3. Patient discharged on anticoagulation therapy for patients with atrial fibrillation/flutter (Ischemic Stroke)4. Smoking education/cessation provided if applicable  Outcome: Progressing  Goal: Patient’s continuum of care needs will be met  Description: Target End Date:  Prior to discharge or change in level of care1.  Potential discharge barriers identified upon admission and throughout hospital stay2.  Ensure appropriate referrals in place for follow up with specialists after discharge3.  Ensure appropriate referrals in place for DMEs if applicable4.  Collaboration with transitional care team and interdisciplinary team to meet discharge needs5.  Involve patient and family/caregiver in prioritizing goals for discharge planning6.  Educate patient and caregiver about discharge instructions, medications, and follow up appointments7.  Referral placed to [facility].  Assure orders and follow up appointments are made for outpatient extended cardiac monitoring if appropriate  Outcome: Progressing     Problem: Neuro Status  Goal: Neuro status will remain stable or improve  Description: Target End Date:  Prior to discharge or change in level of careDocument on Neuro assessment in the Assessment flowsheet1.  Assess and monitor neurologic status per provider order/protocol/unit policy2.  Assess level of consciousness and orientation3.  Assess for speech, dysarthria, dysphagia, facial symmetry4.  Assess visual field, eye  movements, gaze preference, pupil reaction and size5.  Assess muscle strength and motor response in all four extremities6.  Assess for sensation (numbness and tingling)7.  Assess basic neuro reflexes (cough, gag, corneal)8.  Identify changes in neuro status and report to provider for testing/treatment orders  Outcome: Progressing     Problem: Hemodynamic Monitoring  Goal: Patient's hemodynamics, fluid balance and neurologic status will be stable or improve  Description: Target End Date:  Prior to discharge or change in level of care1.  Vital signs, pulse oximetry and cardiac monitor per provider order and/or policy2.  Frequent pulse checks performed post thrombectomy3.  Frequent monitoring for signs of bleeding post TPA administration4.  Proper management of IV infusions5.  Intake and output monitored per provider order6.  Daily weight obtained per unit policy or provider order7.  Peripheral pulses and capillary refill assessed as needed8.  Monitor for signs/symptoms of excessive bleeding9.  Body temperature assessed and fevers [id number]. Patient positioned for maximum circulation/cardiac output  Outcome: Progressing     Problem: Respiratory - Stroke Patient  Goal: Patient will achieve/maintain optimum respiratory rate/effort  Description: Target End Date:  Prior to discharge or change in level of careDocument on Assessment flowsheet1.  Assess and monitor respiratory rate, rhythm, depth and effort of respiration2.  Oxygenation assessed throughout shift (recommendation of >94% for new stroke patients)3.  Oxygen administered and/or titrated per order4.  Collaboration with RT to administer medication/treatments per order5.  Patient educated on importance of turning, coughing, and deep breathing6.  Patient positioned for maximum ventilatory efficiency7.  Airway suctioning provided as needed8.  Incentive spirometry encouraged 5-10 times every hour or while awake  Outcome: Progressing     Problem: Dysphagia  Goal:  Dysphagia will improve  Description: Target End Date:  Prior to discharge or change in level of care1.  Assess and monitor ability to swallow2.  Collaborate with Speech Therapy to determine appropriate adaptation for safe administration of medications and oral nutrition3.  Elevate head of bed to 90 degrees during feedings and for 30 minutes after each feeding4.  Encourage proper swallowing techniques5.  Screening on admission or as soon as possible  Outcome: Progressing     Problem: Risk for Aspiration  Goal: Patient's risk for aspiration will be absent or decrease  Description: Target End Date:  Prior to discharge or change in level of care1.   Complete dysphagia screening on admission2.   NPO until dysphagia screening complete or medically cleared3.   Collaborate with Speech Therapy, Clinical Dietitian and interdisciplinary team4.   Implement aspiration precautions5.   Assist patient up to chair for meals6.   Elevate head of bed 90 degrees if patient is unable to get out of bed7.   Encourage small bites8.   Ensure foods/liquids are of appropriate consistency9.   Assess for any signs/symptoms of [id number]. Assess breath sounds and vital signs after oral intake  Outcome: Progressing     Problem: Urinary Elimination  Goal: Establish and maintain regular urinary output  Description: Target End Date:  Prior to discharge or change in level of careDocument on I/O and Assessment flowsheets1.  Evaluate need to continue indwelling catheter every shift2.  Assess signs and symptoms of urinary retention3.  Assess post-void residual volumes4.  Implement bladder training program5.  Encourage scheduled voidings6.  Assist patient to sit on bedside commode or toilet for voiding7.  Educate patient and family/caregiver on use and purpose of urine collection devices (document in Patient Education)  Outcome: Progressing     Problem: Bowel Elimination  Goal: Establish and maintain regular bowel function  Description: Target End Date:   Prior to discharge or change in level of care1.   Note date of last BM2.   Educate about diet, fluid intake, medication and activity to promote bowel function3.   Educate signs and symptoms of constipation and interventions to implement4.   Pharmacologic bowel management per provider order5.   Regular toileting schedule6.   Upright position for toileting7.   High fiber diet8.   Encourage hydration9.   Collaborate with Clinical [id number]. Care and maintenance of ostomy if applicable  Outcome: Progressing     Problem: Mobility - Stroke  Goal: Patient's capacity to carry out activities will improve  Description: Target End Date:  Prior to discharge or change in level of care1.  Assess for barriers to mobility/activity2.  Implement activity per interdisciplinary team recommendations3.  Target activity level identified and patient/family/caregiver aware of goal4.  Provide assistive devices5.  Instruct patient/caregiver on proper use of assistive/adaptive devices6.  Schedule activities and rest periods to decrease effects of fatigue7.  Encourage mobilization to extent of ability8.  Maintain proper body alignment9.  Provide adequate pain management to allow progressive [id number]. Implement pace maker precautions as needed  Outcome: Progressing  Goal: Spasticity will be prevented or improved  Description: Target End Date:  Prior to discharge or change in level of care1.  Muscle relaxing agents considered or administered per order2.  Splints applied properly and used accordingly to therapist's recommendations3.  Assistance with stretching and range of motion exercises provided  Outcome: Progressing  Goal: Subluxation will be prevented or improved  Description: Target End Date:  Prior to discharge or change in level of care1.   Ensure proper handling during transfers, ambulation, and repositioning in bed2.   Reduce traction to affected limb and provide adequate support of weight3.   Perform passive range of motion4.   Assist with active range of motion  Outcome: Progressing     Problem: Self Care  Goal: Patient will have the ability to perform ADLs independently or with assistance (bathe, groom, dress, toilet and feed)  Description: Target End Date:  Prior to discharge or change in level of careDocument on ADL flowsheet1.  Assess the capability and level of deficiency to perform ADLs2.  Encourage family/care giver involvement3.  Provide assistive devices4.  Consider PT/OT evaluations5.  Maintain support, give positive feedback, encourage self-care allowing extra time and verbal cuing as needed6.  Avoid doing something for patients they can do themselves, but provide assistance as needed7.  Assist in anticipating/planning individual needs8.  Collaborate with Case Management and  to meet discharge needs  Outcome: Progressing     Problem: Knowledge Deficit - Standard  Goal: Patient and family/care givers will demonstrate understanding of plan of care, disease process/condition, diagnostic tests and medications  Description: Target End Date:  1-3 days or as soon as patient condition allowsDocument in Patient Education1.  Patient and family/caregiver oriented to unit, equipment, visitation policy and means for communicating concern2.  Complete/review Learning Assessment3.  Assess knowledge level of disease process/condition, treatment plan, diagnostic tests and medications4.  Explain disease process/condition, treatment plan, diagnostic tests and medications  Outcome: Progressing       Patient is not progressing towards the following goals:

## 2025-03-16 NOTE — THERAPY
Occupational Therapy   Initial Evaluation     Patient Name: Coty Valdivia  Age:  79 y.o., Sex:  female  Medical Record #: [id number]  Today's Date: 3/16/2025     Precautions: Fall Risk    Assessment  Patient is 79 y.o. female with history of smoking and HTN who presented 3/15 with right-sided weakness.  Patient lives with her son, patient is poor historian and not able to provide a good history, patient noticed weakness on the left arm and leg 4 days before the admission with a chest pain, pressure chest pain, does not radiate, no loss of consciousness or other symptoms. CT and CTA did not show any acute findings or stenosis. MRI pending. Pt seen for OT eval. Pt currently resides with her son in a mobile home and was independent with ADLs and IADLs.     During OT eval, pt presented with deficits in self-care tasks, balance, functional mobility, cognition, strength, and activity tolerance. She required increased time to respond to questions/cues and complete ADL tasks. She required min A to complete most ADLs and txfs using FWW.  Occupational Therapy session overlapped with Physical Therapy to help facilitate functional transfer. Pt reported dizziness with activity and BP was 213/87 following activity; RN notified. Currently recommend post-acute placement prior to DC home. Will continue to follow for ongoing acute OT services.     Plan    Occupational Therapy Initial Treatment Plan   Treatment Interventions: Self Care / Activities of Daily Living, Adaptive Equipment, Neuro Re-Education / Balance, Therapeutic Exercises, Therapeutic Activity  Treatment Frequency: 3 Times per Week  Duration: Until Therapy Goals Met    DC Equipment Recommendations: Unable to determine at this time  Discharge Recommendations: Recommend post-acute placement for additional occupational therapy services prior to discharge home      Objective    Prior Living Situation   Prior Services Intermittent Physical Support for ADL Per Family   Housing  / Facility Mobile Home   Steps Into Home 4   Steps In Home 0   Bathroom Set up Bathtub / Shower Combination   Equipment Owned Front-Wheel Walker;Single Point Cane   Lives with - Patient's Self Care Capacity Adult Children   Comments Pt currently resides with her son who works outside of the home at variable times. Able to assist while at home   Prior Level of ADL Function   Self Feeding Independent   Grooming / Hygiene Independent   Bathing Independent   Dressing Independent   Toileting Independent  (Wears briefs at baseline due to having an overactive bladder)   Prior Level of IADL Function   Medication Management Independent   Laundry Independent   Kitchen Mobility Independent   Finances Independent   Home Management Independent   Shopping Independent   Prior Level Of Mobility Independent Without Device in Community;Independent Without Device in Home   History of Falls   History of Falls No   Date of Last Fall   (Dneied having any recent falls)   Vitals   O2 Delivery Device None - Room Air   Vitals Comments Pt with c/o dizziness with positional changes. BP seated 181/74, BP in supine 213/87. RN notified   Pain 0 - 10 Group   Therapist Pain Assessment Post Activity Pain Same as Prior to Activity;Nurse Notified  (Not rated, agreeable to activity)   Cognition    Cognition / Consciousness X   Speech/ Communication Delayed Responses   Level of Consciousness Alert   Comments Pleasant and cooperative   Active ROM Upper Body   Dominant Hand Right   Strength Upper Body   Upper Body Strength  X   Gross Strength Generalized Weakness, Equal Bilaterally.    Comments RUE weaker compared to LUE   Sensation Upper Body   Comments   (Denied having altered sensation in BUE)   Balance Assessment   Sitting Balance (Static) Fair +   Sitting Balance (Dynamic) Fair   Standing Balance (Static) Fair -   Standing Balance (Dynamic) Fair -   Weight Shift Sitting Fair   Weight Shift Standing Poor   Comments w/FWW   Bed Mobility    Supine to  Sit   (Up at EOB pre)   Sit to Supine Standby Assist   Scooting Standby Assist   Comments HOB flat   ADL Assessment   Eating Supervision   Grooming Supervision  (bed level)   Lower Body Dressing Minimal Assist  (Assist with balance while donning socks; able to bring BLE onto EOB)   Toileting Maximal Assist   How much help from another person does the patient currently need...   6 Clicks Daily Activity Score 19   Functional Mobility   Sit to Stand Minimal Assist   Bed, Chair, Wheelchair Transfer Minimal Assist   Mobility Bed <> chair; w/FWW.   Activity Tolerance   Sitting in Chair 3 min   Sitting Edge of Bed 5+ min   Standing 3 min   Comments Limited as ECHO was present at termination of session and requested that pt be returned to supine position   Patient / Family Goals   Patient / Family Goal #1 To get better   Short Term Goals   Short Term Goal # 1 Pt will complete ADL txfs with min A   Short Term Goal # 2 Pt will complete LB dressing with supv using AE PRN   Short Term Goal # 3 Pt will complete toileting ADLs with supv   Short Term Goal # 4 Pt will complete standing g/h routine with supv   Education Group   Education Provided Role of Occupational Therapist   Role of Occupational Therapist Patient Response Patient;Acceptance;Explanation;Verbal Demonstration

## 2025-03-17 PROBLEM — I63.9 CVA (CEREBRAL VASCULAR ACCIDENT) (HCC): Status: ACTIVE | Noted: 2025-03-17

## 2025-03-17 LAB
ANION GAP SERPL CALC-SCNC: 11 MMOL/L (ref 7–16)
BUN SERPL-MCNC: 28 MG/DL (ref 8–22)
CALCIUM SERPL-MCNC: 9.1 MG/DL (ref 8.5–10.5)
CHLORIDE SERPL-SCNC: 106 MMOL/L (ref 96–112)
CO2 SERPL-SCNC: 20 MMOL/L (ref 20–33)
CREAT SERPL-MCNC: 1.53 MG/DL (ref 0.5–1.4)
ERYTHROCYTE [DISTWIDTH] IN BLOOD BY AUTOMATED COUNT: 44.7 FL (ref 35.9–50)
GFR SERPLBLD CREATININE-BSD FMLA CKD-EPI: 34 ML/MIN/1.73 M 2
GLUCOSE SERPL-MCNC: 92 MG/DL (ref 65–99)
HCT VFR BLD AUTO: 40.8 % (ref 37–47)
HGB BLD-MCNC: 14.4 G/DL (ref 12–16)
MCH RBC QN AUTO: 32.1 PG (ref 27–33)
MCHC RBC AUTO-ENTMCNC: 35.3 G/DL (ref 32.2–35.5)
MCV RBC AUTO: 90.9 FL (ref 81.4–97.8)
PLATELET # BLD AUTO: 164 K/UL (ref 164–446)
PMV BLD AUTO: 10.9 FL (ref 9–12.9)
POTASSIUM SERPL-SCNC: 3.6 MMOL/L (ref 3.6–5.5)
RBC # BLD AUTO: 4.49 M/UL (ref 4.2–5.4)
SODIUM SERPL-SCNC: 137 MMOL/L (ref 135–145)
WBC # BLD AUTO: 6.5 K/UL (ref 4.8–10.8)

## 2025-03-17 PROCEDURE — A9270 NON-COVERED ITEM OR SERVICE: HCPCS | Performed by: INTERNAL MEDICINE

## 2025-03-17 PROCEDURE — 700111 HCHG RX REV CODE 636 W/ 250 OVERRIDE (IP): Performed by: INTERNAL MEDICINE

## 2025-03-17 PROCEDURE — 99233 SBSQ HOSP IP/OBS HIGH 50: CPT | Performed by: INTERNAL MEDICINE

## 2025-03-17 PROCEDURE — 99222 1ST HOSP IP/OBS MODERATE 55: CPT | Performed by: PSYCHIATRY & NEUROLOGY

## 2025-03-17 PROCEDURE — 85027 COMPLETE CBC AUTOMATED: CPT

## 2025-03-17 PROCEDURE — 96376 TX/PRO/DX INJ SAME DRUG ADON: CPT

## 2025-03-17 PROCEDURE — 770020 HCHG ROOM/CARE - TELE (206)

## 2025-03-17 PROCEDURE — 36415 COLL VENOUS BLD VENIPUNCTURE: CPT

## 2025-03-17 PROCEDURE — A9270 NON-COVERED ITEM OR SERVICE: HCPCS | Performed by: NURSE PRACTITIONER

## 2025-03-17 PROCEDURE — 80048 BASIC METABOLIC PNL TOTAL CA: CPT

## 2025-03-17 PROCEDURE — 96372 THER/PROPH/DIAG INJ SC/IM: CPT

## 2025-03-17 PROCEDURE — 700102 HCHG RX REV CODE 250 W/ 637 OVERRIDE(OP): Performed by: NURSE PRACTITIONER

## 2025-03-17 PROCEDURE — 700102 HCHG RX REV CODE 250 W/ 637 OVERRIDE(OP): Performed by: INTERNAL MEDICINE

## 2025-03-17 RX ORDER — ASPIRIN 300 MG/1
300 SUPPOSITORY RECTAL DAILY
Status: DISCONTINUED | OUTPATIENT
Start: 2025-03-17 | End: 2025-03-17

## 2025-03-17 RX ORDER — ASPIRIN 81 MG/1
81 TABLET, CHEWABLE ORAL DAILY
Status: DISCONTINUED | OUTPATIENT
Start: 2025-03-17 | End: 2025-03-17

## 2025-03-17 RX ADMIN — TIOTROPIUM BROMIDE INHALATION SPRAY 5 MCG: 3.12 SPRAY, METERED RESPIRATORY (INHALATION) at 08:00

## 2025-03-17 RX ADMIN — HEPARIN SODIUM 5000 UNITS: 5000 INJECTION, SOLUTION INTRAVENOUS; SUBCUTANEOUS at 21:48

## 2025-03-17 RX ADMIN — CARVEDILOL 6.25 MG: 6.25 TABLET, FILM COATED ORAL at 17:29

## 2025-03-17 RX ADMIN — NICOTINE TRANSDERMAL SYSTEM 21 MG: 21 PATCH, EXTENDED RELEASE TRANSDERMAL at 05:15

## 2025-03-17 RX ADMIN — HEPARIN SODIUM 5000 UNITS: 5000 INJECTION, SOLUTION INTRAVENOUS; SUBCUTANEOUS at 14:00

## 2025-03-17 RX ADMIN — HEPARIN SODIUM 5000 UNITS: 5000 INJECTION, SOLUTION INTRAVENOUS; SUBCUTANEOUS at 05:15

## 2025-03-17 RX ADMIN — ATORVASTATIN CALCIUM 40 MG: 40 TABLET, FILM COATED ORAL at 17:29

## 2025-03-17 RX ADMIN — ASPIRIN 81 MG: 81 TABLET, CHEWABLE ORAL at 05:16

## 2025-03-17 RX ADMIN — HYDRALAZINE HYDROCHLORIDE 20 MG: 20 INJECTION, SOLUTION INTRAMUSCULAR; INTRAVENOUS at 03:37

## 2025-03-17 RX ADMIN — CARVEDILOL 6.25 MG: 6.25 TABLET, FILM COATED ORAL at 08:33

## 2025-03-17 RX ADMIN — AMLODIPINE BESYLATE 5 MG: 5 TABLET ORAL at 05:16

## 2025-03-17 ASSESSMENT — ENCOUNTER SYMPTOMS
WEAKNESS: 0
FEVER: 0
GASTROINTESTINAL NEGATIVE: 1
SHORTNESS OF BREATH: 0
EYES NEGATIVE: 1
CONSTIPATION: 0
CHILLS: 0
MYALGIAS: 0
DIARRHEA: 0
VOMITING: 0
NAUSEA: 0

## 2025-03-17 ASSESSMENT — COGNITIVE AND FUNCTIONAL STATUS - GENERAL
HELP NEEDED FOR BATHING: A LITTLE
SUGGESTED CMS G CODE MODIFIER DAILY ACTIVITY: CK
DAILY ACTIVITIY SCORE: 19
STANDING UP FROM CHAIR USING ARMS: A LITTLE
DRESSING REGULAR UPPER BODY CLOTHING: A LITTLE
DRESSING REGULAR LOWER BODY CLOTHING: A LITTLE
CLIMB 3 TO 5 STEPS WITH RAILING: A LOT
MOBILITY SCORE: 19
PERSONAL GROOMING: A LITTLE
SUGGESTED CMS G CODE MODIFIER MOBILITY: CK
MOVING TO AND FROM BED TO CHAIR: A LITTLE
TOILETING: A LITTLE
WALKING IN HOSPITAL ROOM: A LITTLE

## 2025-03-17 ASSESSMENT — PAIN DESCRIPTION - PAIN TYPE: TYPE: ACUTE PAIN

## 2025-03-17 ASSESSMENT — FIBROSIS 4 INDEX: FIB4 SCORE: 5.15

## 2025-03-17 NOTE — CARE PLAN
The patient is Stable - Low risk of patient condition declining or worsening    Shift Goals  Clinical Goals: MRI, renal ultrasound  Patient Goals: sleep  Family Goals: updates    Progress made toward(s) clinical / shift goals:        Problem: Optimal Care of the Stroke Patient  Goal: Optimal emergency care for the stroke patient  Outcome: Progressing  Note: Target End Date: End of day 1Time of Onset1. Time of last known well obtained2. Patient and family/caregiver verbalize understanding of diagnosis, medications and testing3. NIHSS performed and documented, including date and time, for ischemic stroke patients prior to any acute recanalization therapy (thrombolytics or mechanical) or within 12 hours of arrival if no intervention is warranted4. Consults and referrals placed to appropriate departmentsMedications Administration as Ordered:1. Implement appropriate reversal agents for INR greater than 1.52. Pre-alteplase administration of antihypertensives for SBP >185 DBP >1103. Post-alteplase administration of antihypertensives for SBP >185, DBP >1054. Thrombolytic Therapy for qualifying ischemic stroke patients who arrive within 4.5 hours of time of Last Known Well. Thrombolytic therapy administered within 30 minutes or a documented reason for delay      Problem: Knowledge Deficit - Stroke Education  Goal: Patient's knowledge of stroke and risk factors will improve  Outcome: Progressing  Note: Target End Date: 1-3 days or as soon as patient condition allowsDocument in Patient Education1. Stroke education booklet provided2. Education regarding EMS activation, need for follow up, medication prescribed at discharge, risk factors for stroke/lifestyle modifications, warning signs and symptoms of stroke provided        Patient is not progressing towards the following goals:

## 2025-03-17 NOTE — DISCHARGE PLANNING
[facility] Transitional Care Coordination    Referral from: Dr. Velasco    Insurance Provider on Facesheet: [employer].  Unfortunately, [facility] is not a benefit.  Recommend a referral to [facility] for post acute services.     Potential Rehab Diagnosis: CVA    Chart review indicates patient may have on going medical management and may have therapy needs to possibly meet inpatient rehab facility criteria with the goal of returning to community.    D/C support will need to be verified: Son    Physiatry consultation denied per protocol. [facility] will no longer follow.  Please reach out to myself if a PMR consult referral is needed for medical management or with any questions.      Thank you for the referral.

## 2025-03-17 NOTE — HOSPITAL COURSE
Ms. Coty Valdivia is an 79-year-old female with history of smoking and hypertension who presented 3/15 with right-sided weakness, confusion, and chest pain.       Patient lives with her son, patient is poor historian and not able to provide a good history, patient noticed weakness on the left arm and leg 4 days before the admission with a chest pain, pressure chest pain, does not radiate, no loss of consciousness or other symptoms.  On admission patient was found to have elevated blood pressure 190/94, according to her son patient's taking her medication regularly at home?  EKG did not show any ischemia and troponin 22 and 21 with , creatinine was 1.4 and no history of CKD?  Patient will be admitted to the hospital for hypertensive emergency and possible stroke.  Patient admitted to hospital medicine for management of care.     During this hospitalization, we will pursue an MRI brain, cardiac stress test, echocardiogram, ultrasound renal, and PT/OT.  Urine analysis negative for any signs of infectious process.  CT head and neck negative for any acute findings.  Will wait for the results for plan of care.

## 2025-03-17 NOTE — DISCHARGE PLANNING
DPA sent rehab referral to [facility] jacob [facility] CM request.     [phone] Lior with [facility] LM for DPA requesting more information.   DPA informed [facility] CM with number to call back.
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
## 2025-03-17 NOTE — CONSULTS
Neurology STROKE H&P  Neurohospitalist Service, [facility]    Referring Physician: Marion Ruiz M.D.    STROKE:   Chief Complaint   Patient presents with    Extremity Weakness     R sided weakness began x2 days ago, LKW Wedn, states Rx thinner but has not been taking, NIH with aphasia with slow response, L sided facial numbness, R sided arm weakness     Chest Pain     Chest pain began yesterday, mid chest pain 9/10, tachypnea, home O2 as needed, hx smoking       To obtain the most accurate data regarding the time called, and time patient seen, refer to the stroke run-sheet and chart.  For time of CT, refer to the radiology report. See A&P below for TPA Decision and door to needle time if and when applicable.    HPI: Coty Valdivia is a pleasant 79 y.o. right-handed female with past medical history significant for hypertension who is a current smoker who was admitted on 3/15/2025 for evaluation of right-sided weakness that started 3 days before admission.  Patient felt her right lower extremity weakness will go away on its own, however after he persisted she presented to emergency room 2 days ago.  She underwent a brain CT followed by CT angiogram of the head and neck with CT perfusion which were all unremarkable.  Yesterday evening she underwent a brain MRI which revealed patchy area of acute infarction in the left frontal parietal periventricular white matter with involvement of the thalamus.  Patient's symptom have significantly improved.  She was not on any blood thinner at home.    Review of systems: In addition to what is detailed in the HPI above, all other systems reviewed and are negative.    Past Medical History:    has no past medical history on file.    FHx:  family history is not on file.    SHx:   reports that she has been smoking cigarettes. She has never used smokeless tobacco. She reports that she does not currently use alcohol. She reports that she does not use  drugs.    Allergies:  Allergies   Allergen Reactions    Codeine Nausea     nausea    Penicillins Rash     Rash  > 60 years ago       Medications:    Current Facility-Administered Medications:     nicotine (Nicoderm) 21 MG/24HR 21 mg, 21 mg, Transdermal, Daily-0600, 21 mg at 03/17/25 0515 **AND** Nicotine Replacement Patient Education Materials, , , Once **AND** nicotine polacrilex (Nicorette) 2 MG piece 2 mg, 2 mg, Oral, Q HOUR PRN, ROXANA Wynn    heparin injection 5,000 Units, 5,000 Units, Subcutaneous, Q8HRS, Cristel Velasco M.D., 5,000 Units at 03/17/25 0515    acetaminophen (Tylenol) tablet 650 mg, 650 mg, Oral, Q6HRS PRN, Cristel Velasco M.D.    labetalol (Normodyne/Trandate) injection 20 mg, 20 mg, Intravenous, Q4HRS PRN, Cristel Velasco M.D.    aspirin (Asa) chewable tab 81 mg, 81 mg, Oral, DAILY, 81 mg at 03/17/25 0516 **OR** aspirin (Asa) suppository 300 mg, 300 mg, Rectal, DAILY, Cristel Velasco M.D.    amLODIPine (Norvasc) tablet 5 mg, 5 mg, Oral, Q DAY, Cristel Velasco M.D., 5 mg at 03/17/25 0516    carvedilol (Coreg) tablet 6.25 mg, 6.25 mg, Oral, BID WITH MEALS, Cristel Velasco M.D., 6.25 mg at 03/17/25 0833    atorvastatin (Lipitor) tablet 40 mg, 40 mg, Oral, Q EVENING, Cristel Velasco M.D., 40 mg at 03/16/25 1730    hydrALAZINE (Apresoline) injection 20 mg, 20 mg, Intravenous, Q6HRS PRN, Cristel Velasco M.D., 20 mg at 03/17/25 0337    tiotropium (Spiriva Respimat) 2.5 mcg/Act inhalation spray 5 mcg, 5 mcg, Inhalation, QDAILY (RT), Solaiman Algharbi, M.D., 5 mcg at 03/17/25 0800    ipratropium-albuterol (DUONEB) nebulizer solution, 3 mL, Nebulization, Q4H PRN (RT), Cristel Velasco M.D.    Physical Examination:    Vitals:    03/17/25 0418 03/17/25 0516 03/17/25 0809 03/17/25 1247   BP:  117/44 134/57 134/58   Pulse:   63 60   Resp:   16 16   Temp:   36.3 °C (97.3 °F) 36.2 °C (97.2 °F)   TempSrc:   Temporal Temporal   SpO2:   95% 95%   Weight: 41.1 kg  (90 lb 9.7 oz)      Height:           General:   Patient is awake and in no acute distress  Neck: Full range of motion  Eyes: Midline, Pupils reactive to light.  CV: RRR  Lungs: No respiratory distress  Extremities: No cyanosis, warm, no significant edema.  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    NEUROLOGICAL EXAM:   Mental status: Awake, alert and fully oriented, follows commands  Speech and language: speech is fluent and not dysarthric. The patient is able to name and repeat.  Cranial nerve exam: Pupils are equal, round and reactive to light bilaterally. Visual fields are full. Extraocular muscles are intact. Sensation in the face is intact to light touch. Face is symmetric. Hearing to finger rub equal. Palate elevates symmetrically. Shoulder shrug is full. Tongue is midline.  Motor exam: Sustain antigravity in all 4 extremities with mild downward drift of right upper extremity. Tone is normal. No abnormal movements were seen on exam.  Sensory exam: No sensory deficits identified   Coordination: no gross ataxia noted on exam  Plantar reflexes: Equivocal  Gait: deferred    NIH Stroke Scale:    1a. Level of Consciousness (Alert, drowsy, etc): 0= Alert    1b. LOC Questions (Month, age): 0= Answers both correctly    1c. LOC Commands (Open/close eyes make fist/let go): 0= Obeys both correctly    2.   Best Gaze (Eyes open - patient follows examiner's finger on face): 0= Normal    3.   Visual Fields (introduce visual stimulus/threat to patient's field quadrants): 0= No visual loss  4.   Facial Paresis (Show teeth, raise eyebrows and squeeze eyes shut): 0= Normal     5a. Motor Arm - Left (Elevate arm to 90 degrees if patient is sitting, 45 degrees if  supine): 0= No drift    5b. Motor Arm - Right (Elevate arm to 90 degrees if patient is sitting, 45 degrees if supine): 1= Drift    6a. Motor Leg - Left (Elevate leg 30 degrees with patient supine): 0= No drift    6b. Motor Leg - Right  (Elevate  leg 30 degrees with patient supine): 0= No drift    7.   Limb Ataxia (Finger-nose, heel down shin): 0= No ataxia    8.   Sensory (Pin prick to face, arm, trunk and leg - compare side to side): 0= Normal    9.  Best Language (Name item, describe a picture and read sentences): 0= No aphasia    10. Dysarthria (Evaluate speech clarity by patient repeating listed words): 0= Normal articulation    11. Extinction and Inattention (Use information from prior testing to identify neglect or  double simultaneous stimuli testing): 0= No neglect    Total NIH Score: 1    Baseline modified Dee Scale (MRS): 0 = No symptoms    Objective Data:    Labs:  Lab Results   Component Value Date/Time    PROTHROMBTM 12.9 03/15/2025 10:44 AM    INR 0.97 03/15/2025 10:44 AM      Lab Results   Component Value Date/Time    WBC 6.5 03/17/2025 04:17 AM    RBC 4.49 03/17/2025 04:17 AM    HEMOGLOBIN 14.4 03/17/2025 04:17 AM    HEMATOCRIT 40.8 03/17/2025 04:17 AM    MCV 90.9 03/17/2025 04:17 AM    MCH 32.1 03/17/2025 04:17 AM    MCHC 35.3 03/17/2025 04:17 AM    MPV 10.9 03/17/2025 04:17 AM    NEUTSPOLYS 53.60 03/15/2025 10:44 AM    LYMPHOCYTES 36.70 03/15/2025 10:44 AM    MONOCYTES 7.00 03/15/2025 10:44 AM    EOSINOPHILS 1.70 03/15/2025 10:44 AM    BASOPHILS 0.80 03/15/2025 10:44 AM      Lab Results   Component Value Date/Time    SODIUM 137 03/17/2025 04:17 AM    POTASSIUM 3.6 03/17/2025 04:17 AM    CHLORIDE 106 03/17/2025 04:17 AM    CO2 20 03/17/2025 04:17 AM    GLUCOSE 92 03/17/2025 04:17 AM    BUN 28 (H) 03/17/2025 04:17 AM    CREATININE 1.53 (H) 03/17/2025 04:17 AM      Lab Results   Component Value Date/Time    CHOLSTRLTOT 227 (H) 03/16/2025 12:11 AM     (H) 03/16/2025 12:11 AM    HDL 63 03/16/2025 12:11 AM    TRIGLYCERIDE 178 (H) 03/16/2025 12:11 AM       Lab Results   Component Value Date/Time    ALKPHOSPHAT 60 03/15/2025 10:44 AM    ASTSGOT 22 03/15/2025 10:44 AM    ALTSGPT <5 03/15/2025 10:44 AM    TBILIRUBIN 0.7 03/15/2025 10:44 AM       Lab Results   Component Value Date/Time    HBA1C 5.4 03/15/2025 10:44 AM       Imaging/Testing:    I interpreted and/or reviewed the patient's neuroimaging    US-RENAL   Final Result         1.  Bilateral renal cysts.   2.  Mildly atrophic echogenic bilateral kidneys, appearance suggesting medical renal disease.      MR-BRAIN-W/O   Final Result         1. Age-related cerebral atrophy.      2. Extensive periventricular, juxtacortical, and pontine white matter changes consistent with chronic microvascular ischemic gliosis.      3. Patchy area of acute infarction in the left frontal parietal periventricular white matter extending inferiorly into the left posterior lateral thalamus.      4. Several chronic areas of lacunar-type infarction noted in the left-sided basal ganglia.      EC-ECHOCARDIOGRAM COMPLETE W/O CONT   Final Result      NM-CARDIAC STRESS TEST   Final Result      CT-CTA NECK WITH & W/O-POST PROCESSING   Final Result      CT angiogram of the neck within normal limits.      CT-CTA HEAD WITH & W/O-POST PROCESS   Final Result      CT angiogram of the [place] within normal limits.      CT-CEREBRAL PERFUSION ANALYSIS   Final Result      1. Cerebral blood flow less than 30% possibly representing completed infarct = 0 mL. Based on distribution of this finding, this is unlikely to represent artifact.      2. T Max more than 6 seconds possibly representing combination of completed infarct and ischemia = 0 mL. Based on the distribution of this finding, this is unlikely to represent artifact.      3. Mismatched volume possibly representing ischemic brain/penumbra= 0 mL      4.  Please note that this cerebral perfusion study and report is Quantitative and targets supratentorial (cerebral) perfusion for evaluation of large vessel territory acute ischemia/infarction. For example, lacunar infarcts, and brainstem/posterior fossa    ischemia/infarction are not evaluated on this study.  Data acquisition is  "subject to artifacts which can yield non-anatomically plausible perfusion maps which may be due to motion, bolus timing, signal to noise ratio, or other technical factors.    Perfusion map abnormalities which show non-anatomic distributions are likely artifact.   This study is not \"stand-alone\" and should only be utilized for diagnosis, management/treatment in correlation with CT, CTA, and/or MRI and clinical factors.         DX-CHEST-PORTABLE (1 VIEW)   Final Result         1. Mild coarse interstitial lung markings but no confluent infiltrate or effusion.                         Assessment:  Coty Valdivia is a pleasant 79 y.o. right-handed female with past medical history significant for hypertension who is a current smoker who was admitted on 3/15/2025 for evaluation of right-sided weakness that started 3 days before admission.   She underwent a brain CT followed by CT angiogram of the head and neck with CT perfusion which were all unremarkable.  Yesterday evening she underwent a brain MRI which revealed patchy area of acute infarction in the left frontal parietal periventricular white matter with involvement of the thalamus.  Patient's symptom have significantly improved.  She was not on any blood thinner at home.  Her stroke workup has been completed.  LDL is 128, hemoglobin A1c is 5.4.  Echocardiogram with normal ejection fraction and normal left atrial size.      Plan:  -q4h and PRN neuro assessment. VS per nursing/unit protocol.   -BP goal is normotension, 100-130/60-80. Antihypertensives per primary team.   -Telemetry; currently SR. Screen for Afib/arrhythmia.   -ASA 81 mg PO q day   - Atorvastatin 40 mg PO q HS.  -Recommend aggressive BG management per primary team. Avoid IVF with Dextrose. -BG goal .   Goal < 7  -PT/OT/SLP eval and treat for early mobilization.  -Counseled patient at length regarding life style and risk factor modification for secondary stroke prevention in particular smoking " cessation..   -All other medical management per primary team.   -Follow-up with stroke clinic after discharge.  -Neurology will sign off, please call us if there is any question.     The plan of care above has been discussed with Ian Barrera MD      Please note that this dictation was created using voice recognition software. I have made every reasonable attempt to correct obvious errors, but I expect that there are errors of grammar and possibly content that I did not discover before finalizing the note.       Elza Anderson MD  Acute Care Neurology Services

## 2025-03-17 NOTE — CARE PLAN
The patient is Stable - Low risk of patient condition declining or worsening    Shift Goals  Clinical Goals: MRI, Renal MRI,  Patient Goals: eat  Family Goals: NIMA    Progress made toward(s) clinical / shift goals:        Problem: Optimal Care of the Stroke Patient  Goal: Optimal emergency care for the stroke patient  Outcome: Progressing     Problem: Knowledge Deficit - Stroke Education  Goal: Patient's knowledge of stroke and risk factors will improve  Outcome: Progressing  Note: Educated pt on POC. All questions and concerns answered at this moment.        Problem: Fall Risk  Goal: Patient will remain free from falls  Outcome: Progressing  Note: Pt scored  high when assessed for fall risk. Bed is locked and in lowest position. Pt belongings and call light are within reach. Pt calls appropriately.

## 2025-03-17 NOTE — PROGRESS NOTES
[facility] Internal Medicine Daily Progress Note    Date of Service  3/17/2025    R Team: R IM Blue Team   Attending: Marion Ruiz M.d.  Senior Resident: Dr. Zapata  Intern:  Dr. Ian Barrera  Contact Number: [phone]    Chief Complaint  Coty Valdivia is a 79 y.o. female admitted 3/15/2025 with   Chief Complaint   Patient presents with    Extremity Weakness     R sided weakness began x2 days ago, LKW Wedn, states Rx thinner but has not been taking, NIH with aphasia with slow response, L sided facial numbness, R sided arm weakness     Chest Pain     Chest pain began yesterday, mid chest pain 9/10, tachypnea, home O2 as needed, hx smoking         Hospital Course  Ms. Coty Valdivia is an 79-year-old female with history of smoking and hypertension who presented 3/15 with right-sided weakness, confusion, and chest pain.      Patient lives with her son, patient is poor historian and not able to provide a good history, patient noticed weakness on the left arm and leg 4 days before the admission with a chest pain, pressure chest pain, does not radiate, no loss of consciousness or other symptoms.  On admission patient was found to have elevated blood pressure 190/94, according to her son patient's taking her medication regularly at home?  EKG did not show any ischemia and troponin 22 and 21 with , creatinine was 1.4 and no history of CKD?  Patient will be admitted to the hospital for hypertensive emergency and possible stroke.  Patient admitted to hospital medicine for management of care.    During this hospitalization, we will pursue an MRI brain, cardiac stress test, echocardiogram, ultrasound renal, and PT/OT.  Urine analysis negative for any signs of infectious process.  CT head and neck negative for any acute findings.  Will wait for the results for plan of care.    Interval Problem Update  -Patient continues to be somnolent today.  Will not answer many of my questions.  She is however oriented to place  "(hospital), year, name.  She did become more alert later in the day during rounds.  And participated fully with the exam.  She confirmed again that she does not have history of kidney disease.  -Reports no current weakness.  However on my exam right upper extremity weakness is evident at the deltoids and triceps.  -MRI showing patchy area of acute infarction in the left frontal parietal periventricular white matter extending inferiorly into the left posterior and lateral thalamus.  Also noting chronic ischemic changes and chronic lacunar infarct.  -Vascular neuro was consulted for new stroke finding  -Palliative care was also consulted for goals of care discussions given patient continues to be full code even after extensive discussions with our team. Patient expresses hesitation and says \"I do not care\". However, wants to stay alive so she can finish the quilt she is making for her son.  -Discussed with patient's son at bedside and the patient herself about possible postacute rehab that was recommended by PT/OT.  We also discussed the CODE STATUS.  They decided to talk amongst themselves and report back to us what they want to decide.  Patient will likely be going to subacute rehab upon discharge.  We will continue to follow.    I have discussed this patient's plan of care and discharge plan at IDT rounds today with Case Management, Nursing, Nursing leadership, and other members of the IDT team.    Consultants/Specialty  neurology     Code Status  Full Code    Disposition  The patient is medically cleared for discharge to home or a post-acute facility.  Anticipate discharge to: skilled nursing facility    I have placed the appropriate orders for post-discharge needs.    Review of Systems  Review of Systems   Constitutional:  Negative for chills and fever.   HENT: Negative.     Eyes: Negative.    Respiratory:  Negative for shortness of breath.    Cardiovascular:  Negative for chest pain.   Gastrointestinal: " Negative.  Negative for constipation, diarrhea, nausea and vomiting.   Genitourinary: Negative.    Musculoskeletal:  Negative for myalgias.   Skin: Negative.    Neurological:  Negative for weakness.        Physical Exam  Temp:  [36.2 °C (97.2 °F)-36.5 °C (97.7 °F)] 36.2 °C (97.2 °F)  Pulse:  [52-63] 60  Resp:  [15-16] 16  BP: (117-197)/(44-75) 134/58  SpO2:  [94 %-96 %] 95 %    Physical Exam  Vitals and nursing note reviewed.   HENT:      Head: Normocephalic.      Nose: Nose normal.      Mouth/Throat:      Mouth: Mucous membranes are moist.      Pharynx: Oropharynx is clear.   Eyes:      Pupils: Pupils are equal, round, and reactive to light.   Cardiovascular:      Rate and Rhythm: Normal rate and regular rhythm.      Pulses: Normal pulses.   Pulmonary:      Effort: Pulmonary effort is normal.      Breath sounds: Normal breath sounds.   Abdominal:      General: Bowel sounds are normal.      Palpations: Abdomen is soft.   Musculoskeletal:      Cervical back: Normal range of motion and neck supple.   Skin:     General: Skin is dry.      Capillary Refill: Capillary refill takes 2 to 3 seconds.   Neurological:      Mental Status: She is alert. Mental status is at baseline.      Motor: Weakness present.      Comments: RUE weakness deltoids and triceps         Fluids    Intake/Output Summary (Last 24 hours) at 3/17/2025 1451  Last data filed at 3/17/2025 1248  Gross per 24 hour   Intake 880 ml   Output 200 ml   Net 680 ml       Laboratory  Recent Labs     03/15/25  1044 03/16/25  0011 03/17/25 0417   WBC 5.3 7.0 6.5   RBC 4.68 4.47 4.49   HEMOGLOBIN 14.9 14.5 14.4   HEMATOCRIT 43.9 41.4 40.8   MCV 93.8 92.6 90.9   MCH 31.8 32.4 32.1   MCHC 33.9 35.0 35.3   RDW 45.7 45.8 44.7   PLATELETCT 166 161* 164   MPV 10.7 10.7 10.9     Recent Labs     03/15/25  1044 03/16/25  0011 03/17/25  0417   SODIUM 141 139 137   POTASSIUM 3.9 3.9 3.6   CHLORIDE 108 106 106   CO2 20 20 20   GLUCOSE 87 102* 92   BUN 27* 26* 28*   CREATININE  1.48* 1.43* 1.53*   CALCIUM 9.5 9.2 9.1     Recent Labs     03/15/25  1044   APTT 29.5   INR 0.97         Recent Labs     03/16/25  0011   TRIGLYCERIDE 178*   HDL 63   *       Imaging  US-RENAL   Final Result         1.  Bilateral renal cysts.   2.  Mildly atrophic echogenic bilateral kidneys, appearance suggesting medical renal disease.      MR-BRAIN-W/O   Final Result         1. Age-related cerebral atrophy.      2. Extensive periventricular, juxtacortical, and pontine white matter changes consistent with chronic microvascular ischemic gliosis.      3. Patchy area of acute infarction in the left frontal parietal periventricular white matter extending inferiorly into the left posterior lateral thalamus.      4. Several chronic areas of lacunar-type infarction noted in the left-sided basal ganglia.      EC-ECHOCARDIOGRAM COMPLETE W/O CONT   Final Result      NM-CARDIAC STRESS TEST   Final Result      CT-CTA NECK WITH & W/O-POST PROCESSING   Final Result      CT angiogram of the neck within normal limits.      CT-CTA HEAD WITH & W/O-POST PROCESS   Final Result      CT angiogram of the [place] within normal limits.      CT-CEREBRAL PERFUSION ANALYSIS   Final Result      1. Cerebral blood flow less than 30% possibly representing completed infarct = 0 mL. Based on distribution of this finding, this is unlikely to represent artifact.      2. T Max more than 6 seconds possibly representing combination of completed infarct and ischemia = 0 mL. Based on the distribution of this finding, this is unlikely to represent artifact.      3. Mismatched volume possibly representing ischemic brain/penumbra= 0 mL      4.  Please note that this cerebral perfusion study and report is Quantitative and targets supratentorial (cerebral) perfusion for evaluation of large vessel territory acute ischemia/infarction. For example, lacunar infarcts, and brainstem/posterior fossa    ischemia/infarction are not evaluated on this  "study.  Data acquisition is subject to artifacts which can yield non-anatomically plausible perfusion maps which may be due to motion, bolus timing, signal to noise ratio, or other technical factors.    Perfusion map abnormalities which show non-anatomic distributions are likely artifact.   This study is not \"stand-alone\" and should only be utilized for diagnosis, management/treatment in correlation with CT, CTA, and/or MRI and clinical factors.         DX-CHEST-PORTABLE (1 VIEW)   Final Result         1. Mild coarse interstitial lung markings but no confluent infiltrate or effusion.                          Assessment/Plan  Problem Representation:    * Hypertensive emergency- (present on admission)  Assessment & Plan  Came with the blood pressure more than 200/100 and chest pain with signs of stroke  -CTA for head and neck did not show any stenosis  -MRI showing patchy area of acute infarction in the left frontal parietal periventricular white matter extending inferiorly into the left posterior and lateral thalamus.  Also noting chronic ischemic changes and chronic lacunar infarct.  -Echo  -Continue with amlodipine and carvedilol  -Labetalol IV as needed    ACP (advance care planning)  Assessment & Plan  -Palliative care was consulted for goals of care discussions given patient continues to be full code even after extensive discussions with our team. Patient expresses hesitation and says \"I do not care\". However, wants to stay alive so she can finish the quilt she is making for her son.  -Discussed with patient's son at bedside and the patient herself about possible postacute rehab that was recommended by PT/OT.  We also discussed the CODE STATUS.  They decided to talk amongst themselves and report back to us what they want to decide.  Patient will likely be going to subacute rehab upon discharge.  We will continue to follow.    Right sided weakness  Assessment & Plan  Patient is on high risk for stroke  Mild " weakness and discoordination on the right side  CT scan did not show any acute finding and no bleeding also CTA with no stenosis for neck and head  MRI showing patchy area of acute infarction in the left frontal parietal periventricular white matter extending inferiorly into the left posterior and lateral thalamus.  Also noting chronic ischemic changes and chronic lacunar infarct.  -Aspirin and atorvastatin  -Telemetry    Malnutrition (HCC)  Assessment & Plan  With history of smoking and weight loss  Patient is on high risk for malignancy  Needs malignancy screening as outpatient    ERROL (acute kidney injury) (HCC)  Assessment & Plan  Creatinine 1.4, unknown her baseline  Order ultrasound  Likely related to uncontrolled hypertension  Check A1c  Amlodipine and carvedilol    Smoking  Assessment & Plan  Patient recounseled and reeducated on smoking cessation    Patient needs cancer screening with CT scan for chest    Chest pain  Assessment & Plan  The ASCVD Risk score (Monalisa ARAUJO, et al., 2019) failed to calculate for the following reasons:    The 2019 ASCVD risk score is only valid for ages 40 to 79  Patient is on high risk for coronary artery disease  Her chest pain possibly related to hypertensive emergency  -EKG did not show ischemia and troponin around 21  -Continue telemetry   -TTE EF 62%; mild LVH; moderate to severe Aortic insufficiency  -Cardiac stress test no reversible defects  -Aspirin and atorvastatin  -Encouraged the patient to quit smoking         VTE prophylaxis: SCDs/TEDs and heparin ppx    I have performed a physical exam and reviewed and updated ROS and Plan today (3/17/2025). In review of yesterday's note (3/16/2025), there are no changes except as documented above.

## 2025-03-18 PROBLEM — E78.2 MIXED HYPERLIPIDEMIA: Status: ACTIVE | Noted: 2025-03-18

## 2025-03-18 LAB
ALBUMIN SERPL BCP-MCNC: 3.6 G/DL (ref 3.2–4.9)
APPEARANCE UR: ABNORMAL
BACTERIA #/AREA URNS HPF: ABNORMAL /HPF
BASOPHILS # BLD AUTO: 0.5 % (ref 0–1.8)
BASOPHILS # BLD: 0.03 K/UL (ref 0–0.12)
BILIRUB UR QL STRIP.AUTO: NEGATIVE
BUN SERPL-MCNC: 40 MG/DL (ref 8–22)
CALCIUM ALBUM COR SERPL-MCNC: 9.2 MG/DL (ref 8.5–10.5)
CALCIUM SERPL-MCNC: 8.9 MG/DL (ref 8.5–10.5)
CASTS URNS QL MICRO: ABNORMAL /LPF (ref 0–2)
CHLORIDE SERPL-SCNC: 107 MMOL/L (ref 96–112)
CK SERPL-CCNC: 73 U/L (ref 0–154)
CO2 SERPL-SCNC: 19 MMOL/L (ref 20–33)
COLOR UR: ABNORMAL
CREAT SERPL-MCNC: 1.82 MG/DL (ref 0.5–1.4)
CREAT UR-MCNC: 158 MG/DL
CREAT UR-MCNC: 160 MG/DL
EOSINOPHIL # BLD AUTO: 0.09 K/UL (ref 0–0.51)
EOSINOPHIL NFR BLD: 1.6 % (ref 0–6.9)
EPITHELIAL CELLS 1715: ABNORMAL /HPF (ref 0–5)
ERYTHROCYTE [DISTWIDTH] IN BLOOD BY AUTOMATED COUNT: 45.6 FL (ref 35.9–50)
GFR SERPLBLD CREATININE-BSD FMLA CKD-EPI: 28 ML/MIN/1.73 M 2
GLUCOSE SERPL-MCNC: 107 MG/DL (ref 65–99)
GLUCOSE UR STRIP.AUTO-MCNC: NEGATIVE MG/DL
HCT VFR BLD AUTO: 37.3 % (ref 37–47)
HGB BLD-MCNC: 12.8 G/DL (ref 12–16)
IMM GRANULOCYTES # BLD AUTO: 0.02 K/UL (ref 0–0.11)
IMM GRANULOCYTES NFR BLD AUTO: 0.4 % (ref 0–0.9)
KETONES UR STRIP.AUTO-MCNC: ABNORMAL MG/DL
LEUKOCYTE ESTERASE UR QL STRIP.AUTO: ABNORMAL
LYMPHOCYTES # BLD AUTO: 1.92 K/UL (ref 1–4.8)
LYMPHOCYTES NFR BLD: 35.1 % (ref 22–41)
MAGNESIUM SERPL-MCNC: 1.9 MG/DL (ref 1.5–2.5)
MCH RBC QN AUTO: 31.8 PG (ref 27–33)
MCHC RBC AUTO-ENTMCNC: 34.3 G/DL (ref 32.2–35.5)
MCV RBC AUTO: 92.8 FL (ref 81.4–97.8)
MICRO URNS: ABNORMAL
MONOCYTES # BLD AUTO: 0.37 K/UL (ref 0–0.85)
MONOCYTES NFR BLD AUTO: 6.8 % (ref 0–13.4)
NEUTROPHILS # BLD AUTO: 3.04 K/UL (ref 1.82–7.42)
NEUTROPHILS NFR BLD: 55.6 % (ref 44–72)
NITRITE UR QL STRIP.AUTO: NEGATIVE
NRBC # BLD AUTO: 0 K/UL
NRBC BLD-RTO: 0 /100 WBC (ref 0–0.2)
PH UR STRIP.AUTO: 5.5 [PH] (ref 5–8)
PHOSPHATE SERPL-MCNC: 3.3 MG/DL (ref 2.5–4.5)
PLATELET # BLD AUTO: 161 K/UL (ref 164–446)
PMV BLD AUTO: 10.9 FL (ref 9–12.9)
POTASSIUM SERPL-SCNC: 4 MMOL/L (ref 3.6–5.5)
PROT UR QL STRIP: 100 MG/DL
PROT UR-MCNC: 34.2 MG/DL (ref 0–15)
PROT/CREAT UR: 214 MG/G (ref 10–107)
RBC # BLD AUTO: 4.02 M/UL (ref 4.2–5.4)
RBC # URNS HPF: ABNORMAL /HPF (ref 0–2)
RBC UR QL AUTO: ABNORMAL
SODIUM SERPL-SCNC: 137 MMOL/L (ref 135–145)
SODIUM UR-SCNC: 96 MMOL/L
SP GR UR STRIP.AUTO: 1.03
UROBILINOGEN UR STRIP.AUTO-MCNC: 1 EU/DL
WBC # BLD AUTO: 5.5 K/UL (ref 4.8–10.8)
WBC #/AREA URNS HPF: ABNORMAL /HPF

## 2025-03-18 PROCEDURE — 81001 URINALYSIS AUTO W/SCOPE: CPT

## 2025-03-18 PROCEDURE — 97116 GAIT TRAINING THERAPY: CPT | Mod: CQ

## 2025-03-18 PROCEDURE — 92526 ORAL FUNCTION THERAPY: CPT

## 2025-03-18 PROCEDURE — 80069 RENAL FUNCTION PANEL: CPT

## 2025-03-18 PROCEDURE — 84156 ASSAY OF PROTEIN URINE: CPT

## 2025-03-18 PROCEDURE — 770001 HCHG ROOM/CARE - MED/SURG/GYN PRIV*

## 2025-03-18 PROCEDURE — 36415 COLL VENOUS BLD VENIPUNCTURE: CPT

## 2025-03-18 PROCEDURE — 92523 SPEECH SOUND LANG COMPREHEN: CPT

## 2025-03-18 PROCEDURE — 99999 PR NO CHARGE: CPT | Performed by: NURSE PRACTITIONER

## 2025-03-18 PROCEDURE — 700102 HCHG RX REV CODE 250 W/ 637 OVERRIDE(OP): Performed by: NURSE PRACTITIONER

## 2025-03-18 PROCEDURE — 84300 ASSAY OF URINE SODIUM: CPT

## 2025-03-18 PROCEDURE — 85025 COMPLETE CBC W/AUTO DIFF WBC: CPT

## 2025-03-18 PROCEDURE — A9270 NON-COVERED ITEM OR SERVICE: HCPCS | Performed by: INTERNAL MEDICINE

## 2025-03-18 PROCEDURE — 97530 THERAPEUTIC ACTIVITIES: CPT | Mod: CQ

## 2025-03-18 PROCEDURE — 82570 ASSAY OF URINE CREATININE: CPT

## 2025-03-18 PROCEDURE — 83735 ASSAY OF MAGNESIUM: CPT

## 2025-03-18 PROCEDURE — 99232 SBSQ HOSP IP/OBS MODERATE 35: CPT | Performed by: STUDENT IN AN ORGANIZED HEALTH CARE EDUCATION/TRAINING PROGRAM

## 2025-03-18 PROCEDURE — 700111 HCHG RX REV CODE 636 W/ 250 OVERRIDE (IP): Performed by: INTERNAL MEDICINE

## 2025-03-18 PROCEDURE — 700105 HCHG RX REV CODE 258

## 2025-03-18 PROCEDURE — 700102 HCHG RX REV CODE 250 W/ 637 OVERRIDE(OP): Performed by: INTERNAL MEDICINE

## 2025-03-18 PROCEDURE — 82550 ASSAY OF CK (CPK): CPT

## 2025-03-18 PROCEDURE — A9270 NON-COVERED ITEM OR SERVICE: HCPCS | Performed by: NURSE PRACTITIONER

## 2025-03-18 RX ORDER — SODIUM CHLORIDE, SODIUM LACTATE, POTASSIUM CHLORIDE, CALCIUM CHLORIDE 600; 310; 30; 20 MG/100ML; MG/100ML; MG/100ML; MG/100ML
INJECTION, SOLUTION INTRAVENOUS CONTINUOUS
Status: ACTIVE | OUTPATIENT
Start: 2025-03-18 | End: 2025-03-19

## 2025-03-18 RX ORDER — ENALAPRILAT 1.25 MG/ML
1.25 INJECTION INTRAVENOUS EVERY 6 HOURS PRN
Status: DISCONTINUED | OUTPATIENT
Start: 2025-03-18 | End: 2025-03-18

## 2025-03-18 RX ORDER — LABETALOL HYDROCHLORIDE 5 MG/ML
20 INJECTION, SOLUTION INTRAVENOUS EVERY 4 HOURS PRN
Status: DISCONTINUED | OUTPATIENT
Start: 2025-03-18 | End: 2025-03-20 | Stop reason: HOSPADM

## 2025-03-18 RX ADMIN — HEPARIN SODIUM 5000 UNITS: 5000 INJECTION, SOLUTION INTRAVENOUS; SUBCUTANEOUS at 13:21

## 2025-03-18 RX ADMIN — HEPARIN SODIUM 5000 UNITS: 5000 INJECTION, SOLUTION INTRAVENOUS; SUBCUTANEOUS at 04:15

## 2025-03-18 RX ADMIN — ATORVASTATIN CALCIUM 40 MG: 40 TABLET, FILM COATED ORAL at 16:08

## 2025-03-18 RX ADMIN — SODIUM CHLORIDE, POTASSIUM CHLORIDE, SODIUM LACTATE AND CALCIUM CHLORIDE: 600; 310; 30; 20 INJECTION, SOLUTION INTRAVENOUS at 18:10

## 2025-03-18 RX ADMIN — NICOTINE TRANSDERMAL SYSTEM 21 MG: 21 PATCH, EXTENDED RELEASE TRANSDERMAL at 04:15

## 2025-03-18 RX ADMIN — HEPARIN SODIUM 5000 UNITS: 5000 INJECTION, SOLUTION INTRAVENOUS; SUBCUTANEOUS at 21:47

## 2025-03-18 RX ADMIN — ASPIRIN 81 MG: 81 TABLET, CHEWABLE ORAL at 04:15

## 2025-03-18 RX ADMIN — AMLODIPINE BESYLATE 5 MG: 5 TABLET ORAL at 04:16

## 2025-03-18 RX ADMIN — CARVEDILOL 6.25 MG: 6.25 TABLET, FILM COATED ORAL at 10:24

## 2025-03-18 RX ADMIN — CARVEDILOL 6.25 MG: 6.25 TABLET, FILM COATED ORAL at 16:07

## 2025-03-18 ASSESSMENT — ENCOUNTER SYMPTOMS
NAUSEA: 0
SHORTNESS OF BREATH: 0
CONSTIPATION: 0
MYALGIAS: 0
EYES NEGATIVE: 1
DIARRHEA: 0
VOMITING: 0
CHILLS: 0
GASTROINTESTINAL NEGATIVE: 1
FEVER: 0
WEAKNESS: 0

## 2025-03-18 ASSESSMENT — COGNITIVE AND FUNCTIONAL STATUS - GENERAL
SUGGESTED CMS G CODE MODIFIER MOBILITY: CK
WALKING IN HOSPITAL ROOM: A LITTLE
STANDING UP FROM CHAIR USING ARMS: A LITTLE
MOBILITY SCORE: 19
CLIMB 3 TO 5 STEPS WITH RAILING: A LOT
MOVING TO AND FROM BED TO CHAIR: A LITTLE

## 2025-03-18 ASSESSMENT — GAIT ASSESSMENTS
ASSISTIVE DEVICE: FRONT WHEEL WALKER
DISTANCE (FEET): 60
GAIT LEVEL OF ASSIST: CONTACT GUARD ASSIST
DEVIATION: BRADYKINETIC;DECREASED TOE OFF;DECREASED HEEL STRIKE;DECREASED BASE OF SUPPORT

## 2025-03-18 ASSESSMENT — FIBROSIS 4 INDEX: FIB4 SCORE: 5.15

## 2025-03-18 ASSESSMENT — PAIN DESCRIPTION - PAIN TYPE: TYPE: ACUTE PAIN

## 2025-03-18 NOTE — PROGRESS NOTES
Cardiac rhythm: 54-62 bpm  Rate range (shift):     Ectopy: PACs  Frequency: rare     GA: 0.17  QRS: 0.07  QT:  0.41

## 2025-03-18 NOTE — PROGRESS NOTES
Monitor summary  Rhythm:  SB-SR  Ectopy: PAC  HR:  51-63  .17/.07/.41  Per strip from monitor room

## 2025-03-18 NOTE — DISCHARGE PLANNING
Case Management Discharge Planning    Admission Date: 3/15/2025  GMLOS: 2.8  ALOS: 1    6-Clicks ADL Score: 19  6-Clicks Mobility Score: 19      Anticipated Discharge Dispo: Discharge Disposition: Disch to IP rehab facility or distinct part unit (62)    DME Needed: No    Action(s) Taken: Updated Provider/Nurse on Discharge Plan  @0946 Met with patient to discuss SNF and IPR.   Patient lives in a mobile home on [address], with 3-4 steps entry.   She lives with her son. Son works during the day and is not home.     @1015 Contacted [facility] and provided the above information. Barbara will pull the referral and review it.     @1100 Discussed in IDT. Patient to be medically cleared later today or tomorrow.     Escalations Completed: None    Medically Clear: No    Next Steps: follow for IPR acceptance vs SNF    Barriers to Discharge: Medical clearance and Pending Placement    Is the patient up for discharge tomorrow: No   - will arrange transport when DC destination is certain

## 2025-03-18 NOTE — CONSULTS
"PALLIATIVE CARE SOCIAL WORK CONSULT NOTE    Patient: Coty Valdivia  Age: 79  Gender: Female  MRN: [id number]  Insurance: Prominence  Date Admitted: 3/15/25  Date of Service: 3/18/25    HPI  Per provider note dated 3/17/25, \"Ms. Coty Valdivia is an 79-year-old female with history of smoking and hypertension who presented 3/15 with right-sided weakness, confusion, and chest pain.       Patient lives with her son, patient is poor historian and not able to provide a good history, patient noticed weakness on the left arm and leg 4 days before the admission with a chest pain, pressure chest pain, does not radiate, no loss of consciousness or other symptoms.  On admission patient was found to have elevated blood pressure 190/94, according to her son patient's taking her medication regularly at home?  EKG did not show any ischemia and troponin 22 and 21 with , creatinine was 1.4 and no history of CKD?  Patient will be admitted to the hospital for hypertensive emergency and possible stroke.  Patient admitted to hospital medicine for management of care.     During this hospitalization, we will pursue an MRI brain, cardiac stress test, echocardiogram, ultrasound renal, and PT/OT.  Urine analysis negative for any signs of infectious process.  CT head and neck negative for any acute findings.  Will wait for the results for plan of care.     Interval Problem Update  -Patient continues to be somnolent today.  Will not answer many of my questions.  She is however oriented to place (hospital), year, name.  She did become more alert later in the day during rounds.  And participated fully with the exam.  She confirmed again that she does not have history of kidney disease.  -Reports no current weakness.  However on my exam right upper extremity weakness is evident at the deltoids and triceps.  -MRI showing patchy area of acute infarction in the left frontal parietal periventricular white matter extending inferiorly into the " "left posterior and lateral thalamus.  Also noting chronic ischemic changes and chronic lacunar infarct.  -Vascular neuro was consulted for new stroke finding  -Palliative care was also consulted for goals of care discussions given patient continues to be full code even after extensive discussions with our team. Patient expresses hesitation and says \"I do not care\". However, wants to stay alive so she can finish the quilt she is making for her son.  -Discussed with patient's son at bedside and the patient herself about possible postacute rehab that was recommended by PT/OT.  We also discussed the CODE STATUS.  They decided to talk amongst themselves and report back to us what they want to decide.  Patient will likely be going to subacute rehab upon discharge.  We will continue to follow.\"    Palliative care was consulted for GOC/ACP.    3/18/25: Patient was sitting in bed. She reports she is feeling much better today. She has a good appetite and reports minimal pain in lower back.     ACP  LMSW met with patient. She appeared A+Ox4. There were no family at bedside. LMSW introduced self and explained role of palliative care. Patient was agreeable to discuss her healthcare wishes/advance care planning. Patient lives with son, Dominic. She has been independent with ADL's and most IADL's. Patient's independence is very important to her and she does not like to burden her family. She comes a long line of strong, independent women. She denies recent falls or use of DME. Patient has a good understanding of current medical situation. \"I had high blood pressure, not as bad as my mom though, and I have been weak\".     Family System/Social Situation: Patient is . She has two biological children(Dominic and Darren) and two step children(Josefina and Shar). She lives with son, Dominic, who helps when needed. She is not close to other children. \"We know we love each other but we don't talk regularly\".     Importance of " Spirituality/Ly: Patient is spiritual. She does not go to a Christian. She uses positive thinking and calls to the [employer] to help her.    Morgan: Patient is not a .    CODE STATUS: Full-We discussed burden versus benefits of resuscitative efforts and concern that patient would likely have a decreased overall quality of life were she to survive a resuscitation attempt. She would like to continue with current code status at this time. LMSW explained if changes her mind or wants to explore further, she could follow up with PCP outpatient. Patient states understanding.     POLST: N/A    ADVANCE DIRECTIVE: LMSW explained purpose of advance directive and patient said she has not completed one. LMSW explained next of kin order(spouse, majority of adult children, parents, majority of siblings) if no document is completed. She would like to have Dominic as her medical decision maker as he is most involved in her care but she would like to discuss with him first. LMSW left advance directive packet for patient to review. LMSW to follow up with patient at a later time and will assist with advance directive completion, if needed.     GOALS OF CARE  We explored patient's goals. She wants to finish a quilt she is making for her son. She is hoping to get stronger and is agreeable to rehab/SNF placement with the ultimate goal to get back home.     LMSW updated care team.     FOLLOW UP: Palliative care to follow patient as needed.    37 minutes spent discussing advance care planning, this time excludes any other billed services.     Milagros Bowden LMSW  Palliative Care

## 2025-03-18 NOTE — THERAPY
"Speech Language Pathology   Cognitive Evaluation     Patient Name: Coty Valdivia  AGE:  79 y.o., SEX:  female  Medical Record #: [id number]  Date of Service: 3/18/2025      History of Present Illness  79F admitted to [facility] 3/15 for hypertensive emergency presenting w/ right-sided weakness and chest pain.    CMHx: Stroke, HTN emergency, chest pain, right-sided weakness, ERROL, smoking, malnutrition    PMHx: COPD w/ acute exacerbation, benign neoplasm of descending colon, diverticulosis of large intenstine, gangrene, osteoarthritis of knee and hip    Imaging:  MRI Brain 3/16:  \"1. Age-related cerebral atrophy.     2. Extensive periventricular, juxtacortical, and pontine white matter changes consistent with chronic microvascular ischemic gliosis.     3. Patchy area of acute infarction in the left frontal parietal periventricular white matter extending inferiorly into the left posterior lateral thalamus.     4. Several chronic areas of lacunar-type infarction noted in the left-sided basal ganglia.\"    General Information  Vitals  Pulse: 60  Respiration: 18  Pulse Oximetry: 94 %  O2 Delivery Device: None - Room Air  Vitals Comments: Pt complaint of headache and dizziness. RN aware.  Level of Consciousness: Awake, Alert  Patient Behaviors: Forgetful (Inconsistently cooperative to SLP tasks)  Orientation: Oriented x 4  Follows Directives: Yes    Prior Living Situation & Level of Function  Prior Services: Intermittent Physical Support for ADL Per Family  Housing / Facility: Mobile Home  Lives with - Patient's Self Care Capacity: Adult Children  Comments: Pt currently resides with her son who works outside of the home at variable times. Able to assist while at home  Communication: WFL per pt     Subjective  Pt was seen at bedside. She was inconsistently agreeable and cooperative to SLP tasks. Pt stated she would prefer for her food to be heated up to continue ST. Resumed ST once tray was warmed.    Communication " Domain(s)  Expressive Language: Mild  Receptive Language: WFL  Cognitive-Linguistic: Moderate  Reading: WFL    Assessment  The patient was seen this date for a cognitive evaluation.    Cognistat  Orientation: Average  Attention: Moderate  Comprehension: Average  Repetition: Mild  Naming: Average  Memory: Average  Calculations: Average  Similarities: Mild  Judgement: Average    Medication Management: Pt demonstrated excellent reading fluency and good reading comprehension at the multi-sentence level during medication management task. Appropriate synthesis of information. Answered temporal and numerical reasoning tasks with 100% accuracy. Good problem solving demonstrated when asked for a memory enhancing strategy. Pt did report taking routine medications at home; described home medication management somewhat appropriately.     Clinical Impressions  Pt is recommended to participate in skilled, therapeutic intervention for cognition, given above performance on standardized and informal measures. Pt presents w/ a mild-moderate cognitive impairment at this time. Pt reports more difficulty w/ memory and longer processing time this admission than at OF. She stated she would benefit from attention/memory and med mgt intervention in hospital. Pt would benefit from ST in OP after d/c.    Relative strengths: orientation, comprehension, naming, memory, calculation, and judgment  Relative weaknesses: attention, repetition, similarities    Pt will require intermittent IADLs at discharge. Pt reports adult son helps w/ IADLs prior to this admission.    NOTE: It is not within the scope of practice of Speech-Language Pathologists to determine patient capacity. Please defer to the physician or psych to complete this assessment.     Recommendations  Intermittent assistance with IADLs (see below)  IADLs: Medication management, Financial management, Appointment management, Household chores, Cooking (Pt reports adult son helps with  "IADLs)  Consult Referral(s): Gastroenterologist (can be seen at [facility])    SLP Treatment Plan  Cognitive Treatment, Patient/Family/Caregiver Training  SLP Frequency: 3x Per Week  Estimated Duration: Until Therapy Goals Met    Anticipated Discharge Needs  Discharge Recommendations: Recommend outpatient speech therapy services  Therapy Recommendations Upon DC: Cognitive-Linguistic Training, Community Re-Integration, Patient / Family / Caregiver Education    Patient / Family Goal #1: \"I need my coffee.\"  Goal #1 Outcome: Goal met  Short Term Goal # 1: Pt will consume least restrictive solids / thin liquids with no worseing of respiratory status or overt signs concerning for airway invasion.  Goal Outcome # 1: Goal met  Short Term Goal # 2: Pt will recall at least 4/5 of her daily medications, using learned memory strategies w/ 80% accuracy and MIN clinician support  Short Term Goal # 3: Pt will follow a written medication schedule using a visual support w/ 80% accuracy and MIN clinician support      Ino Bueno, Student  "

## 2025-03-18 NOTE — CARE PLAN
The patient is Stable - Low risk of patient condition declining or worsening    Shift Goals  Clinical Goals: Q 4 neuro checks, manage MP, monitor VS  Patient Goals: rest  Family Goals: NIMA    Progress made toward(s) clinical / shift goals:    Problem: Neuro Status  Goal: Neuro status will remain stable or improve  Description: Target End Date:  Prior to discharge or change in level of careDocument on Neuro assessment in the Assessment flowsheet1.  Assess and monitor neurologic status per provider order/protocol/unit policy2.  Assess level of consciousness and orientation3.  Assess for speech, dysarthria, dysphagia, facial symmetry4.  Assess visual field, eye movements, gaze preference, pupil reaction and size5.  Assess muscle strength and motor response in all four extremities6.  Assess for sensation (numbness and tingling)7.  Assess basic neuro reflexes (cough, gag, corneal)8.  Identify changes in neuro status and report to provider for testing/treatment orders  Outcome: Progressing  Note: Neurostatus constant, moves all 4 extremities, facial symmetry, disoriented to day of month during evening/night time.      Problem: Venous Thromboembolism (VTW)/Deep Vein Thrombosis (DVT) Prevention:  Goal: Patient will participate in Venous Thrombosis (VTE)/Deep Vein Thrombosis (DVT)Prevention Measures  Outcome: Progressing  Note: Pt on unfractionated heparin SQ. SCDs applied, pt tolerating well.

## 2025-03-18 NOTE — THERAPY
"Physical Therapy   Daily Treatment     Patient Name: Coty Valdivia  Age:  79 y.o., Sex:  female  Medical Record #: [id number]  Today's Date: 3/18/2025     Precautions  Precautions: Fall Risk    Assessment    Pt greeted and seen for PT treatment. Pt has questionable insight. She ambulated 60ft w/ FWW and CGA, one minor LOB that she self corrected. Pt had not been using walker at home because she has house plants on it, despite reporting she often needs to drag her R LE.   Pt currently limited by impaired balance, decreased strength and decreased activity tolerance which negatively impacts functional mobility. Pt will continue to benefit from skilled PT to address deficits.       Plan    Treatment Plan Status: Continue Current Treatment Plan  Type of Treatment: Bed Mobility, Gait Training, Neuro Re-Education / Balance, Self Care / Home Evaluation, Stair Training, Therapeutic Activities, Therapeutic Exercise  Treatment Frequency: 4 Times per Week  Treatment Duration: Until Therapy Goals Met    DC Equipment Recommendations: Unable to determine at this time  Discharge Recommendations: Recommend post-acute placement for additional physical therapy services prior to discharge home      Subjective  \"I bet that'd be easier if I'd use my walker\"       03/18/25 1527   Pain 0 - 10 Group   Therapist Pain Assessment Post Activity Pain Same as Prior to Activity;Nurse Notified  (no c/o pain)   Cognition    Level of Consciousness Alert   Comments Pleasant and cooperative   Balance   Sitting Balance (Static) Fair +   Sitting Balance (Dynamic) Fair   Standing Balance (Static) Fair -   Standing Balance (Dynamic) Fair -   Weight Shift Sitting Fair   Weight Shift Standing Fair   Skilled Intervention Verbal Cuing;Sequencing;Compensatory Strategies   Comments w/ FWW   Bed Mobility    Supine to Sit Supervised   Scooting Supervised   Rolling Supervised   Skilled Intervention Verbal Cuing   Comments HOB raised   Gait Analysis   Gait Level Of " Assist Contact Guard Assist   Assistive Device Front Wheel Walker   Distance (Feet) 60   # of Times Distance was Traveled 1   Deviation Bradykinetic;Decreased Toe Off;Decreased Heel Strike;Decreased Base Of Support   Skilled Intervention Verbal Cuing;Tactile Cuing;Sequencing;Facilitation;Compensatory Strategies   Comments Pt reports for the last 2-3 months at home she often needs to lean against the wall and pull on her R pant leg to advance her R LE when walking. She states she has not been using her walker because her house plants are on it. She reports when she feels like she can't walk any further by dragging her leg she calls out for her son, he works during the day. Today she demo'd a very narrow ALAN and decreased step height and clearance w/ the R LE. She did have one LOB that she self corrected.   Functional Mobility   Sit to Stand Contact Guard Assist   Bed, Chair, Wheelchair Transfer Contact Guard Assist   Transfer Method Stand Step   Mobility bed>babcock>chair   Skilled Intervention Verbal Cuing;Sequencing;Compensatory Strategies;Tactile Cuing   Comments VC to remain with walker through chair transfer   Short Term Goals    Short Term Goal # 1 Pt will be able to perform STS with FWW and PSV in 6 visits to progress functional transfers   Goal Outcome # 1 Progressing as expected   Short Term Goal # 2 Pt will be able to ambulate 50ft with FWW and SPV to improve functional gait   Goal Outcome # 2 Progressing as expected   Short Term Goal # 3 Pt will be able to ascend/descend 4 stairs with CGA in 6 visits in order to safely navigate her home   Goal Outcome # 3 Goal not met   Supervising Physical Therapist (PTA Treatments Only)   Supervising Physical Therapist Adilene Loza

## 2025-03-18 NOTE — THERAPY
Speech Language Pathology   Daily Treatment     Patient Name: Coty Valdivia  AGE:  79 y.o., SEX:  female  Medical Record #: [id number]  Date of Service: 3/18/2025    Precautions: Fall Risk, Swallow Precautions     Subjective  Pt was seen at bedside for ST. She was received [facility] w/ lunch tray. Pt was alert and inconsistently cooperative to SLP tasks.    Assessment  PO trials presented of TN0, PU4, SB6, and EC7 this session. No overt s/sx of respiratory invasion or respiratory distress consistent across PO trials. Complete stripping of bolus from spoon and complete oral containment. Effective mastication w/ min-no oral residue appreciated. Denied odynophagia and globus in throat or chest this session and during each meal recently. Education provided on s/sx of dysphagia and to notify staff should sx present.    Clinical Impressions  Skilled, therapeutic intervention for dysphagia is not indicated at this time, given no overt s/sx of respiratory invasion or respiratory distress this date and as reported from pt and nursing. Recommended to continue soft and bite-sized solids / thin liquids w/ distant spv and meds administered whole in liquid, as GARY. Please re-consult SLP should s/sx of dysphagia present.    Recommendations  Dysphagia Treatment  Consult Referral(s): Gastroenterologist (can be seen in [facility])    Dysphagia Treatment  Diet Consistency: Soft and bite-sized / thin liquids  Instrumentation: None indicated at this time  Medication: One pill at a time, Cut large pills, Whole with liquid  Supervision: Distant supervision - check on patient 2-3 times per meal  Positioning: Fully upright and midline during oral intake, Remain upright for 30 minutes after oral intake, Meals sitting upright in a chair, as tolerated  Risk Management : Small bites/sips, Alternate bites and sips, Slow rate of intake, Physical mobility, as tolerated  Oral Care: BID    SLP Treatment Plan  None Indicated  SLP Frequency: 3x Per  "Week  Estimated Duration: Until Therapy Goals Met    Anticipated Discharge Needs  Discharge Recommendations: Recommend outpatient speech therapy services  Therapy Recommendations Upon DC: Cognitive-Linguistic Training, Community Re-Integration, Patient / Family / Caregiver Education    Patient / Family Goal #1: \"I need my coffee.\"  Goal #1 Outcome: Goal met  Short Term Goals  Short Term Goal # 1: Pt will consume least restrictive solids / thin liquids with no worseing of respiratory status or overt signs concerning for airway invasion.  Goal Outcome # 1: Goal met      Ino Bueno, Student  "

## 2025-03-18 NOTE — DISCHARGE PLANNING
1300  Agency/Facility Name: [facility]  Outcome: DPA LVM inquiring follow up on pending Rehab referral.  DPA to try again soon.     1509  Agency/Facility Name: [facility]  DPA attempted to follow up on pending Rehab referral.  DPA to try again.

## 2025-03-18 NOTE — CARE PLAN
The patient is Stable - Low risk of patient condition declining or worsening    Shift Goals  Clinical Goals: Q4 neuro checks  Patient Goals: comfort  Family Goals: regis    Progress made toward(s) clinical / shift goals:    Problem: Knowledge Deficit - Stroke Education  Goal: Patient's knowledge of stroke and risk factors will improve  Outcome: Progressing  Note: Patient received stroke education. Stroke education book with patient.     Problem: Neuro Status  Goal: Neuro status will remain stable or improve  Outcome: Progressing  Note: Q4 neuro assessments performed throughout shift.     Problem: Self Care  Goal: Patient will have the ability to perform ADLs independently or with assistance (bathe, groom, dress, toilet and feed)  Outcome: Progressing  Note: Patient able to eat and brush teeth on own.

## 2025-03-19 PROBLEM — I16.1 HYPERTENSIVE EMERGENCY: Status: RESOLVED | Noted: 2025-03-15 | Resolved: 2025-03-19

## 2025-03-19 PROBLEM — I63.9 ACUTE ISCHEMIC STROKE (HCC): Status: RESOLVED | Noted: 2025-03-17 | Resolved: 2025-03-19

## 2025-03-19 PROBLEM — R07.9 CHEST PAIN: Status: RESOLVED | Noted: 2025-03-15 | Resolved: 2025-03-19

## 2025-03-19 PROBLEM — E44.0 MODERATE MALNUTRITION (HCC): Status: ACTIVE | Noted: 2025-03-15

## 2025-03-19 PROBLEM — N17.9 AKI (ACUTE KIDNEY INJURY) (HCC): Status: RESOLVED | Noted: 2025-03-15 | Resolved: 2025-03-19

## 2025-03-19 PROBLEM — Z71.89 ACP (ADVANCE CARE PLANNING): Status: RESOLVED | Noted: 2025-03-15 | Resolved: 2025-03-19

## 2025-03-19 LAB
ALBUMIN SERPL BCP-MCNC: 3.4 G/DL (ref 3.2–4.9)
BASOPHILS # BLD AUTO: 0.9 % (ref 0–1.8)
BASOPHILS # BLD: 0.05 K/UL (ref 0–0.12)
BUN SERPL-MCNC: 40 MG/DL (ref 8–22)
CALCIUM ALBUM COR SERPL-MCNC: 9.3 MG/DL (ref 8.5–10.5)
CALCIUM SERPL-MCNC: 8.8 MG/DL (ref 8.5–10.5)
CHLORIDE SERPL-SCNC: 109 MMOL/L (ref 96–112)
CO2 SERPL-SCNC: 20 MMOL/L (ref 20–33)
CREAT SERPL-MCNC: 1.62 MG/DL (ref 0.5–1.4)
EOSINOPHIL # BLD AUTO: 0.14 K/UL (ref 0–0.51)
EOSINOPHIL NFR BLD: 2.4 % (ref 0–6.9)
ERYTHROCYTE [DISTWIDTH] IN BLOOD BY AUTOMATED COUNT: 47.3 FL (ref 35.9–50)
GFR SERPLBLD CREATININE-BSD FMLA CKD-EPI: 32 ML/MIN/1.73 M 2
GLUCOSE SERPL-MCNC: 96 MG/DL (ref 65–99)
HCT VFR BLD AUTO: 36.6 % (ref 37–47)
HGB BLD-MCNC: 12.2 G/DL (ref 12–16)
IMM GRANULOCYTES # BLD AUTO: 0.03 K/UL (ref 0–0.11)
IMM GRANULOCYTES NFR BLD AUTO: 0.5 % (ref 0–0.9)
LYMPHOCYTES # BLD AUTO: 2.42 K/UL (ref 1–4.8)
LYMPHOCYTES NFR BLD: 41.7 % (ref 22–41)
MAGNESIUM SERPL-MCNC: 2 MG/DL (ref 1.5–2.5)
MCH RBC QN AUTO: 31.5 PG (ref 27–33)
MCHC RBC AUTO-ENTMCNC: 33.3 G/DL (ref 32.2–35.5)
MCV RBC AUTO: 94.6 FL (ref 81.4–97.8)
MONOCYTES # BLD AUTO: 0.43 K/UL (ref 0–0.85)
MONOCYTES NFR BLD AUTO: 7.4 % (ref 0–13.4)
NEUTROPHILS # BLD AUTO: 2.73 K/UL (ref 1.82–7.42)
NEUTROPHILS NFR BLD: 47.1 % (ref 44–72)
NRBC # BLD AUTO: 0 K/UL
NRBC BLD-RTO: 0 /100 WBC (ref 0–0.2)
PHOSPHATE SERPL-MCNC: 3.6 MG/DL (ref 2.5–4.5)
PLATELET # BLD AUTO: 150 K/UL (ref 164–446)
PMV BLD AUTO: 10.6 FL (ref 9–12.9)
POTASSIUM SERPL-SCNC: 4 MMOL/L (ref 3.6–5.5)
RBC # BLD AUTO: 3.87 M/UL (ref 4.2–5.4)
SODIUM SERPL-SCNC: 138 MMOL/L (ref 135–145)
WBC # BLD AUTO: 5.8 K/UL (ref 4.8–10.8)

## 2025-03-19 PROCEDURE — 700102 HCHG RX REV CODE 250 W/ 637 OVERRIDE(OP): Performed by: NURSE PRACTITIONER

## 2025-03-19 PROCEDURE — 700111 HCHG RX REV CODE 636 W/ 250 OVERRIDE (IP): Performed by: INTERNAL MEDICINE

## 2025-03-19 PROCEDURE — 770001 HCHG ROOM/CARE - MED/SURG/GYN PRIV*

## 2025-03-19 PROCEDURE — 700105 HCHG RX REV CODE 258: Performed by: STUDENT IN AN ORGANIZED HEALTH CARE EDUCATION/TRAINING PROGRAM

## 2025-03-19 PROCEDURE — 99406 BEHAV CHNG SMOKING 3-10 MIN: CPT

## 2025-03-19 PROCEDURE — 700102 HCHG RX REV CODE 250 W/ 637 OVERRIDE(OP): Performed by: INTERNAL MEDICINE

## 2025-03-19 PROCEDURE — A9270 NON-COVERED ITEM OR SERVICE: HCPCS | Performed by: INTERNAL MEDICINE

## 2025-03-19 PROCEDURE — 99232 SBSQ HOSP IP/OBS MODERATE 35: CPT | Mod: GC | Performed by: STUDENT IN AN ORGANIZED HEALTH CARE EDUCATION/TRAINING PROGRAM

## 2025-03-19 PROCEDURE — 83735 ASSAY OF MAGNESIUM: CPT

## 2025-03-19 PROCEDURE — 36415 COLL VENOUS BLD VENIPUNCTURE: CPT

## 2025-03-19 PROCEDURE — 700105 HCHG RX REV CODE 258

## 2025-03-19 PROCEDURE — 85025 COMPLETE CBC W/AUTO DIFF WBC: CPT

## 2025-03-19 PROCEDURE — A9270 NON-COVERED ITEM OR SERVICE: HCPCS | Performed by: NURSE PRACTITIONER

## 2025-03-19 PROCEDURE — 80069 RENAL FUNCTION PANEL: CPT

## 2025-03-19 RX ORDER — SODIUM CHLORIDE 9 MG/ML
INJECTION, SOLUTION INTRAVENOUS CONTINUOUS
Status: ACTIVE | OUTPATIENT
Start: 2025-03-19 | End: 2025-03-19

## 2025-03-19 RX ORDER — CARVEDILOL 6.25 MG/1
6.25 TABLET ORAL 2 TIMES DAILY WITH MEALS
Qty: 60 TABLET | Refills: 0 | Status: ON HOLD
Start: 2025-03-19 | End: 2025-04-18

## 2025-03-19 RX ORDER — AMLODIPINE BESYLATE 5 MG/1
10 TABLET ORAL DAILY
Qty: 60 TABLET | Refills: 0 | Status: ON HOLD
Start: 2025-03-20 | End: 2025-04-19

## 2025-03-19 RX ORDER — ASPIRIN 81 MG/1
81 TABLET ORAL DAILY
Qty: 30 TABLET | Refills: 0 | Status: ON HOLD
Start: 2025-03-19 | End: 2025-04-18

## 2025-03-19 RX ADMIN — CARVEDILOL 6.25 MG: 6.25 TABLET, FILM COATED ORAL at 08:49

## 2025-03-19 RX ADMIN — ATORVASTATIN CALCIUM 40 MG: 40 TABLET, FILM COATED ORAL at 16:11

## 2025-03-19 RX ADMIN — TIOTROPIUM BROMIDE INHALATION SPRAY 5 MCG: 3.12 SPRAY, METERED RESPIRATORY (INHALATION) at 08:49

## 2025-03-19 RX ADMIN — HEPARIN SODIUM 5000 UNITS: 5000 INJECTION, SOLUTION INTRAVENOUS; SUBCUTANEOUS at 13:34

## 2025-03-19 RX ADMIN — ASPIRIN 81 MG: 81 TABLET, CHEWABLE ORAL at 04:16

## 2025-03-19 RX ADMIN — NICOTINE TRANSDERMAL SYSTEM 21 MG: 21 PATCH, EXTENDED RELEASE TRANSDERMAL at 04:15

## 2025-03-19 RX ADMIN — AMLODIPINE BESYLATE 5 MG: 5 TABLET ORAL at 04:15

## 2025-03-19 RX ADMIN — SODIUM CHLORIDE, POTASSIUM CHLORIDE, SODIUM LACTATE AND CALCIUM CHLORIDE: 600; 310; 30; 20 INJECTION, SOLUTION INTRAVENOUS at 05:54

## 2025-03-19 RX ADMIN — CARVEDILOL 6.25 MG: 6.25 TABLET, FILM COATED ORAL at 16:11

## 2025-03-19 RX ADMIN — SODIUM CHLORIDE: 9 INJECTION, SOLUTION INTRAVENOUS at 12:05

## 2025-03-19 RX ADMIN — HEPARIN SODIUM 5000 UNITS: 5000 INJECTION, SOLUTION INTRAVENOUS; SUBCUTANEOUS at 21:24

## 2025-03-19 RX ADMIN — HEPARIN SODIUM 5000 UNITS: 5000 INJECTION, SOLUTION INTRAVENOUS; SUBCUTANEOUS at 04:16

## 2025-03-19 ASSESSMENT — ENCOUNTER SYMPTOMS
VOMITING: 0
MYALGIAS: 0
CONSTIPATION: 0
GASTROINTESTINAL NEGATIVE: 1
EYES NEGATIVE: 1
WEAKNESS: 0
FEVER: 0
DIARRHEA: 0
NAUSEA: 0
CHILLS: 0
SHORTNESS OF BREATH: 0

## 2025-03-19 ASSESSMENT — PAIN DESCRIPTION - PAIN TYPE: TYPE: ACUTE PAIN

## 2025-03-19 NOTE — ASSESSMENT & PLAN NOTE
Latest Reference Range & Units Most Recent   Cholesterol,Tot 100 - 199 mg/dL 227 (H)  3/16/25 00:11   Triglycerides 0 - 149 mg/dL 178 (H)  3/16/25 00:11   HDL >=40 mg/dL 63  3/16/25 00:11   LDL <100 mg/dL 128 (H)  3/16/25 00:11   (H): Data is abnormally high    - Atorvastatin 40 mg

## 2025-03-19 NOTE — DISCHARGE SUMMARY
[facility] Internal Medicine Discharge Summary    Attending: Vida Friedman M.d.  Senior Resident: Dr. Zapata  Intern:  Dr. Ian Barrera  Contact Number: [phone]    CHIEF COMPLAINT ON ADMISSION  Chief Complaint   Patient presents with    Extremity Weakness     R sided weakness began x2 days ago, LKW Wedn, states Rx thinner but has not been taking, NIH with aphasia with slow response, L sided facial numbness, R sided arm weakness     Chest Pain     Chest pain began yesterday, mid chest pain 9/10, tachypnea, home O2 as needed, hx smoking       Reason for Admission  One sided weakness     Admission Date  3/15/2025    CODE STATUS  Full Code    HPI & HOSPITAL COURSE  Coty Valdivia, an 79-year-old female with a history of smoking and hypertension, was admitted on March 15, 2025, due to right-sided weakness, confusion, and chest pain. On admission, she presented with elevated blood pressure and symptoms suggestive of a hypertensive emergency and possible stroke. Initial evaluations, including an EKG and troponin tests, indicated no ischemia, though elevated troponin levels were noted. MRI revealed a patchy area of acute infarction in the left frontal parietal periventricular white matter and chronic ischemic changes. Neurology was consulted, and appropriate stroke management was initiated, including aspirin and atorvastatin, alongside PT/OT/SLP interventions.    During her hospital stay, Ms. Valdivia's management focused on controlling her blood pressure with amlodipine, carvedilol, and labetalol as needed. Her chest pain was attributed to hypertensive emergency, and telemetry monitoring was continued. Despite her medical issues, she remained hemodynamically stable with no new concerns and was medically cleared for discharge to a skilled nursing facility for further rehabilitation.    Her course was complicated by acute kidney injury, likely secondary to uncontrolled hypertension, with suspicion of ATN. However, CKD  cannot be ruled out as there are non records of prior serum creatinine.. Conservative fluid management and renal adjustment of medications were implemented. The patient was educated on smoking cessation. PT/OT/SLP evaluated patient. Recommended subacute rehab and outpatient SLP.    Overall, Ms. Valdivia's hospitalization focused on managing her acute ischemic stroke, hypertensive emergency, and associated complications, preparing her for post-acute care in a skilled nursing facility.          Therefore, she is discharged in good and stable condition to home with close outpatient follow-up.    The patient met 2-midnight criteria for an inpatient stay at the time of discharge.    Discharge Date  03/20/25      Physical Exam on Day of Discharge  Physical Exam  Vitals and nursing note reviewed.   HENT:      Head: Normocephalic.      Nose: Nose normal.      Mouth/Throat:      Mouth: Mucous membranes are moist.      Pharynx: Oropharynx is clear.   Eyes:      Pupils: Pupils are equal, round, and reactive to light.   Cardiovascular:      Rate and Rhythm: Normal rate and regular rhythm.      Pulses: Normal pulses.   Pulmonary:      Effort: Pulmonary effort is normal.      Breath sounds: Normal breath sounds.   Abdominal:      General: Bowel sounds are normal.      Palpations: Abdomen is soft.   Musculoskeletal:      Cervical back: Normal range of motion and neck supple.   Skin:     General: Skin is dry.      Capillary Refill: Capillary refill takes 2 to 3 seconds.   Neurological:      Mental Status: She is alert. Mental status is at baseline.      Motor: Weakness present.      Comments: RUE weakness deltoids and triceps         FOLLOW UP ITEMS POST DISCHARGE  Neurology Stroke Clinic: Schedule a follow-up appointment with the stroke clinic to monitor recovery and adjust medications as needed.    Speech language pathology: Recommend outpatient speech therapy services. Intermittent assistance with IADLs: Medication management,  Financial management, Appointment management, Household chores, Cooking (Pt reports adult son helps with IADLs)    Primary care physician:  Blood Pressure Management: Regular monitoring and follow-up with her primary care physician to ensure blood pressure remains well-controlled.  Renal Function Monitoring: Follow up with PCP to monitor kidney function and adjust treatment for acute kidney injury and potential chronic kidney disease.  Smoking Cessation Support: Engage in a smoking cessation program to reduce cardiovascular risk.  Cancer Screening: Arrange for outpatient cancer screening, including a CT scan of the chest, given her smoking history and risk factors.    DISCHARGE DIAGNOSES  Principal Problem (Resolved):    Hypertensive emergency (POA: Yes)  Active Problems:    Smoking (POA: Yes)    ERROL (acute kidney injury) (HCC) (POA: Yes)    Malnutrition (HCC) (POA: Yes)    Right sided weakness (POA: Yes)    Mixed hyperlipidemia (POA: Unknown)  Resolved Problems:    Chest pain (POA: Unknown)    ACP (advance care planning) (POA: Yes)    Acute ischemic stroke (HCC) (POA: Yes)      FOLLOW UP  No future appointments.  No follow-up provider specified.    MEDICATIONS ON DISCHARGE     Medication List        START taking these medications        Instructions   amLODIPine 5 MG Tabs  Start taking on: March 20, 2025  Commonly known as: Norvasc   Take 2 Tablets by mouth every day for 30 days.  Dose: 10 mg     aspirin 81 MG EC tablet   Take 1 Tablet by mouth every day for 30 days.  Dose: 81 mg     carvedilol 6.25 MG Tabs  Commonly known as: Coreg   Take 1 Tablet by mouth 2 times a day with meals for 30 days.  Dose: 6.25 mg            CONTINUE taking these medications        Instructions   alendronate 10 MG Tabs  Commonly known as: Fosamax   Take 10 mg by mouth every morning before breakfast. Son does not know when last dose was taken  Dose: 10 mg     donepezil 5 MG Tabs  Commonly known as: Aricept   Take 10 mg by mouth every  evening. Son does not know when last dose was taken  Dose: 10 mg     ferrous sulfate 325 (65 Fe) MG tablet   Take 325 mg by mouth every day. Son does not know when last dose was taken  Dose: 325 mg     ipratropium-albuterol  MCG/ACT Aers  Commonly known as: Combivent Respimat   Inhale 1 Puff 4 times a day. Son does not know when last dose was taken  Dose: 1 Puff     omeprazole 20 MG delayed-release capsule  Commonly known as: PriLOSEC   Take 40 mg by mouth every day. Son does not know when last dose was taken  Dose: 40 mg     PARoxetine 20 MG Tabs  Commonly known as: Paxil   Take 40 mg by mouth every day. Son does not know when last dose was taken  Dose: 40 mg     simvastatin 20 MG Tabs  Commonly known as: Zocor   Take 20 mg by mouth every evening. Son does not know when last dose was taken  Dose: 20 mg            STOP taking these medications      clindamycin 150 MG Caps  Commonly known as: Cleocin     clopidogrel 75 MG Tabs  Commonly known as: Plavix              Allergies  Allergies   Allergen Reactions    Codeine Nausea     nausea    Penicillins Rash     Rash  > 60 years ago       DIET  Orders Placed This Encounter   Procedures    Diet Order Diet: Level 6 - Soft and Bite Sized; Liquid level: Level 0 - Thin     Standing Status:   Standing     Number of Occurrences:   1     Diet::   Level 6 - Soft and Bite Sized [23]     Liquid level:   Level 0 - Thin       ACTIVITY  As tolerated.  Weight bearing as tolerated    CONSULTATIONS  neurology    PROCEDURES  None    LABORATORY  Lab Results   Component Value Date    SODIUM 138 03/19/2025    POTASSIUM 4.0 03/19/2025    CHLORIDE 109 03/19/2025    CO2 20 03/19/2025    GLUCOSE 96 03/19/2025    BUN 40 (H) 03/19/2025    CREATININE 1.62 (H) 03/19/2025        Lab Results   Component Value Date    WBC 5.8 03/19/2025    HEMOGLOBIN 12.2 03/19/2025    HEMATOCRIT 36.6 (L) 03/19/2025    PLATELETCT 150 (L) 03/19/2025        Total time of the discharge process exceeds 61 minutes.

## 2025-03-19 NOTE — DISCHARGE PLANNING
DC Transport Scheduled    Transport Company Scheduled:  [employer]  Spoke with Coleman at [facility] to schedule transport.    Scheduled Date: 3/20/2025  Scheduled Time: 1100    Transport Type: [facility]  Destination:   Healthcare Facility   Destination Name: [facility]  Destination address: [address] [place] [address]    Notified care team of scheduled transport via [employer].     If there are any changes needed to the DC transportation scheduled, please contact [facility] at ext. [phone] between the hours of 4212-2647. If outside those hours, contact the ED Case Manager at ext. [phone].

## 2025-03-19 NOTE — CARE PLAN
The patient is Watcher - Medium risk of patient condition declining or worsening    Shift Goals  Clinical Goals: q4h neuro checks, monitor VS and I&O  Patient Goals: rest, eat  Family Goals: regis    Progress made toward(s) clinical / shift goals:      Problem: Optimal Care of the Stroke Patient  Goal: Optimal emergency care for the stroke patient  Description: Target End Date:  End of day 1Time of Onset1.  Time of last known well obtained2.  Patient and family/caregiver verbalize understanding of diagnosis, medications and testing3.  NIHSS performed and documented, including date and time, for ischemic stroke patients prior to any acute recanalization therapy (thrombolytics or mechanical) or within 12 hours of arrival if no intervention is warranted4.  Consults and referrals placed to appropriate departmentsMedications Administration as Ordered:1.  Implement appropriate reversal agents for INR greater than 1.52.  Pre-alteplase administration of antihypertensives for SBP >185 DBP >1103.  Post-alteplase administration of antihypertensives for SBP >185, DBP >1054.  Thrombolytic Therapy for qualifying ischemic stroke patients who arrive within 4.5 hours of time of Last Known Well. Thrombolytic therapy administered within 30 minutes or a documented reason for delay  Outcome: Progressing  Goal: Optimal acute care for the stroke patient  Description: Target End Date:  1 to 3 days- Vital signs and neuro checks performed and documented per order- NIHSS completed and documented per order- Continuous telemetry monitoring for 72 hours or until discontinued by provider- Head CT without contrast obtained- Consideration of MRI/MRA- MRI screening form completed in worklist if MRI ordered- Echocardiogram with Bubble Study ordered/completed with consideration of CARLOS- Carotid Doppler ordered/completed (Not required if CTA of neck completed in ED)- Lipid Panel obtained within 48 hours of admission- PT, PTT, INR obtained per  Anticoagulation orders (if applicable)- Antithrombotic therapy by end of hospital day 2 for ischemic stroke. Provider must document reason if contraindicated.- Venous Thromboembolism (VTE) Prophylaxis by end of hospital day 2 for ischemic and hemorrhagic stroke. Provider must document reason if contraindicated- Dysphagia screen completed and documented prior to any PO intake. Patient to remain NPO until Speech Therapy evaluation if thrombolytic or thrombectomy performed- Rehabilitation assessment including PT/OT/SLP evaluations for referral to Physical Medicine and Rehabilitation services. If none needed, provider needs to document reason- Neurology consult placed- Consideration of cardiology consult for cryptogenic strokes  Outcome: Progressing     Problem: Knowledge Deficit - Stroke Education  Goal: Patient's knowledge of stroke and risk factors will improve  Description: Target End Date:  1-3 days or as soon as patient condition allowsDocument in Patient Education1.  Stroke education booklet provided2.  Education regarding EMS activation, need for follow up, medication prescribed at discharge, risk factors for stroke/lifestyle modifications, warning signs and symptoms of stroke provided  Outcome: Progressing     Problem: Psychosocial - Patient Condition  Goal: Patient's ability to verbalize feelings about condition will improve  Description: Target End Date:  Prior to discharge or change in level of care1.  Discuss coping with medical condition and its effects2.  Encourage patient participation in care3.  Encourage acknowledgement of body changes and accompanying emotions4.  Perform depression screening  Outcome: Progressing  Goal: Patient's ability to re-evaluate and adapt role responsibilities will improve  Description: Target End Date:  Prior to discharge or change in level of care1.  Assess family support2.  Encourage support system participation in care3.  Encouraged verbalization of feelings regarding  caregiver responsibilities4.  Discuss changes in role and responsibilities caused by patient's condition  Outcome: Progressing     Problem: Discharge Planning - Stroke  Goal: Ensure Stroke Core Measures are met prior to discharge  Description: Target End Date:  Prior to discharge or change in level of care1. Patient discharged on antithrombotic therapy (Ischemic Stroke)2. Patient discharged on intensive statin if LDL is greater than or equal to 70 mg/dl (Ischemic Stroke)3. Patient discharged on anticoagulation therapy for patients with atrial fibrillation/flutter (Ischemic Stroke)4. Smoking education/cessation provided if applicable  Outcome: Progressing  Goal: Patient’s continuum of care needs will be met  Description: Target End Date:  Prior to discharge or change in level of care1.  Potential discharge barriers identified upon admission and throughout hospital stay2.  Ensure appropriate referrals in place for follow up with specialists after discharge3.  Ensure appropriate referrals in place for DMEs if applicable4.  Collaboration with transitional care team and interdisciplinary team to meet discharge needs5.  Involve patient and family/caregiver in prioritizing goals for discharge planning6.  Educate patient and caregiver about discharge instructions, medications, and follow up appointments7.  Referral placed to [facility]8.  Assure orders and follow up appointments are made for outpatient extended cardiac monitoring if appropriate  Outcome: Progressing     Problem: Neuro Status  Goal: Neuro status will remain stable or improve  Description: Target End Date:  Prior to discharge or change in level of careDocument on Neuro assessment in the Assessment flowsheet1.  Assess and monitor neurologic status per provider order/protocol/unit policy2.  Assess level of consciousness and orientation3.  Assess for speech, dysarthria, dysphagia, facial symmetry4.  Assess visual field, eye movements, gaze preference,  pupil reaction and size5.  Assess muscle strength and motor response in all four extremities6.  Assess for sensation (numbness and tingling)7.  Assess basic neuro reflexes (cough, gag, corneal)8.  Identify changes in neuro status and report to provider for testing/treatment orders  Outcome: Progressing     Problem: Hemodynamic Monitoring  Goal: Patient's hemodynamics, fluid balance and neurologic status will be stable or improve  Description: Target End Date:  Prior to discharge or change in level of care1.  Vital signs, pulse oximetry and cardiac monitor per provider order and/or policy2.  Frequent pulse checks performed post thrombectomy3.  Frequent monitoring for signs of bleeding post TPA administration4.  Proper management of IV infusions5.  Intake and output monitored per provider order6.  Daily weight obtained per unit policy or provider order7.  Peripheral pulses and capillary refill assessed as needed8.  Monitor for signs/symptoms of excessive bleeding9.  Body temperature assessed and fevers [id number]. Patient positioned for maximum circulation/cardiac output  Outcome: Progressing     Problem: Respiratory - Stroke Patient  Goal: Patient will achieve/maintain optimum respiratory rate/effort  Description: Target End Date:  Prior to discharge or change in level of careDocument on Assessment flowsheet1.  Assess and monitor respiratory rate, rhythm, depth and effort of respiration2.  Oxygenation assessed throughout shift (recommendation of >94% for new stroke patients)3.  Oxygen administered and/or titrated per order4.  Collaboration with RT to administer medication/treatments per order5.  Patient educated on importance of turning, coughing, and deep breathing6.  Patient positioned for maximum ventilatory efficiency7.  Airway suctioning provided as needed8.  Incentive spirometry encouraged 5-10 times every hour or while awake  Outcome: Progressing     Problem: Dysphagia  Goal: Dysphagia will  improve  Description: Target End Date:  Prior to discharge or change in level of care1.  Assess and monitor ability to swallow2.  Collaborate with Speech Therapy to determine appropriate adaptation for safe administration of medications and oral nutrition3.  Elevate head of bed to 90 degrees during feedings and for 30 minutes after each feeding4.  Encourage proper swallowing techniques5.  Screening on admission or as soon as possible  Outcome: Progressing     Problem: Risk for Aspiration  Goal: Patient's risk for aspiration will be absent or decrease  Description: Target End Date:  Prior to discharge or change in level of care1.   Complete dysphagia screening on admission2.   NPO until dysphagia screening complete or medically cleared3.   Collaborate with Speech Therapy, Clinical Dietitian and interdisciplinary team4.   Implement aspiration precautions5.   Assist patient up to chair for meals6.   Elevate head of bed 90 degrees if patient is unable to get out of bed7.   Encourage small bites8.   Ensure foods/liquids are of appropriate consistency9.   Assess for any signs/symptoms of [id number]. Assess breath sounds and vital signs after oral intake  Outcome: Progressing     Problem: Urinary Elimination  Goal: Establish and maintain regular urinary output  Description: Target End Date:  Prior to discharge or change in level of careDocument on I/O and Assessment flowsheets1.  Evaluate need to continue indwelling catheter every shift2.  Assess signs and symptoms of urinary retention3.  Assess post-void residual volumes4.  Implement bladder training program5.  Encourage scheduled voidings6.  Assist patient to sit on bedside commode or toilet for voiding7.  Educate patient and family/caregiver on use and purpose of urine collection devices (document in Patient Education)  Outcome: Progressing     Problem: Mobility - Stroke  Goal: Patient's capacity to carry out activities will improve  Description: Target End Date:   Prior to discharge or change in level of care1.  Assess for barriers to mobility/activity2.  Implement activity per interdisciplinary team recommendations3.  Target activity level identified and patient/family/caregiver aware of goal4.  Provide assistive devices5.  Instruct patient/caregiver on proper use of assistive/adaptive devices6.  Schedule activities and rest periods to decrease effects of fatigue7.  Encourage mobilization to extent of ability8.  Maintain proper body alignment9.  Provide adequate pain management to allow progressive [id number]. Implement pace maker precautions as needed  Outcome: Progressing  Goal: Spasticity will be prevented or improved  Description: Target End Date:  Prior to discharge or change in level of care1.  Muscle relaxing agents considered or administered per order2.  Splints applied properly and used accordingly to therapist's recommendations3.  Assistance with stretching and range of motion exercises provided  Outcome: Progressing  Goal: Subluxation will be prevented or improved  Description: Target End Date:  Prior to discharge or change in level of care1.   Ensure proper handling during transfers, ambulation, and repositioning in bed2.   Reduce traction to affected limb and provide adequate support of weight3.   Perform passive range of motion4.  Assist with active range of motion  Outcome: Progressing     Problem: Self Care  Goal: Patient will have the ability to perform ADLs independently or with assistance (bathe, groom, dress, toilet and feed)  Description: Target End Date:  Prior to discharge or change in level of careDocument on ADL flowsheet1.  Assess the capability and level of deficiency to perform ADLs2.  Encourage family/care giver involvement3.  Provide assistive devices4.  Consider PT/OT evaluations5.  Maintain support, give positive feedback, encourage self-care allowing extra time and verbal cuing as needed6.  Avoid doing something for patients they can do  themselves, but provide assistance as needed7.  Assist in anticipating/planning individual needs8.  Collaborate with Case Management and  to meet discharge needs  Outcome: Progressing     Problem: Knowledge Deficit - Standard  Goal: Patient and family/care givers will demonstrate understanding of plan of care, disease process/condition, diagnostic tests and medications  Description: Target End Date:  1-3 days or as soon as patient condition allowsDocument in Patient Education1.  Patient and family/caregiver oriented to unit, equipment, visitation policy and means for communicating concern2.  Complete/review Learning Assessment3.  Assess knowledge level of disease process/condition, treatment plan, diagnostic tests and medications4.  Explain disease process/condition, treatment plan, diagnostic tests and medications  Outcome: Progressing       Patient is not progressing towards the following goals:

## 2025-03-19 NOTE — PROGRESS NOTES
[facility] Internal Medicine Daily Progress Note    Date of Service  3/19/2025    R Team: R IM Blue Team   Attending: Viad Friedman M.d.  Senior Resident: Dr. Zapata  Intern:  Dr. Ian Barrera  Contact Number: [phone]    Chief Complaint  Coty Valdivia is a 79 y.o. female admitted 3/15/2025 with   Chief Complaint   Patient presents with    Extremity Weakness     R sided weakness began x2 days ago, LKW Wedn, states Rx thinner but has not been taking, NIH with aphasia with slow response, L sided facial numbness, R sided arm weakness     Chest Pain     Chest pain began yesterday, mid chest pain 9/10, tachypnea, home O2 as needed, hx smoking         Hospital Course  Ms. Coty Valdivia is an 79-year-old female with history of smoking and hypertension who presented 3/15 with right-sided weakness, confusion, and chest pain.      Patient lives with her son, patient is poor historian and not able to provide a good history, patient noticed weakness on the left arm and leg 4 days before the admission with a chest pain, pressure chest pain, does not radiate, no loss of consciousness or other symptoms.  On admission patient was found to have elevated blood pressure 190/94, according to her son patient's taking her medication regularly at home?  EKG did not show any ischemia and troponin 22 and 21 with , creatinine was 1.4 and no history of CKD?  Patient will be admitted to the hospital for hypertensive emergency and possible stroke.  Patient admitted to hospital medicine for management of care.    During this hospitalization, we will pursue an MRI brain, cardiac stress test, echocardiogram, ultrasound renal, and PT/OT.  Urine analysis negative for any signs of infectious process.  CT head and neck negative for any acute findings.  Will wait for the results for plan of care.    Interval Problem Update  - Pending placement  - No significant change in management  - Patient is stable this morning with no new  concerns  - Stop neurochecks  -Continue fluid repletion with 83 cc/h for another 12 hours given patient is likely in prerenal azotemia with a FENa of 0.8%. ATN also still a possibility    I have discussed this patient's plan of care and discharge plan at IDT rounds today with Case Management, Nursing, Nursing leadership, and other members of the IDT team.    Consultants/Specialty  neurology     Code Status  Full Code    Disposition  The patient is medically cleared for discharge to home or a post-acute facility.  Anticipate discharge to: skilled nursing facility    I have placed the appropriate orders for post-discharge needs.    Review of Systems  Review of Systems   Constitutional:  Negative for chills and fever.   HENT: Negative.     Eyes: Negative.    Respiratory:  Negative for shortness of breath.    Cardiovascular:  Negative for chest pain.   Gastrointestinal: Negative.  Negative for constipation, diarrhea, nausea and vomiting.   Genitourinary: Negative.    Musculoskeletal:  Negative for myalgias.   Skin: Negative.    Neurological:  Negative for weakness.        Physical Exam  Temp:  [36.4 °C (97.5 °F)-36.8 °C (98.2 °F)] 36.4 °C (97.5 °F)  Pulse:  [47-69] 47  Resp:  [16-18] 16  BP: (130-158)/(55-71) 151/57  SpO2:  [93 %-95 %] 94 %    Physical Exam  Vitals and nursing note reviewed.   HENT:      Head: Normocephalic.      Nose: Nose normal.      Mouth/Throat:      Mouth: Mucous membranes are moist.      Pharynx: Oropharynx is clear.   Eyes:      Pupils: Pupils are equal, round, and reactive to light.   Cardiovascular:      Rate and Rhythm: Normal rate and regular rhythm.      Pulses: Normal pulses.   Pulmonary:      Effort: Pulmonary effort is normal.      Breath sounds: Normal breath sounds.   Abdominal:      General: Bowel sounds are normal.      Palpations: Abdomen is soft.   Musculoskeletal:      Cervical back: Normal range of motion and neck supple.   Skin:     General: Skin is dry.      Capillary Refill:  Capillary refill takes 2 to 3 seconds.   Neurological:      Mental Status: She is alert. Mental status is at baseline.      Motor: Weakness present.      Comments: RUE weakness deltoids and triceps         Fluids    Intake/Output Summary (Last 24 hours) at 3/19/2025 0723  Last data filed at 3/19/2025 0555  Gross per 24 hour   Intake 2317.5 ml   Output 500 ml   Net 1817.5 ml       Laboratory  Recent Labs     03/17/25 0417 03/18/25  0140 03/19/25  0043   WBC 6.5 5.5 5.8   RBC 4.49 4.02* 3.87*   HEMOGLOBIN 14.4 12.8 12.2   HEMATOCRIT 40.8 37.3 36.6*   MCV 90.9 92.8 94.6   MCH 32.1 31.8 31.5   MCHC 35.3 34.3 33.3   RDW 44.7 45.6 47.3   PLATELETCT 164 161* 150*   MPV 10.9 10.9 10.6     Recent Labs     03/17/25 0417 03/18/25  0140 03/19/25  0043   SODIUM 137 137 138   POTASSIUM 3.6 4.0 4.0   CHLORIDE 106 107 109   CO2 20 19* 20   GLUCOSE 92 107* 96   BUN 28* 40* 40*   CREATININE 1.53* 1.82* 1.62*   CALCIUM 9.1 8.9 8.8                       Imaging  US-RENAL   Final Result         1.  Bilateral renal cysts.   2.  Mildly atrophic echogenic bilateral kidneys, appearance suggesting medical renal disease.      MR-BRAIN-W/O   Final Result         1. Age-related cerebral atrophy.      2. Extensive periventricular, juxtacortical, and pontine white matter changes consistent with chronic microvascular ischemic gliosis.      3. Patchy area of acute infarction in the left frontal parietal periventricular white matter extending inferiorly into the left posterior lateral thalamus.      4. Several chronic areas of lacunar-type infarction noted in the left-sided basal ganglia.      EC-ECHOCARDIOGRAM COMPLETE W/O CONT   Final Result      NM-CARDIAC STRESS TEST   Final Result      CT-CTA NECK WITH & W/O-POST PROCESSING   Final Result      CT angiogram of the neck within normal limits.      CT-CTA HEAD WITH & W/O-POST PROCESS   Final Result      CT angiogram of the [place] within normal limits.      CT-CEREBRAL PERFUSION ANALYSIS  "  Final Result      1. Cerebral blood flow less than 30% possibly representing completed infarct = 0 mL. Based on distribution of this finding, this is unlikely to represent artifact.      2. T Max more than 6 seconds possibly representing combination of completed infarct and ischemia = 0 mL. Based on the distribution of this finding, this is unlikely to represent artifact.      3. Mismatched volume possibly representing ischemic brain/penumbra= 0 mL      4.  Please note that this cerebral perfusion study and report is Quantitative and targets supratentorial (cerebral) perfusion for evaluation of large vessel territory acute ischemia/infarction. For example, lacunar infarcts, and brainstem/posterior fossa    ischemia/infarction are not evaluated on this study.  Data acquisition is subject to artifacts which can yield non-anatomically plausible perfusion maps which may be due to motion, bolus timing, signal to noise ratio, or other technical factors.    Perfusion map abnormalities which show non-anatomic distributions are likely artifact.   This study is not \"stand-alone\" and should only be utilized for diagnosis, management/treatment in correlation with CT, CTA, and/or MRI and clinical factors.         DX-CHEST-PORTABLE (1 VIEW)   Final Result         1. Mild coarse interstitial lung markings but no confluent infiltrate or effusion.                          Assessment/Plan  Problem Representation:    * Hypertensive emergency- (present on admission)  Assessment & Plan  Came with the blood pressure more than 200/100 and chest pain with signs of stroke  CTA for head and neck unremarkable. MRI showing patchy area of acute infarction in the left frontal parietal periventricular white matter extending inferiorly into the left posterior and lateral thalamus.  Also noting chronic ischemic changes and chronic lacunar infarct. See acute ischemic stroke for details on management. No heart failure on TTE.  -Continue with " "amlodipine and carvedilol  -Labetalol IV as needed  - Continue atorvastatin 40 mg    Mixed hyperlipidemia  Assessment & Plan   Latest Reference Range & Units Most Recent   Cholesterol,Tot 100 - 199 mg/dL 227 (H)  3/16/25 00:11   Triglycerides 0 - 149 mg/dL 178 (H)  3/16/25 00:11   HDL >=40 mg/dL 63  3/16/25 00:11   LDL <100 mg/dL 128 (H)  3/16/25 00:11   (H): Data is abnormally high    - Atorvastatin 40 mg    Acute ischemic stroke (HCC)- (present on admission)  Assessment & Plan  MRI showing patchy area of acute infarction in the left frontal parietal periventricular white matter extending inferiorly into the left posterior and lateral thalamus. Vascular neurology consulted, recommendations ordered.  - Follow up with stroke clinic  - PT/OT/SLP  - Amlodipine 5 mg  - Atorvastatin 40 mg  - Carvedilol 6.25 mg BID  - ASA 81 mg PO  - q4h and PRN neuro assessment. VS per nursing/unit protocol.   - BP goal is normotension, 100-130/60-80  - Could consider ZioPatch on discharge to screen for atrial fibrillation/flutter    ACP (advance care planning)- (present on admission)  Assessment & Plan  -Palliative care was consulted for goals of care discussions given patient continues to be full code even after extensive discussions with our team. Patient expresses hesitation and says \"I do not care\". However, wants to stay alive so she can finish the quilt she is making for her son.  -Discussed with patient's son at bedside and the patient herself about possible postacute rehab that was recommended by PT/OT.  We also discussed the CODE STATUS.  They decided to talk amongst themselves and report back to us what they want to decide.  Patient will likely be going to subacute rehab upon discharge.  We will continue to follow.    Right sided weakness- (present on admission)  Assessment & Plan  Patient is on high risk for stroke  Mild weakness and discoordination on the right side  CT scan did not show any acute finding and no bleeding " also CTA with no stenosis for neck and head  MRI showing patchy area of acute infarction in the left frontal parietal periventricular white matter extending inferiorly into the left posterior and lateral thalamus.  Also noting chronic ischemic changes and chronic lacunar infarct. Acute findings consistent with symptoms.  -Aspirin and atorvastatin  -Telemetry  - See acute ischemic stroke for more details    Malnutrition (HCC)- (present on admission)  Assessment & Plan  With history of smoking and weight loss  Patient is on high risk for malignancy  Needs malignancy screening as outpatient  - Consider nutrition consult    ERROL (acute kidney injury) (HCC)- (present on admission)  Assessment & Plan  Baseline unknown, presented with Cr 1.48. Ultrasound did show signs of medical renal disease, likely has underlying CKD. Suspect from uncontrolled hypertension.  Creatinine increased to 1.8 and urine became dark brown. Urinalysis showed increase in RBCs. FENa more consistent with prerenal but strong suspicion for ATN. I&Os initially not well documented because patient voided on bed. PureWick placed.  - Gentle IV fluids  - Monitor I&Os  - Renally adjust medications and avoid nephrotoxins    Smoking- (present on admission)  Assessment & Plan  Patient recounseled and reeducated on smoking cessation    Patient needs cancer screening with CT scan for chest    Chest pain  Assessment & Plan  The ASCVD Risk score (Monalisa DK, et al., 2019) failed to calculate for the following reasons:    The 2019 ASCVD risk score is only valid for ages 40 to 79  Patient is on high risk for coronary artery disease  Her chest pain possibly related to hypertensive emergency  -EKG did not show ischemia and troponin around 21  -Continue telemetry   -TTE EF 62%; mild LVH; moderate to severe Aortic insufficiency  -Cardiac stress test no reversible defects  -Aspirin and atorvastatin  -Encouraged the patient to quit smoking         VTE prophylaxis: SCDs/TEDs  and heparin ppx    I have performed a physical exam and reviewed and updated ROS and Plan today (3/19/2025). In review of yesterday's note (3/18/2025), there are no changes except as documented above.

## 2025-03-19 NOTE — DIETARY
"Nutrition Services: Initial Assessment     Day 2 of admit. Coty Valdivia is 79 y.o., female with admitting DX of Hypertensive emergency, Right sided weakness     Consult Received for: BMI <19, malnutrition per problems list    Current Hospital Problems List:    Hypertensive emergency   Smoking    KI (acute kidney injury)    Malnutrition    Right sided weakness    Mixed hyperlipidemia     Nutrition Assessment:      Height: 160 cm (5' 3\")  Weight: 42.3 kg (93 lb 4.1 oz)  Weight taken via: Bed Scale  BMI Calculated: 17.06  BMI Classification: Underweight      Wt Readings from Last 10 Encounters:   03/18/25 42.3 kg (93 lb 4.1 oz)     Objective:   Admitted with extremity weakness, chest pain.  Pertinent Medical Hx: hypertension  Pertinent Labs: BUN 40, Creatinine 1.62  Pertinent Meds: reviewed  Skin/Wounds:  no skin breakdown noted  Food Allergies: none noted  Last BM:  Not observed  03/15/25 (PTA)     Current Diet Order/Intake:   IDDSI Level 6 Soft / Bite sized with thin liquids    Subjective:   Patient reported UBW: unable to recall  Dietary Recall/Energy Intake: <50 This indicates Insufficient energy intake.   I met with pt at bedside. Pt was sitting up in bed, she appeared thin. Pt shared that her appetite was low for a \"few days\" prior to admit. Pt is unsure of wt loss, and does not know her UBW. She declined snacks or oral nutrition supplements at this time.     Nutrition Focused Physical Exam (NFPE)  Weight Loss: unknown, no hx per chart review.  Muscle Mass: Moderate Wasting at clavicles, shoulders  Subcutaneous Fat: Moderate Loss at buccals  Fluid Accumulation: none  Reduced  Strength: N/A in acute care setting.    Nutrition Diagnosis:      Inadequate Oral Intake related to weakness as evidenced by <50% of caloric intake.      Moderate Malnutrition in context of Chronic Illness related to debility as evidenced by physical markers for moderate fat loss and moderate muscle wasting.      Agree with " malnutrition per problems list. RD notified provider  Dr. Ian Barrera to add moderate description to diagnosis .     Nutrition Interventions:      Diet per SLP/MD.  Recommend Ensure Compact (provides 220 calories, 9 g protein per 4 fl oz) BID if needed to optimize intake.  Patient aware of active plan of care as appropriate.     Nutrition Monitoring and Evaluation:      Monitor nutrition POC, goal for 50% intake from meals and supplements.  Additional fluids per MD/DO  Monitor vital signs pertinent to nutrition.    RD following and will provide updated recommendations as indicated.    Lili Caceres R.D.                                       [facility] MALNUTRITION

## 2025-03-19 NOTE — ASSESSMENT & PLAN NOTE
MRI showing patchy area of acute infarction in the left frontal parietal periventricular white matter extending inferiorly into the left posterior and lateral thalamus. Vascular neurology consulted, recommendations ordered.  - Follow up with stroke clinic  - PT/OT/SLP  - Amlodipine 5 mg  - Atorvastatin 40 mg  - Carvedilol 6.25 mg BID  - ASA 81 mg PO  -Stop neurochecks  - BP goal is normotension, 100-130/60-80  - Could consider ZioPatch on discharge to screen for atrial fibrillation/flutter

## 2025-03-19 NOTE — DISCHARGE INSTRUCTIONS
During your hospital stay, we addressed several key health issues to aid in your recovery. You experienced a stroke, and we provided treatment to stabilize your condition and prevent further complications. Physical, occupational, and speech therapy sessions helped improve your movement, daily activities, and speech, and it's important to continue these therapies at home. We monitored your blood pressure and adjusted medications to keep it under control while also checking your heart health. We noticed some changes in your kidney function and adjusted your treatment to protect your kidneys. Quitting smoking is crucial for your health, and we encourage you to join a support program. We also ensured you received proper nutrition to support your recovery. Lastly, we discussed the importance of advance care planning with you and your family. Please follow your discharge instructions and attend follow-up appointments to ensure a smooth recovery. If you have any questions, contact your healthcare provider.        Blood Pressure and Heart Health:  Keep track of your blood pressure at home. It should be between 100-130/60-80. Report any significant changes to your doctor.  Attend follow-up appointments with your heart doctor to monitor your heart health.    Kidney Health:  Drink plenty of fluids as directed by your healthcare provider to help support your kidney function.  Avoid medications that could harm your kidneys unless approved by your doctor.    Diet and Nutrition:  Eat a balanced diet with plenty of fruits and vegetables. A nutritionist may help you plan meals to ensure you're getting the nutrients you need.    Smoking Cessation:  It's important to stop smoking to improve your overall health. Consider joining a smoking cessation program for support.    Stroke Prevention:  Stay active and follow your therapy exercises to help prevent future strokes.  Make sure to attend your follow-up appointment with the stroke  clinic to monitor your progress.    Cancer Screening:  Schedule a CT scan of your chest as recommended by your doctor to screen for any issues related to your smoking history.    Emergency Symptoms:  If you experience severe chest pain, sudden weakness, difficulty speaking, or severe shortness of breath, call 911 or go to the nearest emergency room immediately.    Follow-Up Appointments:  Make sure to attend all your follow-up appointments with your doctors and therapists to ensure you continue to recover well.      Discharge Instructions    Discharged to home by medical transportation with ambulance. Discharged via ambulance, hospital escort: Yes.  Special equipment needed: Not Applicable    Be sure to schedule a follow-up appointment with your primary care doctor or any specialists as instructed.     Discharge Plan:   Diet Plan: Discussed  Activity Level: Discussed  Confirmed Follow up Appointment: Appointment Scheduled  Confirmed Symptoms Management: Discussed  Medication Reconciliation Updated: Yes  Influenza Vaccine Indication: Not indicated: Previously immunized this influenza season and > 8 years of age    I understand that a diet low in cholesterol, fat, and sodium is recommended for good health. Unless I have been given specific instructions below for another diet, I accept this instruction as my diet prescription.   Other diet: As tolerated    Special Instructions: None    -Is this patient being discharged with medication to prevent blood clots?    Yes, Aspirin Aspirin Tablets  What is this medication?  ASPIRIN (AS pir in) lowers the risk of heart attack, stroke, or blood clot. It may also be used to treat mild to moderate pain, inflammation, or arthritis. It belongs to a group of medications called NSAIDs.  This medicine may be used for other purposes; ask your health care provider or pharmacist if you have questions.  COMMON BRAND NAME(S): Aspir-Low, Aspir-Teresa, Aspirtab, Foreign Advanced Aspirin, Foreign  Aspirin, Foreign Aspirin Extra Strength, Foreign Aspirin Plus, Foreign Extra Strength, Foreign Extra Strength Plus, Foreign Genuine Aspirin, Foreign Womens Aspirin, Bufferin, Bufferin Extra Strength, Bufferin Low Dose  What should I tell my care team before I take this medication?  They need to know if you have any of these conditions:  Anemia  Asthma  Bleeding problems  Diabetes  Gout  History of stomach ulcers or bleeding  If you often drink alcohol  Kidney disease  Liver disease  Low level of vitamin K  Lupus  Smoke tobacco  An unusual or allergic reaction to aspirin, tartrazine dye, other medications, dyes, or preservatives  Pregnant or trying to get pregnant  Breast-feeding  How should I use this medication?  Take this medication by mouth with a glass of water. Follow the directions on the package or prescription label. You can take this medication with or without food. If it upsets your stomach, take it with food. Do not take it more often than directed.  Talk to your care team about the use of this medication in children. While this medication may be prescribed for children as young as 12 years of age for selected conditions, precautions do apply. Children and teenagers should not use this medication to treat chicken pox or flu symptoms unless directed by a care team.  Patients over 65 years old may have a stronger reaction and need a smaller dose.  Overdosage: If you think you have taken too much of this medicine contact a poison control center or emergency room at once.  NOTE: This medicine is only for you. Do not share this medicine with others.  What if I miss a dose?  If you are taking this medication on a regular schedule and miss a dose, take it as soon as you can. If it is almost time for your next dose, take only that dose. Do not take double or extra doses.  What may interact with this medication?  Do not take this medication with any of the following:  Cidofovir  Ketorolac  Probenecid  This medication may also  interact with the following:  Alcohol  Alendronate  Bismuth subsalicylate  Flavocoxid  Herbal supplements like feverfew, garlic, seamus, ginkgo biloba, horse chestnut  Medications for diabetes or glaucoma like acetazolamide, methazolamide  Medications for gout  Medications that prevent or treat blood clots like apixaban, clopidogrel, enoxaparin, heparin, rivaroxaban, warfarin  Other aspirin and aspirin-like medications  NSAIDs, medications for pain and inflammation, like ibuprofen or naproxen  Pemetrexed  Sulfinpyrazone  Varicella live vaccine  This list may not describe all possible interactions. Give your health care provider a list of all the medicines, herbs, non-prescription drugs, or dietary supplements you use. Also tell them if you smoke, drink alcohol, or use illegal drugs. Some items may interact with your medicine.  What should I watch for while using this medication?  If you are treating yourself for pain, tell your doctor or health care provider if the pain lasts more than 10 days, if it gets worse, or if there is a new or different kind of pain. Tell your doctor if you see redness or swelling. Also, check with your doctor if you have a fever that lasts for more than 3 days. Only take this medication to prevent heart attacks or blood clotting if prescribed by your doctor or health care provider.  Do not take other medications that contain aspirin, ibuprofen, or naproxen with this medication. Side effects such as stomach upset, nausea, or ulcers may be more likely to occur. Many non-prescription medications contain aspirin, ibuprofen, or naproxen. Always read labels carefully.  This medication can cause serious ulcers and bleeding in the stomach. It can happen with no warning. Smoking, drinking alcohol, older age, and poor health can also increase risks. Call your health care provider right away if you have stomach pain or blood in your vomit or stool.  Alcohol may interfere with the effect of this  medication. Avoid alcoholic drinks.  This medication may cause serious skin reactions. They can happen weeks to months after starting the medication. Contact your health care provider right away if you notice fevers or flu-like symptoms with a rash. The rash may be red or purple and then turn into blisters or peeling of the skin. Or, you might notice a red rash with swelling of the face, lips or lymph nodes in your neck or under your arms.  Talk to your health care provider if you are pregnant before taking this medication. Taking this medication between weeks 20 and 30 of pregnancy may harm your unborn baby. Your health care provider will monitor you closely if you need to take it. After 30 weeks of pregnancy, do not take this medication.  Be careful brushing or flossing your teeth or using a toothpick because you may get an infection or bleed more easily. If you have any dental work done, tell your dentist you are receiving this medication.  This medication may make it more difficult to get pregnant. Talk to your health care provider if you are concerned about your fertility.  What side effects may I notice from receiving this medication?  Side effects that you should report to your care team as soon as possible:  Allergic reactions--skin rash, itching, hives, swelling of the face, lips, tongue, or throat  Bleeding--bloody or black, tar-like stools, vomiting blood or brown material that looks like coffee grounds, red or dark brown urine, red or purple spots on skin, unusual bruising or bleeding  Hearing loss, ringing in ears  Kidney injury--decrease in the amount of urine, swelling of the ankles, hands, or feet  Liver injury--right upper belly pain, loss of appetite, nausea, light-colored stool, dark yellow or brown urine, yellowing of the skin or eyes, unusual weakness, fatigue  Rash, fever, and swollen lymph nodes  Side effects that usually do not require medical attention (report to your care team if they  continue or are bothersome):  Headache  Loss of appetite  Nausea  Upset stomach  This list may not describe all possible side effects. Call your doctor for medical advice about side effects. You may report side effects to FDA at 9-145-IYI-4150.  Where should I keep my medication?  Keep out of the reach of children and pets.  Store at room temperature between 15 and 30 degrees C (59 and 86 degrees F). Protect from heat and moisture. Get rid of any unused medication after the expiration date.  Do not use this medication if it has a strong vinegar smell.  To get rid of medications that are no longer needed or have :  Take the medication to a medication take-back program. Check with your pharmacy or law enforcement to find a location.  If you cannot return the medication, check the label or package insert to see if the medication should be thrown out in the garbage or flushed down the toilet. If you are not sure, ask your care team. If it is safe to put it in the trash, empty the medication out of the container. Mix the medication with cat litter, dirt, coffee grounds, or other unwanted substance. Seal the mixture in a bag or container. Put it in the trash.  NOTE: This sheet is a summary. It may not cover all possible information. If you have questions about this medicine, talk to your doctor, pharmacist, or health care provider.  ©  [employer]vier/Gold Standard (2021-10-29 00:00:00)    Is patient discharged on Warfarin / Coumadin?   No

## 2025-03-19 NOTE — DISCHARGE PLANNING
[id number]  Agency/Facility Name: [facility]  Spoke To: Zoie  Outcome: DPA inquired ins auth started.  DPA advised LSW via teams    [id number]  Agency/Facility Name: [facility]  Spoke To: Zoie  Outcome: DPA received Ph call from Zoie at [facility] advising ins auth has been accepted and has bed available.  DPA advised LSW via teams

## 2025-03-19 NOTE — PROGRESS NOTES
[facility] Internal Medicine Daily Progress Note    Date of Service  3/18/2025    [facility] Team: R IM Blue Team   Attending: Vida Friedman M.d.  Senior Resident: Dr. Zapata  Intern:  Dr. Ian Barrera  Contact Number: [phone]    Chief Complaint  Coty Valdivia is a 79 y.o. female admitted 3/15/2025 with   Chief Complaint   Patient presents with    Extremity Weakness     R sided weakness began x2 days ago, LKW Wedn, states Rx thinner but has not been taking, NIH with aphasia with slow response, L sided facial numbness, R sided arm weakness     Chest Pain     Chest pain began yesterday, mid chest pain 9/10, tachypnea, home O2 as needed, hx smoking         Hospital Course  Ms. Coty Valdivia is an 79-year-old female with history of smoking and hypertension who presented 3/15 with right-sided weakness, confusion, and chest pain.      Patient lives with her son, patient is poor historian and not able to provide a good history, patient noticed weakness on the left arm and leg 4 days before the admission with a chest pain, pressure chest pain, does not radiate, no loss of consciousness or other symptoms.  On admission patient was found to have elevated blood pressure 190/94, according to her son patient's taking her medication regularly at home?  EKG did not show any ischemia and troponin 22 and 21 with , creatinine was 1.4 and no history of CKD?  Patient will be admitted to the hospital for hypertensive emergency and possible stroke.  Patient admitted to hospital medicine for management of care.    During this hospitalization, we will pursue an MRI brain, cardiac stress test, echocardiogram, ultrasound renal, and PT/OT.  Urine analysis negative for any signs of infectious process.  CT head and neck negative for any acute findings.  Will wait for the results for plan of care.    Interval Problem Update  - Patient is alert and pleasant today. She is oriented and understands the plans going forward. Pending  placement.  - Cr increased to 1.82 (yesterday 1.53). Urine dark and brown, urine studies ordered  - Started on gentle fluids due to suspicion for ATN. She denies any concerning symptoms.  - Anticipate patient will be able to discharge tomorrow    I have discussed this patient's plan of care and discharge plan at IDT rounds today with Case Management, Nursing, Nursing leadership, and other members of the IDT team.    Consultants/Specialty  neurology     Code Status  Full Code    Disposition  The patient is medically cleared for discharge to home or a post-acute facility.      I have placed the appropriate orders for post-discharge needs.    Review of Systems  Review of Systems   Constitutional:  Negative for chills and fever.   HENT: Negative.     Eyes: Negative.    Respiratory:  Negative for shortness of breath.    Cardiovascular:  Negative for chest pain.   Gastrointestinal: Negative.  Negative for constipation, diarrhea, nausea and vomiting.   Genitourinary: Negative.    Musculoskeletal:  Negative for myalgias.   Skin: Negative.    Neurological:  Negative for weakness.        Physical Exam  Temp:  [36.4 °C (97.5 °F)-37 °C (98.6 °F)] 36.4 °C (97.6 °F)  Pulse:  [52-69] 60  Resp:  [16-18] 16  BP: (121-158)/(55-71) 139/56  SpO2:  [92 %-95 %] 94 %    Physical Exam  Vitals and nursing note reviewed.   HENT:      Head: Normocephalic.      Nose: Nose normal.      Mouth/Throat:      Mouth: Mucous membranes are moist.      Pharynx: Oropharynx is clear.   Eyes:      Pupils: Pupils are equal, round, and reactive to light.   Cardiovascular:      Rate and Rhythm: Normal rate and regular rhythm.      Pulses: Normal pulses.   Pulmonary:      Effort: Pulmonary effort is normal.      Breath sounds: Normal breath sounds.   Abdominal:      General: Bowel sounds are normal.      Palpations: Abdomen is soft.   Musculoskeletal:      Cervical back: Normal range of motion and neck supple.   Skin:     General: Skin is dry.      Capillary  Refill: Capillary refill takes 2 to 3 seconds.   Neurological:      Mental Status: She is alert. Mental status is at baseline.      Motor: Weakness present.      Comments: RUE weakness deltoids and triceps         Fluids    Intake/Output Summary (Last 24 hours) at 3/18/2025 2005  Last data filed at 3/18/2025 1700  Gross per 24 hour   Intake 1200 ml   Output 300 ml   Net 900 ml       Laboratory  Recent Labs     03/16/25  0011 03/17/25  0417 03/18/25  0140   WBC 7.0 6.5 5.5   RBC 4.47 4.49 4.02*   HEMOGLOBIN 14.5 14.4 12.8   HEMATOCRIT 41.4 40.8 37.3   MCV 92.6 90.9 92.8   MCH 32.4 32.1 31.8   MCHC 35.0 35.3 34.3   RDW 45.8 44.7 45.6   PLATELETCT 161* 164 161*   MPV 10.7 10.9 10.9     Recent Labs     03/16/25  0011 03/17/25  0417 03/18/25  0140   SODIUM 139 137 137   POTASSIUM 3.9 3.6 4.0   CHLORIDE 106 106 107   CO2 20 20 19*   GLUCOSE 102* 92 107*   BUN 26* 28* 40*   CREATININE 1.43* 1.53* 1.82*   CALCIUM 9.2 9.1 8.9               Recent Labs     03/16/25  0011   TRIGLYCERIDE 178*   HDL 63   *       Imaging  US-RENAL   Final Result         1.  Bilateral renal cysts.   2.  Mildly atrophic echogenic bilateral kidneys, appearance suggesting medical renal disease.      MR-BRAIN-W/O   Final Result         1. Age-related cerebral atrophy.      2. Extensive periventricular, juxtacortical, and pontine white matter changes consistent with chronic microvascular ischemic gliosis.      3. Patchy area of acute infarction in the left frontal parietal periventricular white matter extending inferiorly into the left posterior lateral thalamus.      4. Several chronic areas of lacunar-type infarction noted in the left-sided basal ganglia.      EC-ECHOCARDIOGRAM COMPLETE W/O CONT   Final Result      NM-CARDIAC STRESS TEST   Final Result      CT-CTA NECK WITH & W/O-POST PROCESSING   Final Result      CT angiogram of the neck within normal limits.      CT-CTA HEAD WITH & W/O-POST PROCESS   Final Result      CT angiogram of the  "[place] within normal limits.      CT-CEREBRAL PERFUSION ANALYSIS   Final Result      1. Cerebral blood flow less than 30% possibly representing completed infarct = 0 mL. Based on distribution of this finding, this is unlikely to represent artifact.      2. T Max more than 6 seconds possibly representing combination of completed infarct and ischemia = 0 mL. Based on the distribution of this finding, this is unlikely to represent artifact.      3. Mismatched volume possibly representing ischemic brain/penumbra= 0 mL      4.  Please note that this cerebral perfusion study and report is Quantitative and targets supratentorial (cerebral) perfusion for evaluation of large vessel territory acute ischemia/infarction. For example, lacunar infarcts, and brainstem/posterior fossa    ischemia/infarction are not evaluated on this study.  Data acquisition is subject to artifacts which can yield non-anatomically plausible perfusion maps which may be due to motion, bolus timing, signal to noise ratio, or other technical factors.    Perfusion map abnormalities which show non-anatomic distributions are likely artifact.   This study is not \"stand-alone\" and should only be utilized for diagnosis, management/treatment in correlation with CT, CTA, and/or MRI and clinical factors.         DX-CHEST-PORTABLE (1 VIEW)   Final Result         1. Mild coarse interstitial lung markings but no confluent infiltrate or effusion.                          Assessment/Plan  Problem Representation:    * Hypertensive emergency- (present on admission)  Assessment & Plan  Came with the blood pressure more than 200/100 and chest pain with signs of stroke  CTA for head and neck unremarkable. MRI showing patchy area of acute infarction in the left frontal parietal periventricular white matter extending inferiorly into the left posterior and lateral thalamus.  Also noting chronic ischemic changes and chronic lacunar infarct. See acute ischemic stroke " "for details on management. No heart failure on TTE.  -Continue with amlodipine and carvedilol  -Labetalol IV as needed  - Continue atorvastatin 40 mg    Acute ischemic stroke (HCC)- (present on admission)  Assessment & Plan  MRI showing patchy area of acute infarction in the left frontal parietal periventricular white matter extending inferiorly into the left posterior and lateral thalamus. Vascular neurology consulted, recommendations ordered.  - Follow up with stroke clinic  - PT/OT/SLP  - Amlodipine 5 mg  - Atorvastatin 40 mg  - Carvedilol 6.25 mg BID  - ASA 81 mg PO  - q4h and PRN neuro assessment. VS per nursing/unit protocol.   - BP goal is normotension, 100-130/60-80  - Could consider ZioPatch on discharge to screen for atrial fibrillation/flutter    ERROL (acute kidney injury) (HCC)- (present on admission)  Assessment & Plan  Baseline unknown, presented with Cr 1.48. Ultrasound did show signs of medical renal disease, likely has underlying CKD. Suspect from uncontrolled hypertension.  Creatinine increased to 1.8 and urine became dark brown. Urinalysis showed increase in RBCs. FENa more consistent with prerenal but strong suspicion for ATN. I&Os initially not well documented because patient voided on bed. PureWick placed.  - Gentle IV fluids  - Monitor I&Os  - Renally adjust medications and avoid nephrotoxins    ACP (advance care planning)- (present on admission)  Assessment & Plan  -Palliative care was consulted for goals of care discussions given patient continues to be full code even after extensive discussions with our team. Patient expresses hesitation and says \"I do not care\". However, wants to stay alive so she can finish the quilt she is making for her son.  -Discussed with patient's son at bedside and the patient herself about possible postacute rehab that was recommended by PT/OT.  We also discussed the CODE STATUS.  They decided to talk amongst themselves and report back to us what they want to " decide.  Patient will likely be going to subacute rehab upon discharge.  We will continue to follow.    Right sided weakness- (present on admission)  Assessment & Plan  Patient is on high risk for stroke  Mild weakness and discoordination on the right side  CT scan did not show any acute finding and no bleeding also CTA with no stenosis for neck and head  MRI showing patchy area of acute infarction in the left frontal parietal periventricular white matter extending inferiorly into the left posterior and lateral thalamus.  Also noting chronic ischemic changes and chronic lacunar infarct. Acute findings consistent with symptoms.  -Aspirin and atorvastatin  -Telemetry  - See acute ischemic stroke for more details    Smoking- (present on admission)  Assessment & Plan  Patient recounseled and reeducated on smoking cessation    Patient needs cancer screening with CT scan for chest    Malnutrition (HCC)- (present on admission)  Assessment & Plan  With history of smoking and weight loss  Patient is on high risk for malignancy  Needs malignancy screening as outpatient  - Consider nutrition consult    Chest pain  Assessment & Plan  The ASCVD Risk score (Monalisa AARUJO, et al., 2019) failed to calculate for the following reasons:    The 2019 ASCVD risk score is only valid for ages 40 to 79  Patient is on high risk for coronary artery disease  Her chest pain possibly related to hypertensive emergency  -EKG did not show ischemia and troponin around 21  -Continue telemetry   -TTE EF 62%; mild LVH; moderate to severe Aortic insufficiency  -Cardiac stress test no reversible defects  -Aspirin and atorvastatin  -Encouraged the patient to quit smoking         VTE prophylaxis: SCDs/TEDs and heparin ppx    I have performed a physical exam and reviewed and updated ROS and Plan today (3/18/2025). In review of yesterday's note (3/17/2025), there are no changes except as documented above.

## 2025-03-20 ENCOUNTER — PATIENT OUTREACH (OUTPATIENT)
Dept: SCHEDULING | Facility: IMAGING CENTER | Age: 80
End: 2025-03-20
Payer: MEDICARE

## 2025-03-20 VITALS
HEART RATE: 54 BPM | DIASTOLIC BLOOD PRESSURE: 61 MMHG | OXYGEN SATURATION: 97 % | RESPIRATION RATE: 16 BRPM | WEIGHT: 93.25 LBS | HEIGHT: 62 IN | BODY MASS INDEX: 17.16 KG/M2 | SYSTOLIC BLOOD PRESSURE: 149 MMHG | TEMPERATURE: 97.5 F

## 2025-03-20 LAB
ALBUMIN SERPL BCP-MCNC: 3.5 G/DL (ref 3.2–4.9)
BUN SERPL-MCNC: 38 MG/DL (ref 8–22)
CALCIUM ALBUM COR SERPL-MCNC: 9.2 MG/DL (ref 8.5–10.5)
CALCIUM SERPL-MCNC: 8.8 MG/DL (ref 8.5–10.5)
CHLORIDE SERPL-SCNC: 114 MMOL/L (ref 96–112)
CO2 SERPL-SCNC: 18 MMOL/L (ref 20–33)
CREAT SERPL-MCNC: 1.64 MG/DL (ref 0.5–1.4)
GFR SERPLBLD CREATININE-BSD FMLA CKD-EPI: 31 ML/MIN/1.73 M 2
GLUCOSE SERPL-MCNC: 93 MG/DL (ref 65–99)
PHOSPHATE SERPL-MCNC: 3 MG/DL (ref 2.5–4.5)
POTASSIUM SERPL-SCNC: 4.5 MMOL/L (ref 3.6–5.5)
SODIUM SERPL-SCNC: 143 MMOL/L (ref 135–145)

## 2025-03-20 PROCEDURE — 80069 RENAL FUNCTION PANEL: CPT

## 2025-03-20 PROCEDURE — 700102 HCHG RX REV CODE 250 W/ 637 OVERRIDE(OP): Performed by: INTERNAL MEDICINE

## 2025-03-20 PROCEDURE — A9270 NON-COVERED ITEM OR SERVICE: HCPCS | Performed by: NURSE PRACTITIONER

## 2025-03-20 PROCEDURE — 700111 HCHG RX REV CODE 636 W/ 250 OVERRIDE (IP): Performed by: INTERNAL MEDICINE

## 2025-03-20 PROCEDURE — 99239 HOSP IP/OBS DSCHRG MGMT >30: CPT | Mod: GC | Performed by: STUDENT IN AN ORGANIZED HEALTH CARE EDUCATION/TRAINING PROGRAM

## 2025-03-20 PROCEDURE — 36415 COLL VENOUS BLD VENIPUNCTURE: CPT

## 2025-03-20 PROCEDURE — A9270 NON-COVERED ITEM OR SERVICE: HCPCS | Performed by: INTERNAL MEDICINE

## 2025-03-20 PROCEDURE — 700102 HCHG RX REV CODE 250 W/ 637 OVERRIDE(OP): Performed by: NURSE PRACTITIONER

## 2025-03-20 RX ADMIN — AMLODIPINE BESYLATE 5 MG: 5 TABLET ORAL at 05:16

## 2025-03-20 RX ADMIN — CARVEDILOL 6.25 MG: 6.25 TABLET, FILM COATED ORAL at 09:24

## 2025-03-20 RX ADMIN — NICOTINE TRANSDERMAL SYSTEM 21 MG: 21 PATCH, EXTENDED RELEASE TRANSDERMAL at 05:16

## 2025-03-20 RX ADMIN — ASPIRIN 81 MG: 81 TABLET, CHEWABLE ORAL at 05:16

## 2025-03-20 RX ADMIN — HEPARIN SODIUM 5000 UNITS: 5000 INJECTION, SOLUTION INTRAVENOUS; SUBCUTANEOUS at 05:16

## 2025-03-20 ASSESSMENT — PAIN DESCRIPTION - PAIN TYPE: TYPE: ACUTE PAIN

## 2025-03-20 NOTE — DISCHARGE PLANNING
Care Transition Team Final Discharge Disposition    Actual Discharge Information  Discharge Disposition: D/T to SNF with Medicare cert in anticipation of skilled care (03)    Case Management Discharge Planning    Admission Date: 3/15/2025  GMLOS: 2.8  ALOS: 3    6-Clicks ADL Score: 19  6-Clicks Mobility Score: 19      Anticipated Discharge Dispo: Discharge Disposition: D/T to SNF with Medicare cert in anticipation of skilled care (03)  Discharge Address: [address] [place] [address] [address]  [facility] [address]  Discharge Contact Phone Number: [phone]    DME Needed: No    Action(s) Taken: Updated Provider/Nurse on Discharge Plan and OTHER    Pt is scheduled to transfer to [facility] at 1100.  TC placed to pt's son Dominic to notify of transfer.  Pt signed transfer form.  Transfer packet given to bedside RN.  IMM delivered, faxed to [facility] requesting to change transport to 1300.  Ride Line coordinator confirmed change, bedside RN aware.  Pt's son notified.     Escalations Completed: None    Medically Clear: Yes    Barriers to Discharge: None    Is the patient up for discharge tomorrow: No

## 2025-03-20 NOTE — PROGRESS NOTES
Pt discharged to [facility]. Report called to DAVION Rene previously. All pt belongings gathered and taken with pt at discharge. Discharge instructions and teaching discussed with pt, with pt verbalizing understanding. No further needs noted. Pt escorted to [facility] via [facility].

## 2025-03-20 NOTE — PROGRESS NOTES
Report called to DAVION Rene at [facility] at this time. Vitals stable, no c/o pain at present made by pt. All questions answered during report.

## 2025-03-20 NOTE — CARE PLAN
The patient is Stable - Low risk of patient condition declining or worsening    Shift Goals  Clinical Goals: monitor VS, kidney fxn, I&O, IV NS  Patient Goals: rest  Family Goals: NIMA    Progress made toward(s) clinical / shift goals:    Problem: Neuro Status  Goal: Neuro status will remain stable or improve  Description: Target End Date:  Prior to discharge or change in level of careDocument on Neuro assessment in the Assessment flowsheet1.  Assess and monitor neurologic status per provider order/protocol/unit policy2.  Assess level of consciousness and orientation3.  Assess for speech, dysarthria, dysphagia, facial symmetry4.  Assess visual field, eye movements, gaze preference, pupil reaction and size5.  Assess muscle strength and motor response in all four extremities6.  Assess for sensation (numbness and tingling)7.  Assess basic neuro reflexes (cough, gag, corneal)8.  Identify changes in neuro status and report to provider for testing/treatment orders  Outcome: Progressing  Note: Neuro status stable, pt sleepy with delayed responses. A&O x 3.      Problem: Fluid Volume  Goal: Fluid volume balance will be maintained  Description: Target End Date:  Prior to discharge or change in level of careDocument on I/O flowsheet1.  Monitor intake and output as ordered2.  Promote oral intake as appropriate3.  Report inadequate intake or output to physician4.  Administer IV therapy as ordered5.  Weights per provider order6.  Assess for signs and symptoms of bleeding7.  Monitor for signs of fluid overload (respiratory changes, edema, weight gain, increased abdominal girth)8.  Monitor of signs for inadequate fluid volume (poor skin turgor, dry mucous membranes)9.  Instruct patient on adherence to fluid restrictions  Target End Date:  Prior to discharge or change in level of careDocument on I/O flowsheet1.  Monitor intake and output as ordered2.  Promote oral intake as appropriate3.  Report inadequate intake or output to  physician4.  Administer IV therapy as ordered5.  Weights per provider order6.  Assess for signs and symptoms of bleeding7.  Monitor for signs of fluid overload (respiratory changes, edema, weight gain, increased abdominal girth)8.  Monitor of signs for inadequate fluid volume (poor skin turgor, dry mucous membranes)9.  Instruct patient on adherence to fluid restrictions  Outcome: Progressing  Note: IV stopped according to order, urine less concentrated, encouraged pt to drink more.

## 2025-03-20 NOTE — DISCHARGE PLANNING
Care Transition Team Assessment    LMSW met with pt at bedside, pt verified information for face sheet.  Pt lives with mikal Alfaro in a mobile home 5 steps to enter.  Pt's PCP is Nneka Marie.  Pt has a cane and walker, uses DME occasionally.  Pt has monthly SS income, unable to recall amount.  Pt has good support from son Dominic.  Pt denies history of SNF/IPR placement or HH services in past.  Pt denies SA or MH concerns. Pt's son Dominic provides transportation support at baseline.      Obtained SNF choice.  Kiersten has insurance auth.  [employer] transportation scheduled for 1100 tomorrow 3/20.  IMM delivered, faxed to [facility] for processing.  Spoke with pt's son Dominic who is aware of and amenable to plan.    Information Source  Orientation Level: Disoriented to time, Oriented to place, Oriented to situation, Oriented to person  Information Given By: Patient  Who is responsible for making decisions for patient? : Patient    Readmission Evaluation  Is this a readmission?: No    Elopement Risk  Legal Hold: No  Ambulatory or Self Mobile in Wheelchair: Yes  Disoriented: No  Psychiatric Symptoms: None  History of Wandering: No  Elopement this Admit: No  Vocalizing Wanting to Leave: No  Displays Behaviors, Body Language Wanting to Leave: No-Not at Risk for Elopement  Elopement Risk: Not at Risk for Elopement    Interdisciplinary Discharge Planning  Lives with - Patient's Self Care Capacity: Adult Children  Patient or legal guardian wants to designate a caregiver: No  Support Systems: Family Member(s)  Housing / Facility: Mobile Home  Prior Services: Intermittent Physical Support for ADL Per Family    Discharge Preparedness  What is your plan after discharge?: Skilled nursing facility  What are your discharge supports?: Child  Prior Functional Level: Independent with Activities of Daily Living  Difficulity with ADLs: Walking    Functional Assesment  Prior Functional Level: Independent with Activities of Daily  Living    Finances  Financial Barriers to Discharge: No  Prescription Coverage: Yes    Vision / Hearing Impairment  Right Eye Vision: Impaired  Left Eye Vision: Impaired  Hearing Impairment: Both Ears, Hearing Device Not Available    Advance Directive  Advance Directive?: None    Domestic Abuse  Have you ever been the victim of abuse or violence?: No  Possible Abuse/Neglect Reported to:: Not Applicable    Psychological Assessment  History of Substance Abuse: None  History of Psychiatric Problems: No  Non-compliant with Treatment: No  Newly Diagnosed Illness: No    Discharge Risks or Barriers  Discharge risks or barriers?: Post-acute placement / services  Patient risk factors: Vulnerable adult    Anticipated Discharge Information  Discharge Disposition: D/T to SNF with Medicare cert in anticipation of skilled care (03)  Discharge Address: [address] [place] [address] [address]  [facility] [address]  Discharge Contact Phone Number: [phone]

## 2025-03-20 NOTE — PROGRESS NOTES
PALLIATIVE CARE SOCIAL WORK FOLLOW UP NOTE    Patient: Coty Valdivia  Age: 79  Gender: Female  MRN: [id number]  Insurance: Prominence  Date Admitted: 3/15/25  Date of Service: 3/20/25    LMSW met with patient. She was sitting in bed and just finished breakfast. She denies current pain. She will be going to [facility] today. Patient did not discuss code status or advance directives with her son and forrester snot want to make any changes at this time. LMSW explained how to complete documents/change code status outpatient. Patient reported understanding.     [facility] updated care team.    Palliative care to sign off at this time. Please reconsult if needs arise.     Milagros Bowden FRANKI  Palliative Care

## 2025-03-20 NOTE — RESPIRATORY CARE
"   SMOKING CESSATION EDUCATION by COPD CLINICAL EDUCATOR  3/19/2025 at 5:05 PM by Patience Young, RRT     Smoking Cessation Intervention and education completed, 3 minutes spent on smoking cessation education with patient.  Provided smoking cessation packet with \"Tips to Quit\" and brochure for \"Free Smoking Cessation Classes\".            "

## 2025-03-21 NOTE — DOCUMENTATION QUERY
"                                                                         [facility]                                                                       Query Response Note      PATIENT:               DIANE HUMPHREY  ACCT #:                  [id number]  MRN:                     [id number]  :                      1945  ADMIT DATE:       3/15/2025 10:01 AM  DISCH DATE:        3/20/2025 1:38 PM  RESPONDING  PROVIDER #:        [id number]           QUERY TEXT:    In your clinical judgment, please clarify the RD consult finding with a correlating diagnosis if possible.    The patient's clinical indicators include:  79 year old female admitted for a stroke.    3/20 [place]  \"Malnutrition\"    3/19 RD Consult  [place]  she appeared thin. Pt shared that her appetite was low for a \"few days\" prior to admit.  Moderate Malnutrition  moderate fat loss and moderate muscle wasting.   declined snacks or oral nutrition supplements at this time.  Recommend Ensure Compact   BMI 17.06, BMI Classification: Underweight      Risk factors: Smoking, ERROL  Treatment: labs, RD Consult, IVF, declined snacks and supplements, monitor nutrition    [email or web address] [email or web address]  Adilene Ramirez [address] [employer]    Note: If you agree with a diagnosis listed, please remember to include it in your concurrent daily documentation and onto the Discharge Summary.  Options provided:   -- Moderate malnutrition is a valid diagnosis this admission   -- Moderate malnutrition ruled out after further study   -- Unable to determine   -- Other explanation, (Please specify the other explanation)      Query created by: Adilene Ramirez on 3/20/2025 10:19 AM    RESPONSE TEXT:    Moderate malnutrition is a valid diagnosis this admission       QUERY TEXT:    Kidney Failure is documented in the Medical Record. Please specify the acuity.    NOTE:  If an appropriate response is not listed below, please respond with a new note.    The patient's " "Clinical Indicators include:  79 year old female admitted for a stroke.    3/20 [place]  \"complicated by acute kidney injury, likely secondary to uncontrolled hypertension, with suspicion of ATN. However, CKD cannot be ruled out as there are non records of prior serum creatinine\"    3/19 [place] \"-Continue fluid repletion with 83 cc/h for another 12 hours given patient is likely in prerenal azotemia with a FENa of 0.8%. ATN also still a possibility\"    3/18 [place] \"likely has underlying CKD. Suspect from uncontrolled hypertension.  Creatinine increased to 1.8 and urine became dark brown. Urinalysis showed increase in RBCs. FENa more consistent with prerenal but strong suspicion for ATN\"  \"Cr increased to 1.82 (yesterday 1.53). Urine dark and brown, urine studies ordered  - Started on gentle fluids due to suspicion for ATN.\"    3/15 Creatinine 1.48, GFR 35  3/16 Creatinine 1.43, GFR 37  3/17 Creatinine 1.53, GFR 34  3/18 Creatinine 1.82, GFR 28, urine sodium 96, UR creatinine 158.00, protein/creat 214, urine protein random 34.2  3/20 Creatinine 1.64, GFR 31    Risk factors: Smoking, ERROL, stroke, HTN emergency  Treatment: labs, RD Consult, IVF, renal adjustment  of medications were implemented, [email or web address]  Adilene Ramirez [address] [employer]    Note: If you agree with a diagnosis listed, please remember to include it in your concurrent daily documentation and onto the Discharge Summary.  Options provided:   -- ATN is a valid diagnosis being monitored and treated this admission   -- ATN   -- ATN ruled out after further study   -- Other explanation      Query created by: Adilene Ramirez on 3/20/2025 10:35 AM    RESPONSE TEXT:    ATN is a valid diagnosis being monitored and treated this admission          Electronically signed by:  MIKAELA OCHOA 3/21/2025 1:17 PM              "

## 2025-03-26 ENCOUNTER — APPOINTMENT (OUTPATIENT)
Dept: RADIOLOGY | Facility: MEDICAL CENTER | Age: 80
DRG: 481 | End: 2025-03-26
Attending: EMERGENCY MEDICINE
Payer: MEDICARE

## 2025-03-26 ENCOUNTER — HOSPITAL ENCOUNTER (INPATIENT)
Facility: MEDICAL CENTER | Age: 80
End: 2025-03-26
Attending: EMERGENCY MEDICINE | Admitting: INTERNAL MEDICINE
Payer: MEDICARE

## 2025-03-26 DIAGNOSIS — S72.141A CLOSED COMMINUTED INTERTROCHANTERIC FRACTURE OF RIGHT FEMUR, INITIAL ENCOUNTER (HCC): ICD-10-CM

## 2025-03-26 DIAGNOSIS — R55 SYNCOPE, UNSPECIFIED SYNCOPE TYPE: ICD-10-CM

## 2025-03-26 DIAGNOSIS — R00.1 SYMPTOMATIC BRADYCARDIA: ICD-10-CM

## 2025-03-26 DIAGNOSIS — R00.1 BRADYCARDIA: ICD-10-CM
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
## 2025-03-26 PROBLEM — I10 PRIMARY HYPERTENSION: Status: ACTIVE | Noted: 2025-03-26

## 2025-03-26 PROBLEM — N18.31 STAGE 3A CHRONIC KIDNEY DISEASE: Status: ACTIVE | Noted: 2025-03-15

## 2025-03-26 PROBLEM — N18.32 STAGE 3B CHRONIC KIDNEY DISEASE: Status: ACTIVE | Noted: 2025-03-26

## 2025-03-26 LAB
ALBUMIN SERPL BCP-MCNC: 4.1 G/DL (ref 3.2–4.9)
ALBUMIN/GLOB SERPL: 1.5 G/DL
ALP SERPL-CCNC: 48 U/L (ref 30–99)
ALT SERPL-CCNC: 22 U/L (ref 2–50)
ANION GAP SERPL CALC-SCNC: 12 MMOL/L (ref 7–16)
APPEARANCE UR: CLEAR
AST SERPL-CCNC: 24 U/L (ref 12–45)
BACTERIA #/AREA URNS HPF: ABNORMAL /HPF
BASOPHILS # BLD AUTO: 0.6 % (ref 0–1.8)
BASOPHILS # BLD: 0.05 K/UL (ref 0–0.12)
BILIRUB SERPL-MCNC: 0.4 MG/DL (ref 0.1–1.5)
BILIRUB UR QL STRIP.AUTO: NEGATIVE
BUN SERPL-MCNC: 35 MG/DL (ref 8–22)
CALCIUM ALBUM COR SERPL-MCNC: 8.6 MG/DL (ref 8.5–10.5)
CALCIUM SERPL-MCNC: 8.7 MG/DL (ref 8.5–10.5)
CASTS URNS QL MICRO: ABNORMAL /LPF (ref 0–2)
CHLORIDE SERPL-SCNC: 106 MMOL/L (ref 96–112)
CO2 SERPL-SCNC: 20 MMOL/L (ref 20–33)
COLOR UR: YELLOW
CREAT SERPL-MCNC: 1.54 MG/DL (ref 0.5–1.4)
EOSINOPHIL # BLD AUTO: 0.11 K/UL (ref 0–0.51)
EOSINOPHIL NFR BLD: 1.2 % (ref 0–6.9)
EPITHELIAL CELLS 1715: ABNORMAL /HPF (ref 0–5)
ERYTHROCYTE [DISTWIDTH] IN BLOOD BY AUTOMATED COUNT: 47.1 FL (ref 35.9–50)
GFR SERPLBLD CREATININE-BSD FMLA CKD-EPI: 34 ML/MIN/1.73 M 2
GLOBULIN SER CALC-MCNC: 2.7 G/DL (ref 1.9–3.5)
GLUCOSE SERPL-MCNC: 98 MG/DL (ref 65–99)
GLUCOSE UR STRIP.AUTO-MCNC: NEGATIVE MG/DL
HCT VFR BLD AUTO: 38 % (ref 37–47)
HGB BLD-MCNC: 12.7 G/DL (ref 12–16)
IMM GRANULOCYTES # BLD AUTO: 0.06 K/UL (ref 0–0.11)
IMM GRANULOCYTES NFR BLD AUTO: 0.7 % (ref 0–0.9)
KETONES UR STRIP.AUTO-MCNC: ABNORMAL MG/DL
LEUKOCYTE ESTERASE UR QL STRIP.AUTO: ABNORMAL
LYMPHOCYTES # BLD AUTO: 1.36 K/UL (ref 1–4.8)
LYMPHOCYTES NFR BLD: 15.1 % (ref 22–41)
MCH RBC QN AUTO: 32 PG (ref 27–33)
MCHC RBC AUTO-ENTMCNC: 33.4 G/DL (ref 32.2–35.5)
MCV RBC AUTO: 95.7 FL (ref 81.4–97.8)
MICRO URNS: ABNORMAL
MONOCYTES # BLD AUTO: 0.5 K/UL (ref 0–0.85)
MONOCYTES NFR BLD AUTO: 5.5 % (ref 0–13.4)
NEUTROPHILS # BLD AUTO: 6.93 K/UL (ref 1.82–7.42)
NEUTROPHILS NFR BLD: 76.9 % (ref 44–72)
NITRITE UR QL STRIP.AUTO: NEGATIVE
NRBC # BLD AUTO: 0 K/UL
NRBC BLD-RTO: 0 /100 WBC (ref 0–0.2)
PH UR STRIP.AUTO: 5.5 [PH] (ref 5–8)
PLATELET # BLD AUTO: 170 K/UL (ref 164–446)
PMV BLD AUTO: 10.5 FL (ref 9–12.9)
POTASSIUM SERPL-SCNC: 4.3 MMOL/L (ref 3.6–5.5)
PROT SERPL-MCNC: 6.8 G/DL (ref 6–8.2)
PROT UR QL STRIP: 30 MG/DL
RBC # BLD AUTO: 3.97 M/UL (ref 4.2–5.4)
RBC # URNS HPF: ABNORMAL /HPF (ref 0–2)
RBC UR QL AUTO: ABNORMAL
SODIUM SERPL-SCNC: 138 MMOL/L (ref 135–145)
SP GR UR STRIP.AUTO: 1.02
T4 FREE SERPL-MCNC: 1.1 NG/DL (ref 0.93–1.7)
TROPONIN T SERPL-MCNC: 21 NG/L (ref 6–19)
TROPONIN T SERPL-MCNC: 23 NG/L (ref 6–19)
TSH SERPL-ACNC: 8.58 UIU/ML (ref 0.35–5.5)
UROBILINOGEN UR STRIP.AUTO-MCNC: 1 EU/DL
WBC # BLD AUTO: 9 K/UL (ref 4.8–10.8)
WBC #/AREA URNS HPF: ABNORMAL /HPF

## 2025-03-26 PROCEDURE — A9270 NON-COVERED ITEM OR SERVICE: HCPCS | Performed by: STUDENT IN AN ORGANIZED HEALTH CARE EDUCATION/TRAINING PROGRAM

## 2025-03-26 PROCEDURE — 99223 1ST HOSP IP/OBS HIGH 75: CPT | Mod: 25 | Performed by: STUDENT IN AN ORGANIZED HEALTH CARE EDUCATION/TRAINING PROGRAM

## 2025-03-26 PROCEDURE — A9270 NON-COVERED ITEM OR SERVICE: HCPCS | Performed by: NURSE PRACTITIONER

## 2025-03-26 PROCEDURE — 80053 COMPREHEN METABOLIC PANEL: CPT

## 2025-03-26 PROCEDURE — 70450 CT HEAD/BRAIN W/O DYE: CPT

## 2025-03-26 PROCEDURE — 81001 URINALYSIS AUTO W/SCOPE: CPT

## 2025-03-26 PROCEDURE — 700102 HCHG RX REV CODE 250 W/ 637 OVERRIDE(OP): Performed by: NURSE PRACTITIONER

## 2025-03-26 PROCEDURE — 73501 X-RAY EXAM HIP UNI 1 VIEW: CPT | Mod: RT

## 2025-03-26 PROCEDURE — 700102 HCHG RX REV CODE 250 W/ 637 OVERRIDE(OP): Performed by: STUDENT IN AN ORGANIZED HEALTH CARE EDUCATION/TRAINING PROGRAM

## 2025-03-26 PROCEDURE — 99285 EMERGENCY DEPT VISIT HI MDM: CPT

## 2025-03-26 PROCEDURE — G0378 HOSPITAL OBSERVATION PER HR: HCPCS

## 2025-03-26 PROCEDURE — 93005 ELECTROCARDIOGRAM TRACING: CPT | Mod: TC | Performed by: EMERGENCY MEDICINE

## 2025-03-26 PROCEDURE — 85025 COMPLETE CBC W/AUTO DIFF WBC: CPT

## 2025-03-26 PROCEDURE — 99497 ADVNCD CARE PLAN 30 MIN: CPT | Mod: 25 | Performed by: STUDENT IN AN ORGANIZED HEALTH CARE EDUCATION/TRAINING PROGRAM

## 2025-03-26 PROCEDURE — 84443 ASSAY THYROID STIM HORMONE: CPT

## 2025-03-26 PROCEDURE — 36415 COLL VENOUS BLD VENIPUNCTURE: CPT

## 2025-03-26 PROCEDURE — 99406 BEHAV CHNG SMOKING 3-10 MIN: CPT | Performed by: STUDENT IN AN ORGANIZED HEALTH CARE EDUCATION/TRAINING PROGRAM

## 2025-03-26 PROCEDURE — 84484 ASSAY OF TROPONIN QUANT: CPT

## 2025-03-26 PROCEDURE — 700111 HCHG RX REV CODE 636 W/ 250 OVERRIDE (IP): Mod: JZ | Performed by: STUDENT IN AN ORGANIZED HEALTH CARE EDUCATION/TRAINING PROGRAM

## 2025-03-26 PROCEDURE — 84439 ASSAY OF FREE THYROXINE: CPT

## 2025-03-26 RX ORDER — ACETAMINOPHEN 325 MG/1
650 TABLET ORAL EVERY 4 HOURS PRN
COMMUNITY

## 2025-03-26 RX ORDER — ACETAMINOPHEN 325 MG/1
650 TABLET ORAL EVERY 6 HOURS PRN
Status: DISCONTINUED | OUTPATIENT
Start: 2025-03-26 | End: 2025-03-26

## 2025-03-26 RX ORDER — ACETAMINOPHEN 500 MG
1000 TABLET ORAL EVERY 6 HOURS
Status: DISPENSED | OUTPATIENT
Start: 2025-03-26 | End: 2025-03-31

## 2025-03-26 RX ORDER — DONEPEZIL HYDROCHLORIDE 5 MG/1
10 TABLET, FILM COATED ORAL NIGHTLY
Status: DISCONTINUED | OUTPATIENT
Start: 2025-03-26 | End: 2025-04-02 | Stop reason: HOSPADM

## 2025-03-26 RX ORDER — PAROXETINE 20 MG/1
40 TABLET, FILM COATED ORAL DAILY
Status: DISCONTINUED | OUTPATIENT
Start: 2025-03-26 | End: 2025-04-02 | Stop reason: HOSPADM

## 2025-03-26 RX ORDER — OMEPRAZOLE 20 MG/1
40 CAPSULE, DELAYED RELEASE ORAL DAILY
Status: DISCONTINUED | OUTPATIENT
Start: 2025-03-26 | End: 2025-04-02 | Stop reason: HOSPADM

## 2025-03-26 RX ORDER — ENOXAPARIN SODIUM 100 MG/ML
40 INJECTION SUBCUTANEOUS DAILY
Status: DISCONTINUED | OUTPATIENT
Start: 2025-03-26 | End: 2025-03-27

## 2025-03-26 RX ORDER — OXYCODONE HYDROCHLORIDE 10 MG/1
10 TABLET ORAL
Refills: 0 | Status: DISCONTINUED | OUTPATIENT
Start: 2025-03-26 | End: 2025-03-27

## 2025-03-26 RX ORDER — AMLODIPINE BESYLATE 10 MG/1
10 TABLET ORAL DAILY
Status: DISCONTINUED | OUTPATIENT
Start: 2025-03-26 | End: 2025-04-02 | Stop reason: HOSPADM

## 2025-03-26 RX ORDER — IBUPROFEN 800 MG/1
800 TABLET, FILM COATED ORAL 3 TIMES DAILY PRN
Status: DISCONTINUED | OUTPATIENT
Start: 2025-03-31 | End: 2025-03-27

## 2025-03-26 RX ORDER — SIMVASTATIN 20 MG
20 TABLET ORAL NIGHTLY
Status: DISCONTINUED | OUTPATIENT
Start: 2025-03-26 | End: 2025-04-02 | Stop reason: HOSPADM

## 2025-03-26 RX ORDER — HYDRALAZINE HYDROCHLORIDE 20 MG/ML
10 INJECTION INTRAMUSCULAR; INTRAVENOUS EVERY 4 HOURS PRN
Status: DISCONTINUED | OUTPATIENT
Start: 2025-03-26 | End: 2025-04-02 | Stop reason: HOSPADM

## 2025-03-26 RX ORDER — ACETAMINOPHEN 500 MG
1000 TABLET ORAL EVERY 6 HOURS PRN
Status: DISCONTINUED | OUTPATIENT
Start: 2025-03-31 | End: 2025-04-02 | Stop reason: HOSPADM

## 2025-03-26 RX ORDER — OXYCODONE HYDROCHLORIDE 5 MG/1
5 TABLET ORAL
Refills: 0 | Status: DISCONTINUED | OUTPATIENT
Start: 2025-03-26 | End: 2025-03-27

## 2025-03-26 RX ORDER — ASPIRIN 81 MG/1
81 TABLET ORAL DAILY
Status: DISCONTINUED | OUTPATIENT
Start: 2025-03-26 | End: 2025-04-02 | Stop reason: HOSPADM

## 2025-03-26 RX ORDER — AMINO ACIDS/PROTEIN HYDROLYS 15G-100/30
30 LIQUID (ML) ORAL
COMMUNITY

## 2025-03-26 RX ORDER — MORPHINE SULFATE 4 MG/ML
4 INJECTION INTRAVENOUS
Status: DISCONTINUED | OUTPATIENT
Start: 2025-03-26 | End: 2025-03-27

## 2025-03-26 RX ORDER — NICOTINE 21 MG/24HR
14 PATCH, TRANSDERMAL 24 HOURS TRANSDERMAL
Status: DISCONTINUED | OUTPATIENT
Start: 2025-03-26 | End: 2025-04-02 | Stop reason: HOSPADM

## 2025-03-26 RX ORDER — IBUPROFEN 800 MG/1
800 TABLET, FILM COATED ORAL 3 TIMES DAILY
Status: DISCONTINUED | OUTPATIENT
Start: 2025-03-26 | End: 2025-03-27

## 2025-03-26 RX ADMIN — ASPIRIN 81 MG: 81 TABLET, COATED ORAL at 08:34

## 2025-03-26 RX ADMIN — IBUPROFEN 800 MG: 800 TABLET, FILM COATED ORAL at 18:19

## 2025-03-26 RX ADMIN — ACETAMINOPHEN 1000 MG: 500 TABLET ORAL at 18:19

## 2025-03-26 RX ADMIN — NICOTINE 14 MG: 14 PATCH TRANSDERMAL at 08:38

## 2025-03-26 RX ADMIN — IBUPROFEN 800 MG: 800 TABLET, FILM COATED ORAL at 12:06

## 2025-03-26 RX ADMIN — PAROXETINE HYDROCHLORIDE 40 MG: 20 TABLET, FILM COATED ORAL at 08:34

## 2025-03-26 RX ADMIN — SIMVASTATIN 20 MG: 20 TABLET, FILM COATED ORAL at 21:41

## 2025-03-26 RX ADMIN — OXYCODONE HYDROCHLORIDE 10 MG: 10 TABLET ORAL at 12:06

## 2025-03-26 RX ADMIN — ACETAMINOPHEN 650 MG: 325 TABLET ORAL at 08:43

## 2025-03-26 RX ADMIN — DONEPEZIL HYDROCHLORIDE 10 MG: 5 TABLET, FILM COATED ORAL at 21:42

## 2025-03-26 RX ADMIN — AMLODIPINE BESYLATE 10 MG: 10 TABLET ORAL at 08:34

## 2025-03-26 RX ADMIN — OMEPRAZOLE 40 MG: 20 CAPSULE, DELAYED RELEASE ORAL at 08:34

## 2025-03-26 RX ADMIN — ENOXAPARIN SODIUM 40 MG: 100 INJECTION SUBCUTANEOUS at 18:19

## 2025-03-26 ASSESSMENT — COGNITIVE AND FUNCTIONAL STATUS - GENERAL
TURNING FROM BACK TO SIDE WHILE IN FLAT BAD: A LITTLE
MOVING TO AND FROM BED TO CHAIR: A LOT
SUGGESTED CMS G CODE MODIFIER MOBILITY: CL
PERSONAL GROOMING: A LITTLE
HELP NEEDED FOR BATHING: A LOT
STANDING UP FROM CHAIR USING ARMS: A LOT
MOBILITY SCORE: 11
TOILETING: A LOT
DRESSING REGULAR UPPER BODY CLOTHING: A LITTLE
DRESSING REGULAR LOWER BODY CLOTHING: TOTAL
WALKING IN HOSPITAL ROOM: TOTAL
CLIMB 3 TO 5 STEPS WITH RAILING: TOTAL
SUGGESTED CMS G CODE MODIFIER DAILY ACTIVITY: CK
MOVING FROM LYING ON BACK TO SITTING ON SIDE OF FLAT BED: A LOT
DAILY ACTIVITIY SCORE: 15

## 2025-03-26 ASSESSMENT — SOCIAL DETERMINANTS OF HEALTH (SDOH)
IN THE PAST 12 MONTHS, HAS THE ELECTRIC, GAS, OIL, OR WATER COMPANY THREATENED TO SHUT OFF SERVICE IN YOUR HOME?: NO
WITHIN THE LAST YEAR, HAVE YOU BEEN KICKED, HIT, SLAPPED, OR OTHERWISE PHYSICALLY HURT BY YOUR PARTNER OR EX-PARTNER?: NO
WITHIN THE PAST 12 MONTHS, THE FOOD YOU BOUGHT JUST DIDN'T LAST AND YOU DIDN'T HAVE MONEY TO GET MORE: NEVER TRUE
WITHIN THE LAST YEAR, HAVE TO BEEN RAPED OR FORCED TO HAVE ANY KIND OF SEXUAL ACTIVITY BY YOUR PARTNER OR EX-PARTNER?: NO
WITHIN THE LAST YEAR, HAVE YOU BEEN HUMILIATED OR EMOTIONALLY ABUSED IN OTHER WAYS BY YOUR PARTNER OR EX-PARTNER?: NO
WITHIN THE LAST YEAR, HAVE YOU BEEN AFRAID OF YOUR PARTNER OR EX-PARTNER?: NO
WITHIN THE PAST 12 MONTHS, YOU WORRIED THAT YOUR FOOD WOULD RUN OUT BEFORE YOU GOT THE MONEY TO BUY MORE: NEVER TRUE

## 2025-03-26 ASSESSMENT — LIFESTYLE VARIABLES
HOW MANY TIMES IN THE PAST YEAR HAVE YOU HAD 5 OR MORE DRINKS IN A DAY: 0
EVER HAD A DRINK FIRST THING IN THE MORNING TO STEADY YOUR NERVES TO GET RID OF A HANGOVER: NO
AVERAGE NUMBER OF DAYS PER WEEK YOU HAVE A DRINK CONTAINING ALCOHOL: 0
TOTAL SCORE: 0
HAVE PEOPLE ANNOYED YOU BY CRITICIZING YOUR DRINKING: NO
TOTAL SCORE: 0
TOTAL SCORE: 0
DOES PATIENT WANT TO STOP DRINKING: NO
ON A TYPICAL DAY WHEN YOU DRINK ALCOHOL HOW MANY DRINKS DO YOU HAVE: 0
EVER FELT BAD OR GUILTY ABOUT YOUR DRINKING: NO
HAVE YOU EVER FELT YOU SHOULD CUT DOWN ON YOUR DRINKING: NO
ALCOHOL_USE: NO
CONSUMPTION TOTAL: NEGATIVE

## 2025-03-26 ASSESSMENT — PATIENT HEALTH QUESTIONNAIRE - PHQ9
SUM OF ALL RESPONSES TO PHQ9 QUESTIONS 1 AND 2: 0
2. FEELING DOWN, DEPRESSED, IRRITABLE, OR HOPELESS: NOT AT ALL
1. LITTLE INTEREST OR PLEASURE IN DOING THINGS: NOT AT ALL

## 2025-03-26 ASSESSMENT — ENCOUNTER SYMPTOMS
DIZZINESS: 1
CHILLS: 0
SHORTNESS OF BREATH: 0
NAUSEA: 0
FALLS: 1
VOMITING: 0
FEVER: 0
DIARRHEA: 0
COUGH: 0
ABDOMINAL PAIN: 0

## 2025-03-26 ASSESSMENT — PAIN DESCRIPTION - PAIN TYPE
TYPE: ACUTE PAIN
TYPE: ACUTE PAIN

## 2025-03-26 ASSESSMENT — FIBROSIS 4 INDEX
FIB4 SCORE: 2.41
FIB4 SCORE: 5.53

## 2025-03-26 NOTE — ED NOTES
Bedside report received from off going RN/tech: Boogie BLACKBURN, assumed care of patient.  POC discussed with patient. Call light within reach, all needs addressed at this time.       Fall risk interventions in place: Move the patient closer to the nurse's station, Patient's personal possessions are with in their safe reach, Place fall risk sign on patient's door, Keep floor surfaces clean and dry, and Bed Alarm in use (all applicable per [place] Fall risk assessment)   Continuous monitoring: Cardiac Leads, Pulse Ox, or Blood Pressure  IVF/IV medications: Not Applicable   Oxygen: How many liters 1L, Traced the line to wall oxygen, and No oxygen tank in room  Bedside sitter: Not Applicable   Isolation: Not Applicable

## 2025-03-26 NOTE — H&P
Getting only around hospital Medicine History & Physical Note    Date of Service  3/26/2025    Primary Care Physician  Nneka Marie M.D.    Code Status  Full Code    Chief Complaint  Chief Complaint   Patient presents with    Fall     [facility] Found on floor by staff with complaint of severe R hip pain. Patient reported being dizzy before falling. No obvious head trauma. Patient denies head strike, LOC, other injuries. Patient AAOx 3 ( does not remember event)       History of Presenting Illness  Coty Valdivia is a 79 y.o. female who presented 3/26/2025 with fall and hip pain.  PMH of hypertension, osteoporosis, GERD, dyslipidemia, depression, CKD.  Recent admission to this facility 3/15 - 3/20 and was diagnosed with an acute stroke and hypertensive emergency.  She was discharged to rehab and started on carvedilol and amlodipine.  She had a fall today and is complaining of severe right hip pain so she was brought into the ED.  Symptoms were preceded by dizziness, denied chest pain, palpitations, shortness of breath, no fever/chills.    In the ED afebrile, bradycardic with a heart rate in the 40s.  X-ray of the hip shows no acute abnormality.    I discussed the plan of care with patient, bedside RN, and EDP .    Review of Systems  Review of Systems   Constitutional:  Negative for chills and fever.   Respiratory:  Negative for cough and shortness of breath.    Cardiovascular:  Negative for chest pain.   Gastrointestinal:  Negative for abdominal pain, diarrhea, nausea and vomiting.   Genitourinary:  Negative for dysuria and urgency.   Musculoskeletal:  Positive for falls.   Neurological:  Positive for dizziness.       Past Medical History  Osteoporosis, dyslipidemia, hypertension    Surgical History   has no past surgical history on file.     Family history reviewed with patient. There is no family history that is pertinent to the chief complaint.     Social History   reports that she has been  smoking cigarettes. She has never used smokeless tobacco. She reports that she does not currently use alcohol. She reports that she does not use drugs.    Allergies  Allergies   Allergen Reactions    Codeine Nausea     nausea    Penicillins Rash     Rash  > 60 years ago       Medications  Prior to Admission Medications   Prescriptions Last Dose Informant Patient Reported? Taking?   PARoxetine (PAXIL) 20 MG Tab   Yes No   Sig: Take 40 mg by mouth every day. Son does not know when last dose was taken   alendronate (FOSAMAX) 10 MG Tab   Yes No   Sig: Take 10 mg by mouth every morning before breakfast. Son does not know when last dose was taken   amLODIPine (NORVASC) 5 MG Tab   No No   Sig: Take 2 Tablets by mouth every day for 30 days.   aspirin 81 MG EC tablet   No No   Sig: Take 1 Tablet by mouth every day for 30 days.   carvedilol (COREG) 6.25 MG Tab   No No   Sig: Take 1 Tablet by mouth 2 times a day with meals for 30 days.   donepezil (ARICEPT) 5 MG Tab   Yes No   Sig: Take 10 mg by mouth every evening. Son does not know when last dose was taken   ferrous sulfate 325 (65 Fe) MG tablet   Yes No   Sig: Take 325 mg by mouth every day. Son does not know when last dose was taken   ipratropium-albuterol (COMBIVENT RESPIMAT)  MCG/ACT Aero Soln   Yes No   Sig: Inhale 1 Puff 4 times a day. Son does not know when last dose was taken   omeprazole (PRILOSEC) 20 MG delayed-release capsule   Yes No   Sig: Take 40 mg by mouth every day. Son does not know when last dose was taken   simvastatin (ZOCOR) 20 MG Tab   Yes No   Sig: Take 20 mg by mouth every evening. Son does not know when last dose was taken      Facility-Administered Medications: None       Physical Exam  Temp:  [36.2 °C (97.2 °F)] 36.2 °C (97.2 °F)  Pulse:  [42-46] 42  Resp:  [12-19] 18  BP: (126-139)/(60-63) 126/60  SpO2:  [89 %-99 %] 99 %  Blood Pressure : 126/60   Temperature: 36.2 °C (97.2 °F)   Pulse: (!) 42   Respiration: 18   Pulse Oximetry: 99 %  "      Physical Exam  HENT:      Head: Normocephalic and atraumatic.      Mouth/Throat:      Mouth: Mucous membranes are moist.      Pharynx: No oropharyngeal exudate or posterior oropharyngeal erythema.   Cardiovascular:      Rate and Rhythm: Normal rate and regular rhythm.      Pulses: Normal pulses.      Heart sounds: Normal heart sounds. No murmur heard.  Pulmonary:      Effort: Pulmonary effort is normal. No respiratory distress.      Breath sounds: Normal breath sounds.   Musculoskeletal:         General: Tenderness present. No swelling. Normal range of motion.   Skin:     General: Skin is warm and dry.   Neurological:      General: No focal deficit present.      Mental Status: She is alert and oriented to person, place, and time.   Psychiatric:         Mood and Affect: Mood normal.         Laboratory:  Recent Labs     03/26/25  0400   WBC 9.0   RBC 3.97*   HEMOGLOBIN 12.7   HEMATOCRIT 38.0   MCV 95.7   MCH 32.0   MCHC 33.4   RDW 47.1   PLATELETCT 170   MPV 10.5     Recent Labs     03/26/25  0400   SODIUM 138   POTASSIUM 4.3   CHLORIDE 106   CO2 20   GLUCOSE 98   BUN 35*   CREATININE 1.54*   CALCIUM 8.7     Recent Labs     03/26/25  0400   ALTSGPT 22   ASTSGOT 24   ALKPHOSPHAT 48   TBILIRUBIN 0.4   GLUCOSE 98         No results for input(s): \"NTPROBNP\" in the last 72 hours.      Recent Labs     03/26/25  0400   TROPONINT 23*       Imaging:  CT-HEAD W/O   Final Result      No acute process. Moderate chronic ischemic white matter demyelination.               DX-HIP-UNILATERAL-WITH PELVIS-1 VIEW RIGHT   Final Result      No acute process.          X-Ray:  I have personally reviewed the images and compared with prior images. and My impression is: No acute process  EKG:  I have personally reviewed the images and compared with prior images. and My impression is: bradycardia    Assessment/Plan:  Justification for Admission Status  I anticipate this patient will require at least two midnights for appropriate medical " management, necessitating inpatient admission because symptomatic bradycardia  * Symptomatic bradycardia- (present on admission)  Assessment & Plan  Presents with a fall preceded by dizziness.  Found to be bradycardic in the 40s, was started on carvedilol following last admission for hypertensive emergency and acute stroke earlier this month  Discontinue carvedilol, will continue monitoring on telemetry    Fall- (present on admission)  Assessment & Plan  See symptomatic bradycardia above  Complaining of right hip pain, x-ray reviewed and shows no acute bony abnormality  PT, OT    Primary hypertension- (present on admission)  Assessment & Plan  Continue amlodipine, discontinue carvedilol due to presentation of symptomatic bradycardia    Stage 3b chronic kidney disease- (present on admission)  Assessment & Plan  Suspect this is her baseline creatinine.  BMP ordered continue monitoring, avoid nephrotoxic agents    Mixed hyperlipidemia- (present on admission)  Assessment & Plan  Continue statin    ACP (advance care planning)- (present on admission)  Assessment & Plan  I spent 17 minutes at bedside with patient discussing work-up, results, diagnosis, prognosis.  CODE STATUS discussed with patient and wants to be full code.      Smoking- (present on admission)  Assessment & Plan  Patient smokes 1 PPD.  Nicotine patch and/or gum ordered.   I discussed cessation with patient including starting on nicotine patch and/or gum on discharge.  I also discussed medications to help with cessation with patient including Wellbutrin and Chantix, declined.  Smoking cessation discussed with patient for 4 minutes.          VTE prophylaxis: enoxaparin ppx

## 2025-03-26 NOTE — ED PROVIDER NOTES
ER Provider Note    Scribed for Dr. Christiano Loaiza M.D. by Angelia Lopez. 3/26/2025  4:03 AM    Primary Care Provider: Nneka Marie M.D.    CHIEF COMPLAINT  Chief Complaint   Patient presents with    Fall     BIBA from [facility]. Found on floor by staff with complaint of severe R hip pain. Patient reported being dizzy before falling. No obvious head trauma. Patient denies head strike, LOC, other injuries. Patient AAOx 3 ( does not remember event)       EXTERNAL RECORDS REVIEWED  Inpatient Notes Patient admitted on 3/15/25 for hypertensive emergency. Discharged on the 20th after subsequent interventions.     HPI/ROS  LIMITATION TO HISTORY   None    OUTSIDE HISTORIAN(S):  EMS brought in patient and gave report    Coty Valdivia is a 79 y.o. female with a history of hypertension and smoking, who presents to the ED via EMS for a fall onset prior to arrival. She is coming from [facility]. Per EMS report, she had a fall after feeling dizzy. [facility] staff found her on the floor with severe right hip pain. There was no obvious head trauma. At the time, she did deny head strike, loss of consciousness, or other injuries. En route, EMS administered 25 mcg of fentanyl, and reported AAO x3, which is not her baseline. Patient reports right hip pain, but denies any leg pain. She does not remember the fall or if she usually has a lower heart rate.       PAST MEDICAL HISTORY  No past medical history on file.  hypertension and smoking    SURGICAL HISTORY  None noted     FAMILY HISTORY  None pertinent     SOCIAL HISTORY   reports that she has been smoking cigarettes. She has never used smokeless tobacco. She reports that she does not currently use alcohol. She reports that she does not use drugs.    CURRENT MEDICATIONS  Previous Medications    ALENDRONATE (FOSAMAX) 10 MG TAB    Take 10 mg by mouth every morning before breakfast. Son does not know when last dose was taken    AMLODIPINE (NORVASC) 5 MG TAB    Take 2  "Tablets by mouth every day for 30 days.    ASPIRIN 81 MG EC TABLET    Take 1 Tablet by mouth every day for 30 days.    CARVEDILOL (COREG) 6.25 MG TAB    Take 1 Tablet by mouth 2 times a day with meals for 30 days.    DONEPEZIL (ARICEPT) 5 MG TAB    Take 10 mg by mouth every evening. Son does not know when last dose was taken    FERROUS SULFATE 325 (65 FE) MG TABLET    Take 325 mg by mouth every day. Son does not know when last dose was taken    IPRATROPIUM-ALBUTEROL (COMBIVENT RESPIMAT)  MCG/ACT AERO SOLN    Inhale 1 Puff 4 times a day. Son does not know when last dose was taken    OMEPRAZOLE (PRILOSEC) 20 MG DELAYED-RELEASE CAPSULE    Take 40 mg by mouth every day. Son does not know when last dose was taken    PAROXETINE (PAXIL) 20 MG TAB    Take 40 mg by mouth every day. Son does not know when last dose was taken    SIMVASTATIN (ZOCOR) 20 MG TAB    Take 20 mg by mouth every evening. Son does not know when last dose was taken       ALLERGIES  Codeine and Penicillins    PHYSICAL EXAM  /63   Pulse (!) 44   Temp 36.2 °C (97.2 °F) (Temporal)   Resp 19   Ht 1.57 m (5' 1.81\")   Wt 42.3 kg (93 lb 4.1 oz)   SpO2 95%   BMI 17.16 kg/m²   Constitutional: Alert in no apparent distress.  HENT: No signs of trauma, Bilateral external ears normal, Nose normal.   Eyes: Pupils are equal and reactive, Conjunctiva normal, Non-icteric.   Neck: Normal range of motion, No tenderness, Supple,   Cardiovascular: Regular rate and rhythm, no murmurs.   Thorax & Lungs: Normal breath sounds, No respiratory distress, No wheezing, No chest tenderness.   Abdomen: Bowel sounds normal, Soft, No tenderness,   Skin: Warm, Dry, No erythema, No rash.   Back: No bony tenderness, No CVA tenderness.   Extremities: No edema, Right hip tenderness, No cyanosis  Neurologic: Alert, Slow to respond  Psychiatric: Affect normal       DIAGNOSTIC STUDIES & PROCEDURES    Labs:   Labs Reviewed   CBC WITH DIFFERENTIAL - Abnormal; Notable for the " following components:       Result Value    RBC 3.97 (*)     Neutrophils-Polys 76.90 (*)     Lymphocytes 15.10 (*)     All other components within normal limits   COMP METABOLIC PANEL - Abnormal; Notable for the following components:    Bun 35 (*)     Creatinine 1.54 (*)     All other components within normal limits   TROPONIN - Abnormal; Notable for the following components:    Troponin T 23 (*)     All other components within normal limits   ESTIMATED GFR - Abnormal; Notable for the following components:    GFR (CKD-EPI) 34 (*)     All other components within normal limits   URINALYSIS,CULTURE IF INDICATED   TSH   FREE THYROXINE   TROPONIN     All labs reviewed by me.    EKG Interpretation:  Interpreted by me  Sinus bradycardia of 45, No ST elevation or depression, or T-wave inversion    Radiology:   The attending Emergency Physician has independently interpreted the diagnostic imaging associated with this visit and is awaiting the final reading from the radiologist, which will be displayed below.    Preliminary interpretation is a follows: No fracture and no intracranial bleed  Radiologist interpretation: See below    CT-HEAD W/O   Final Result      No acute process. Moderate chronic ischemic white matter demyelination.               DX-HIP-UNILATERAL-WITH PELVIS-1 VIEW RIGHT   Final Result      No acute process.           COURSE & MEDICAL DECISION MAKING    ED Observation Status? No; Patient does not meet criteria for ED Observation.     INITIAL ASSESSMENT AND PLAN  Care Narrative:       4:03 AM - Patient seen and evaluated at bedside for right hip pain after a fall. BIB EMS from [facility]. Discussed plan of care, including obtaining a diagnostic workup of labs and imaging. Patient agrees to plan of care. Ordered EKG, Troponin, CMP, CBC 2/ diff, Urinalysis, culture if indicated, DX-Hip unilateral with pelvis right, and CT-Head w/o to evaluate.    4:52 AM - Reviewed imaging and labs. Will plan to hospitalize  patient for bradycardia, syncope, and fall.    5:27 AM - I discussed the patient's case and the above findings with Dr. Hammonds (Hospitalist) who agreed to hospitalize the patient. Patient's care was transferred at this time.      ADDITIONAL PROBLEM LIST AND DISPOSITION   Patient with bradycardia.  Likely the cause of her syncope.  Likely underlying and related to her new medications using beta-blockers.  No intracranial bleed.  Mildly elevated troponin.  Chronic kidney disease noted.  Will need to be admitted for bradycardia and observation.               DISPOSITION AND DISCUSSIONS  I have discussed management of the patient with the following physicians and TUNDE's: Dr. Hammonds (Hospitalist)     Discussion of management with other [facility] or appropriate source(s): None     Barriers to care at this time, including but not limited to:  None .     Decision tools and prescription drugs considered including, but not limited to: Medication modification likely will need to stop the patient's beta-blocker. .    DISPOSITION:  Patient will be hospitalized by Dr. Hammonds (Hospitalist) in guarded condition.    FINAL IMPRESSION   1. Bradycardia    2. Syncope, unspecified syncope type    3. Fall, initial encounter         Angelia CARLSON), am scribing for, and in the presence of, Christiano Loaiza M.D..    Electronically signed by: Angelia Jefferson), 3/26/2025    Christiano CARLSON M.D. personally performed the services described in this documentation, as scribed by Angelia Lopez in my presence, and it is both accurate and complete.    The note accurately reflects work and decisions made by me.  Christiano Loaiza M.D.  3/26/2025  11:29 AM

## 2025-03-26 NOTE — ED NOTES
Rounded on patient.  Alert and oriented.   Patient remains bradycardic.    Patient resting on gurney in room.  Respirations even and unlabored.  Patient endorses no changes and denies any needs at this time.

## 2025-03-26 NOTE — PROGRESS NOTES
Pt arrived to unit via gurney with transport. Assessment completed.Pt A&Ox 3. Pt report or denies pain. Medication per MAR. Tele box in place. Call light and belongings are within reach. POC updated. Pt educated on room and call light, pt verbalized understanding. Needs met.

## 2025-03-26 NOTE — ED TRIAGE NOTES
"Chief Complaint   Patient presents with    Fall     [facility]. Found on floor by staff with complaint of severe R hip pain. Patient reported being dizzy before falling. No obvious head trauma. Patient denies head strike, LOC, other injuries. Patient AAOx 3 ( does not remember event)     EMS reports patient bradycardic enroute.    /63   Pulse (!) 44   Temp 36.2 °C (97.2 °F) (Temporal)   Resp 19   Ht 1.57 m (5' 1.81\")   Wt 42.3 kg (93 lb 4.1 oz)   SpO2 95%   BMI 17.16 kg/m²    "

## 2025-03-26 NOTE — ASSESSMENT & PLAN NOTE
Suspect this is her baseline creatinine.      Creatinine overall stable 1.5-1.6  Avoid nephrotoxic agents  Encourage p.o. hydration

## 2025-03-26 NOTE — DISCHARGE PLANNING
Pt status updated to Observation per attending MD determination, Dr. Larios, and UR committee MD secondary review, Dr. Dang.  Update Patient Status order placed.

## 2025-03-26 NOTE — PROGRESS NOTES
Hospital medicine progress note after midnight:     Ms. Coty Valdivia is a 79 y.o. female who presented 3/26/2025 with fall and hip pain.  PMH of hypertension, osteoporosis, GERD, dyslipidemia, depression, CKD.  Recent admission to this facility 3/15 - 3/20 and was diagnosed with an acute stroke and hypertensive emergency.  She was discharged to rehab and started on carvedilol and amlodipine.  She had a fall today and is complaining of severe right hip pain so she was brought into the ED.  Symptoms were preceded by dizziness, denied chest pain, palpitations, shortness of breath, no fever/chills.     In the ED afebrile, bradycardic with a heart rate in the 40s.  X-ray of the hip shows no acute abnormality.  Patient admitted to hospital medicine for management of care.    Patient will be monitored overnight for symptomatic bradycardia.  Will reduce her beta-blockers and monitor improvement.  If no improvement is noted, will need to consult cardiology.  Discussed with patient right hip imaging which is negative for any fracture or abnormalities.  Will continue multimodal pain management for her right hip.  Will obtain PT/OT evaluation for postacute needs.    Plan of care: Continue telemetry monitoring due to noted symptomatic bradycardia; hold off beta-blockers at this time; consult cardiology if patient continues to have persistent symptomatic bradycardia; continue multimodal pain management of right hip pain    Disposition: Patient currently inpatient status as she is anticipated to stay 2-3 midnights for management of symptomatic bradycardia and right hip pain    Problem list:   Ground-level fall with right hip pain  Symptomatic bradycardia  Recent acute stroke  Recent hypertensive urgency  Osteoporosis  GERD  Dyslipidemia  CKD  Depression      Please note that this dictation was created using voice recognition software. I have made every reasonable attempt to correct obvious errors, but there may be errors of  grammar and possibly content that I did not discover before finalizing the note.    Electronically signed by:  Dr. PJ Hammond, DNP, APRN, FNP-C  Hospitalist Services  [facility]  [phone]  [email or web address]  03/26/25                0763

## 2025-03-26 NOTE — DISCHARGE PLANNING
1600, Code 44 form 2 Medicare Observation Outpatient  Information for Our Patients, was given to Pt per [facility].

## 2025-03-26 NOTE — CARE PLAN
The patient is Stable - Low risk of patient condition declining or worsening    Shift Goals  Clinical Goals: pain control, monitor heart VSS  Patient Goals: pain control, comfort  Family Goals: not present      Problem: Pain - Standard  Goal: Alleviation of pain or a reduction in pain to the patient’s comfort goal  Outcome: Progressing     Problem: Knowledge Deficit - Standard  Goal: Patient and family/care givers will demonstrate understanding of plan of care, disease process/condition, diagnostic tests and medications  Outcome: Progressing     Problem: Skin Integrity  Goal: Skin integrity is maintained or improved  Outcome: Progressing     Problem: Fall Risk  Goal: Patient will remain free from falls  Outcome: Progressing

## 2025-03-26 NOTE — ASSESSMENT & PLAN NOTE
I spent 17 minutes at bedside with patient discussing work-up, results, diagnosis, prognosis.  CODE STATUS discussed with patient and wants to be full code.

## 2025-03-26 NOTE — ASSESSMENT & PLAN NOTE
Presents with a fall preceded by dizziness.  Found to be bradycardic in the 40s, was started on carvedilol following last admission for hypertensive emergency and acute stroke earlier this month    Carvedilol discontinued with improvement in bradycardia would avoid AV josefina blockers in the future

## 2025-03-26 NOTE — ED NOTES
Med Rec completed per MAR sent from [facility]     Allergies reviewed: yes    Oral antibiotics in the past 30 days: no    Anticoagulant in past 14 days: no  Patient takes Aspirin 81 mg daily. Last Dose: 03/25/2025 @ 0700    Dispense history available: no    Pharmacy patient utilizes: [facility]  [phone]

## 2025-03-26 NOTE — ASSESSMENT & PLAN NOTE
Continue amlodipine, discontinue carvedilol due to presentation of symptomatic bradycardia  Consider adding hydralazine if BP continues to increase  Not a candidate for initiating ARB at this time

## 2025-03-27 ENCOUNTER — APPOINTMENT (OUTPATIENT)
Dept: RADIOLOGY | Facility: MEDICAL CENTER | Age: 80
DRG: 481 | End: 2025-03-27
Attending: INTERNAL MEDICINE
Payer: MEDICARE

## 2025-03-27 PROBLEM — N17.9 AKI (ACUTE KIDNEY INJURY) (HCC): Status: ACTIVE | Noted: 2025-03-27

## 2025-03-27 PROBLEM — E44.0 MODERATE PROTEIN-CALORIE MALNUTRITION (HCC): Status: ACTIVE | Noted: 2025-03-27

## 2025-03-27 PROBLEM — M25.551 RIGHT HIP PAIN: Status: ACTIVE | Noted: 2025-03-27

## 2025-03-27 PROBLEM — S72.141A CLOSED COMMINUTED INTERTROCHANTERIC FRACTURE OF PROXIMAL END OF RIGHT FEMUR (HCC): Status: ACTIVE | Noted: 2025-03-27

## 2025-03-27 LAB
ANION GAP SERPL CALC-SCNC: 14 MMOL/L (ref 7–16)
BUN SERPL-MCNC: 42 MG/DL (ref 8–22)
CALCIUM SERPL-MCNC: 8.4 MG/DL (ref 8.5–10.5)
CHLORIDE SERPL-SCNC: 107 MMOL/L (ref 96–112)
CO2 SERPL-SCNC: 19 MMOL/L (ref 20–33)
CREAT SERPL-MCNC: 2.1 MG/DL (ref 0.5–1.4)
ERYTHROCYTE [DISTWIDTH] IN BLOOD BY AUTOMATED COUNT: 49.7 FL (ref 35.9–50)
GFR SERPLBLD CREATININE-BSD FMLA CKD-EPI: 23 ML/MIN/1.73 M 2
GLUCOSE SERPL-MCNC: 94 MG/DL (ref 65–99)
HCT VFR BLD AUTO: 37.6 % (ref 37–47)
HGB BLD-MCNC: 12.5 G/DL (ref 12–16)
MCH RBC QN AUTO: 32.9 PG (ref 27–33)
MCHC RBC AUTO-ENTMCNC: 33.2 G/DL (ref 32.2–35.5)
MCV RBC AUTO: 98.9 FL (ref 81.4–97.8)
PLATELET # BLD AUTO: 141 K/UL (ref 164–446)
PMV BLD AUTO: 10.7 FL (ref 9–12.9)
POTASSIUM SERPL-SCNC: 4.3 MMOL/L (ref 3.6–5.5)
RBC # BLD AUTO: 3.8 M/UL (ref 4.2–5.4)
SODIUM SERPL-SCNC: 140 MMOL/L (ref 135–145)
WBC # BLD AUTO: 7.3 K/UL (ref 4.8–10.8)

## 2025-03-27 PROCEDURE — A9270 NON-COVERED ITEM OR SERVICE: HCPCS | Performed by: STUDENT IN AN ORGANIZED HEALTH CARE EDUCATION/TRAINING PROGRAM

## 2025-03-27 PROCEDURE — 700102 HCHG RX REV CODE 250 W/ 637 OVERRIDE(OP): Performed by: NURSE PRACTITIONER

## 2025-03-27 PROCEDURE — 36415 COLL VENOUS BLD VENIPUNCTURE: CPT

## 2025-03-27 PROCEDURE — 99233 SBSQ HOSP IP/OBS HIGH 50: CPT | Performed by: INTERNAL MEDICINE

## 2025-03-27 PROCEDURE — 770020 HCHG ROOM/CARE - TELE (206)

## 2025-03-27 PROCEDURE — A9270 NON-COVERED ITEM OR SERVICE: HCPCS | Performed by: NURSE PRACTITIONER

## 2025-03-27 PROCEDURE — 700102 HCHG RX REV CODE 250 W/ 637 OVERRIDE(OP): Performed by: STUDENT IN AN ORGANIZED HEALTH CARE EDUCATION/TRAINING PROGRAM

## 2025-03-27 PROCEDURE — 700102 HCHG RX REV CODE 250 W/ 637 OVERRIDE(OP): Performed by: INTERNAL MEDICINE

## 2025-03-27 PROCEDURE — 85027 COMPLETE CBC AUTOMATED: CPT

## 2025-03-27 PROCEDURE — 73700 CT LOWER EXTREMITY W/O DYE: CPT | Mod: RT

## 2025-03-27 PROCEDURE — A9270 NON-COVERED ITEM OR SERVICE: HCPCS | Performed by: INTERNAL MEDICINE

## 2025-03-27 PROCEDURE — 80048 BASIC METABOLIC PNL TOTAL CA: CPT

## 2025-03-27 PROCEDURE — 700111 HCHG RX REV CODE 636 W/ 250 OVERRIDE (IP): Performed by: INTERNAL MEDICINE

## 2025-03-27 PROCEDURE — 700105 HCHG RX REV CODE 258: Performed by: INTERNAL MEDICINE

## 2025-03-27 RX ORDER — OXYCODONE HYDROCHLORIDE 5 MG/1
2.5 TABLET ORAL
Refills: 0 | Status: DISCONTINUED | OUTPATIENT
Start: 2025-03-27 | End: 2025-04-02 | Stop reason: HOSPADM

## 2025-03-27 RX ORDER — DEXTROSE MONOHYDRATE AND SODIUM CHLORIDE 5; .45 G/100ML; G/100ML
INJECTION, SOLUTION INTRAVENOUS CONTINUOUS
Status: DISCONTINUED | OUTPATIENT
Start: 2025-03-27 | End: 2025-03-28

## 2025-03-27 RX ORDER — MORPHINE SULFATE 4 MG/ML
2 INJECTION INTRAVENOUS
Status: DISCONTINUED | OUTPATIENT
Start: 2025-03-27 | End: 2025-04-02 | Stop reason: HOSPADM

## 2025-03-27 RX ORDER — HEPARIN SODIUM 5000 [USP'U]/ML
5000 INJECTION, SOLUTION INTRAVENOUS; SUBCUTANEOUS EVERY 8 HOURS
Status: DISCONTINUED | OUTPATIENT
Start: 2025-03-27 | End: 2025-04-02 | Stop reason: HOSPADM

## 2025-03-27 RX ORDER — TRAMADOL HYDROCHLORIDE 50 MG/1
50 TABLET ORAL EVERY 6 HOURS PRN
Status: DISCONTINUED | OUTPATIENT
Start: 2025-03-27 | End: 2025-04-02 | Stop reason: HOSPADM

## 2025-03-27 RX ORDER — OXYCODONE HYDROCHLORIDE 5 MG/1
5 TABLET ORAL
Refills: 0 | Status: DISCONTINUED | OUTPATIENT
Start: 2025-03-27 | End: 2025-04-02 | Stop reason: HOSPADM

## 2025-03-27 RX ADMIN — IBUPROFEN 800 MG: 800 TABLET, FILM COATED ORAL at 11:00

## 2025-03-27 RX ADMIN — ACETAMINOPHEN 1000 MG: 500 TABLET ORAL at 11:00

## 2025-03-27 RX ADMIN — IBUPROFEN 800 MG: 800 TABLET, FILM COATED ORAL at 05:45

## 2025-03-27 RX ADMIN — OMEPRAZOLE 40 MG: 20 CAPSULE, DELAYED RELEASE ORAL at 05:43

## 2025-03-27 RX ADMIN — TRAMADOL HYDROCHLORIDE 50 MG: 50 TABLET, COATED ORAL at 11:56

## 2025-03-27 RX ADMIN — PAROXETINE HYDROCHLORIDE 40 MG: 20 TABLET, FILM COATED ORAL at 05:43

## 2025-03-27 RX ADMIN — NICOTINE 14 MG: 14 PATCH TRANSDERMAL at 05:44

## 2025-03-27 RX ADMIN — SIMVASTATIN 20 MG: 20 TABLET, FILM COATED ORAL at 20:38

## 2025-03-27 RX ADMIN — DEXTROSE AND SODIUM CHLORIDE: 5; 450 INJECTION, SOLUTION INTRAVENOUS at 19:18

## 2025-03-27 RX ADMIN — HEPARIN SODIUM 5000 UNITS: 5000 INJECTION, SOLUTION INTRAVENOUS; SUBCUTANEOUS at 17:07

## 2025-03-27 RX ADMIN — ACETAMINOPHEN 1000 MG: 500 TABLET ORAL at 00:13

## 2025-03-27 RX ADMIN — TRAMADOL HYDROCHLORIDE 50 MG: 50 TABLET, COATED ORAL at 17:07

## 2025-03-27 RX ADMIN — ACETAMINOPHEN 1000 MG: 500 TABLET ORAL at 18:02

## 2025-03-27 RX ADMIN — AMLODIPINE BESYLATE 10 MG: 10 TABLET ORAL at 05:44

## 2025-03-27 RX ADMIN — OXYCODONE 2.5 MG: 5 TABLET ORAL at 14:13

## 2025-03-27 RX ADMIN — DONEPEZIL HYDROCHLORIDE 10 MG: 5 TABLET, FILM COATED ORAL at 20:38

## 2025-03-27 RX ADMIN — ASPIRIN 81 MG: 81 TABLET, COATED ORAL at 05:43

## 2025-03-27 RX ADMIN — OXYCODONE HYDROCHLORIDE 5 MG: 5 TABLET ORAL at 21:52

## 2025-03-27 RX ADMIN — ACETAMINOPHEN 1000 MG: 500 TABLET ORAL at 05:43

## 2025-03-27 ASSESSMENT — PAIN DESCRIPTION - PAIN TYPE
TYPE: ACUTE PAIN

## 2025-03-27 ASSESSMENT — FIBROSIS 4 INDEX: FIB4 SCORE: 2.9

## 2025-03-27 ASSESSMENT — ENCOUNTER SYMPTOMS
CHILLS: 0
VOMITING: 0
HALLUCINATIONS: 0
FALLS: 1
SORE THROAT: 0
BLURRED VISION: 0
SHORTNESS OF BREATH: 0
ABDOMINAL PAIN: 0
COUGH: 0
FEVER: 0
NAUSEA: 0
PALPITATIONS: 0
HEADACHES: 0
DOUBLE VISION: 0

## 2025-03-27 NOTE — CARE PLAN
The patient is Stable - Low risk of patient condition declining or worsening    Shift Goals  Clinical Goals: Safety, montor HR, pain control  Patient Goals: Rest, pain control  Family Goals: NIMA    Progress made toward(s) clinical / shift goals:      Problem: Pain - Standard  Goal: Alleviation of pain or a reduction in pain to the patient’s comfort goal  Outcome: Progressing     Problem: Knowledge Deficit - Standard  Goal: Patient and family/care givers will demonstrate understanding of plan of care, disease process/condition, diagnostic tests and medications  Outcome: Progressing     Problem: Skin Integrity  Goal: Skin integrity is maintained or improved  Outcome: Progressing     Problem: Fall Risk  Goal: Patient will remain free from falls  Outcome: Progressing       Patient is not progressing towards the following goals:    Patient's risk for injury and falls assessed. Appropriate safety precautions in place. Patient educated to utilize call light for needs. Patient verbalizes understanding.Pt updated on POC, tests, and medications. Pt verbalizes understanding and has no further questions at this time. Pt educated on calling for any more questions. Patient pain has been managed with scheduled medication see MAR.

## 2025-03-27 NOTE — PROGRESS NOTES
4 Eyes Skin Assessment Completed by ALEXEY Kapoor and ALEXEY Kohli.    Head WDL  Ears Redness and Blanching  Nose WDL  Mouth WDL  Neck WDL  Breast/Chest WDL  Shoulder Blades WDL  Spine WDL  (R) Arm/Elbow/Hand Bruising  (L) Arm/Elbow/Hand Bruising  Abdomen Bruising  Groin Redness  Scrotum/Coccyx/Buttocks Redness and Blanching  (R) Leg Bruising  (L) Leg Bruising  (R) Heel/Foot/Toe Bruising  (L) Heel/Foot/Toe Bruising          Devices In Places Tele Box and Pulse Ox      Interventions In Place NC W/Ear Foams, Pillows, and Q2 Turns    Possible Skin Injury No    Pictures Uploaded Into Epic N/A  Wound Consult Placed N/A  RN Wound Prevention Protocol Ordered No

## 2025-03-27 NOTE — ASSESSMENT & PLAN NOTE
As sequlae of fall  Limited AROM right hip  Pain with internal rotation of right leg  Check CT hip to rule out fracture

## 2025-03-27 NOTE — PROGRESS NOTES
Bedside report received and patient care assumed. Pt is resting in bed, A&O3, with no complaints of pain, and is on 3 LPM nasal cannula. Tele box on. All fall precautions are in place, belongings at bedside table.  Pt was updated on POC, no questions or concerns. Pt educated on use of call light for assistance.

## 2025-03-27 NOTE — THERAPY
Occupational Therapy Contact Note    Patient Name: Coty Valdivia  Age:  79 y.o., Sex:  female  Medical Record #: [id number]  Today's Date: 3/27/2025    Discussed missed therapy with RN     03/27/25 2954   Initial Contact Note    Initial Contact Note Order Received and Verified, Occupational Therapy Evaluation in Progress with Full Report to Follow.   Interdisciplinary Plan of Care Collaboration   IDT Collaboration with  Nursing   Collaboration Comments OT consult received and chart reviewed. Attempted to see pt for OT eval. RN requesting to hold therapy at this time. Pt with uncontrolled pain and unable to participate meaningfully at this time. Will hold OT eval and re-attempt as appropriate/able.   Session Information   Date / Session Number  3/27 Attempted - needs eval     ** Updated by RN at 11:39 that pt is now pending CT R hip

## 2025-03-27 NOTE — PROGRESS NOTES
Monitor Summary  Rhythm: SB  Rate: 51-55  Ectopy: (F) PAC  Measurements: 0.16/0.08/0.45  ---12 hr Chart Review---

## 2025-03-27 NOTE — ASSESSMENT & PLAN NOTE
Cr 1.54 --> 2.10 --> 2.02  Stop NSAID's, lovenox transitioned to heparin for dvt ppx  Possibly sequelae of medications vs hypoperfusion due to bradycardia  Monitor    3/31/2025  Serum creatinine overall stable 1.59-1.63

## 2025-03-27 NOTE — PROGRESS NOTES
"Pt up to chair today, one person max assist. Discussed pt's care / pain control with Hospitalist this am. Discussed pt's care with OT this am. Heat packs placed to R hip for pt comfort, pt says: \"Oh, that feels better already.\"      "

## 2025-03-27 NOTE — PROGRESS NOTES
Monitor Summary  Rhythm: SB  Rate: 49-58  Ectopy: PVC  Measurements: .15/.07/.43  ---12 hr Chart Review---

## 2025-03-27 NOTE — THERAPY
Physical Therapy Contact Note    Patient Name: Coty Valdivia  Age:  79 y.o., Sex:  female  Medical Record #: [id number]  Today's Date: 3/27/2025    PT consult received and chart reviewed. Pt continues to have severe pain in her R hip s/p fall at SNF. Per discussion with RN, pt pending CT scan to further rule out fx. Will hold and follow up as able and appropriate.

## 2025-03-27 NOTE — DISCHARGE PLANNING
Case Management Discharge Planning    Admission Date: 3/26/2025  GMLOS: 1.8  ALOS: 0    6-Clicks ADL Score: 15  6-Clicks Mobility Score: 11  PT and/or OT Eval ordered: Yes  Post-acute Referrals Ordered:Yes  Post-acute Choice Obtained: Yes  Has referral(s) been sent to post-acute provider:  Yes      Anticipated Discharge Dispo: Discharge Disposition: D/T to SNF with Medicare cert in anticipation of skilled care (03)    DME Needed: No    Action(s) Taken: Updated Provider/Nurse on Discharge Plan, DC Assessment Complete (See below), Choice obtained, and Referral(s) sent    1445, Pt was discussed in IDT rounds for possible discharge back to SNF tomorrow per Dr. IRENA Weston.    1535, Pt and daughter Camilla was visited at room to obtain assessment information and discuss discharge planning. Pt in agreement to discharge back to [facility]. SNF choice obtained and fax to [facility].      Escalations Completed: None    Medically Clear: No    Next Steps: CM will continue to assist Pt with discharge needs.      Barriers to Discharge: Medical clearance    Is the patient up for discharge tomorrow: No      Care Transition Team Assessment  RN CM met with Pt at bedside to obtain assessment informations. Demographics verified. RN CM introduced self and purpose of visit.   Pt lives with son Dominic in a mobile home with 3 steps to main entrance.  Pt has son and daughter for support.  Pt has Dr. Margarita Bridges MD for PCP  Pt was independent with ADLs and IADLs prior to hospitalization.  Pt uses O2 as needed at home. Pt has O2 tanks only. Pt cannot remember her liter flow for her O2 and which O2 company provided this.      Information Source  Orientation Level: Oriented X4  Information Given By: Patient    Elopement Risk  Legal Hold: No  Ambulatory or Self Mobile in Wheelchair: No-Not an Elopement Risk  Elopement Risk: Not at Risk for Elopement    Interdisciplinary Discharge Planning  Primary Care Physician: Dr. Margarita Bridges MD  Lives  with - Patient's Self Care Capacity: Adult Children  Patient or legal guardian wants to designate a caregiver: No  Support Systems: Children  Housing / Facility: Mobile Home (with 3 step to main entrance)  [facility]    Discharge Preparedness  What is your plan after discharge?: Skilled nursing facility  What are your discharge supports?: Child  Prior Functional Level: Independent with Activities of Daily Living, Independent with Medication Management    Functional Assesment  Prior Functional Level: Independent with Activities of Daily Living, Independent with Medication Management    Finances  Financial Barriers to Discharge: No  Prescription Coverage: Yes    Vision / Hearing Impairment  Vision Impairment : Yes  Right Eye Vision: Wears Glasses, Impaired  Left Eye Vision: Wears Glasses, Impaired  Hearing Impairment : No    Domestic Abuse  Have you ever been the victim of abuse or violence?: No  Possible Abuse/Neglect Reported to:: Not Applicable    Psychological Assessment  History of Substance Abuse: None  History of Psychiatric Problems: No    Anticipated Discharge Information  Discharge Disposition: D/T to SNF with Medicare cert in anticipation of skilled care (03)

## 2025-03-27 NOTE — CARE PLAN
The patient is Watcher - Medium risk of patient condition declining or worsening    Shift Goals  Clinical Goals: mobiity, pain control, safety  Patient Goals: pain control  Family Goals: NIMA    Progress made toward(s) clinical / shift goals:  pt up to chair today, bed alarm on. Pt awaiting PT / OT, pain control issues today, CT of R hip done.      Problem: Pain - Standard  Goal: Alleviation of pain or a reduction in pain to the patient’s comfort goal  Outcome: Progressing  Note: Receiving medication for pain control, see MAR     Problem: Knowledge Deficit - Standard  Goal: Patient and family/care givers will demonstrate understanding of plan of care, disease process/condition, diagnostic tests and medications  Outcome: Progressing  Note: Discussed plan of care for today w/ pt this am, she is A+O x 3, pt forgetful, reoriented concerning time intermittently today.  She verbalizes understanding of her plan of care, no questions. Emotional support given. Reinforcing education as necessary. Daughter updated concerning plan of care today.

## 2025-03-27 NOTE — DIETARY
"Nutrition Services: Initial Assessment     Day 0 of admit. Coty Valdivia is 79 y.o., female with admitting DX of Symptomatic bradycardia [R00.1].    Consult Received for: BMI    Current Hospital Problems List:  symptomatic bradycardia, Stage 3 CKD. Primary hypertension. Fall, Mixed hyperlipemia. Smoking        Nutrition Assessment:      Height: 157 cm (5' 1.81\")  Weight: 44 kg (97 lb)  Weight taken via: Bed Scale  BMI Calculated: 17.16  BMI Classification: Underweight       Weight Readings from Last 5 encounters:   Wt Readings from Last 5 Encounters:   03/27/25 44 kg (97 lb)   03/18/25 42.3 kg (93 lb 4.1 oz)     Non- noted weight loss per chart.       Objective:   Pertinent Medical Hx: CKD stage 3, moderate malnutrition.   Pertinent Labs: RBC 3.80, MCV 98.9. Platelet count 141, CO2 19. BUN 42, Creatinine 2.10. GFR 23, Correct calcium 8.4.  Pertinent Meds: PPI  Skin/Wounds:  n/a   Food Allergies: NKFA   Last BM:      (PTA)       Current Diet Order/Intake:   Regular diet    Subjective:     I saw the patient at the bedside for MST . The patient was admitted following a fall and was previously at [facility]. Her PHMX includes GERD, depression, and CKD stage 3. She was recently admitted from 3/15 to 3/20 due to an acute stroke and hypertension. The patient denied experiencing appetite loss and reported sufficient PO at SNF, maintaining PO>  75% . She also denied abdominal pain, nausea, or vomiting. NFPE revealed mild fat and muscle wasting. Supplements were offered, but patient declined at this time.    Patient reported UBW: n/a   Dietary Recall/Energy Intake: Patient denied appetite loss prior hospital admission. She stated eating well at [facility] with 3 meals a day, PO> 75% at each meals.      This indicates Sufficient energy intake.       Nutrition Focused Physical Exam (NFPE)  Weight Loss: Non- noted weight loss per chart   Muscle Mass: Mild Wasting at temple, calf, patella   Subcutaneous Fat: Mild Loss " at buccal   Fluid Accumulation: n/a   Reduced  Strength: N/A in acute care setting.    Nutrition Diagnosis:      Moderate Malnutrition in context of Chronic Illness /CKD stage 3 related to moderate fat and muscle wasting  as evidenced by Mild muscle loss at ( temple, calf, patella) Mid fat loss at ( buccal) .      RD agreed with MD diagnose for Moderate malnutrition       Nutrition Interventions:      Patient aware of active plan of care as appropriate.       Nutrition Monitoring and Evaluation:      Monitor nutrition POC, goal for > 50 % intake from meals and supplements.  Additional fluids per MD/DO  Monitor vital signs pertinent to nutrition.    RD following and will provide updated recommendations as indicated.      Tatiana CARR/AND CRITERIA FOR MALNUTRITION

## 2025-03-27 NOTE — PROGRESS NOTES
"Received care of pt from NOC RN this am. Pt is A+O x 3, needs reminder for date / time. Discussed pt's admitting diagnosis with her; she says: \"Oh, I don't remember falling.\" Emotional support given. C/O discomfort in R hip, pt repositioned for comfort. Discussed pt's care with charge. Pt does not have her glasses and bottom dentures, these are not in pt room; called [facility] x 2 this am in attempt to locate. Message left w/ [facility] to call back.     "

## 2025-03-27 NOTE — PROGRESS NOTES
Hospital Medicine Daily Progress Note    Date of Service  3/27/2025    Chief Complaint  Coty Valdivia is a 79 y.o. female admitted 3/26/2025 with symptomatic bradycardia, fall    Hospital Course  No notes on file    Interval Problem Update  Patient was seen and examined at bedside.  No acute events overnight. Patient is resting comfortably in bed and in no acute distress.     Imaging of hip negative for fracture, has continued pain and impaired mobility  Patient is requiring more than 2 midnights for close monitoring of bradycardia, possibly secondary to beta blocker which will require at least 2 midnight for appropriate washout of medicaiton    Cr 1.54 --> 2.10  CT hip - Comminuted right intertrochanteric femur fracture   Consult to ortho  NPO    I have discussed this patient's plan of care and discharge plan at IDT rounds today with Case Management, Nursing, Nursing leadership, and other members of the IDT team.      Code Status  Full Code    Disposition  The patient is not medically cleared for discharge to home or a post-acute facility.      I have placed the appropriate orders for post-discharge needs.    Review of Systems  Review of Systems   Constitutional:  Negative for chills and fever.   HENT:  Negative for congestion and sore throat.    Eyes:  Negative for blurred vision and double vision.   Respiratory:  Negative for cough and shortness of breath.    Cardiovascular:  Negative for chest pain and palpitations.   Gastrointestinal:  Negative for abdominal pain, nausea and vomiting.   Genitourinary:  Negative for dysuria and frequency.   Musculoskeletal:  Positive for falls and joint pain.   Skin:  Negative for rash.   Neurological:  Negative for headaches.   Psychiatric/Behavioral:  Negative for hallucinations.         Physical Exam  Temp:  [36.4 °C (97.5 °F)-36.6 °C (97.9 °F)] 36.4 °C (97.5 °F)  Pulse:  [52-56] 56  Resp:  [16] 16  BP: (111-156)/(43-61) 138/61  SpO2:  [86 %-97 %] 95 %    Physical  Exam  Vitals reviewed.   Constitutional:       Appearance: She is obese.      Comments: Frail in appearance   HENT:      Nose: No congestion.      Mouth/Throat:      Pharynx: No oropharyngeal exudate.   Eyes:      General: No scleral icterus.     Pupils: Pupils are equal, round, and reactive to light.   Cardiovascular:      Rate and Rhythm: Regular rhythm. Bradycardia present.   Pulmonary:      Breath sounds: No wheezing.   Abdominal:      Tenderness: There is no abdominal tenderness. There is no guarding or rebound.   Musculoskeletal:      Comments: Limited AROM right hip  Pain with internal rotation of right leg   Skin:     Coloration: Skin is not jaundiced.      Findings: No bruising.   Neurological:      General: No focal deficit present.      Mental Status: She is alert.      Motor: No weakness.   Psychiatric:         Mood and Affect: Mood normal.         Behavior: Behavior normal.         Fluids    Intake/Output Summary (Last 24 hours) at 3/27/2025 1517  Last data filed at 3/27/2025 1400  Gross per 24 hour   Intake 900 ml   Output 650 ml   Net 250 ml        Laboratory  Recent Labs     03/26/25  0400 03/27/25  0148   WBC 9.0 7.3   RBC 3.97* 3.80*   HEMOGLOBIN 12.7 12.5   HEMATOCRIT 38.0 37.6   MCV 95.7 98.9*   MCH 32.0 32.9   MCHC 33.4 33.2   RDW 47.1 49.7   PLATELETCT 170 141*   MPV 10.5 10.7     Recent Labs     03/26/25  0400 03/27/25  0148   SODIUM 138 140   POTASSIUM 4.3 4.3   CHLORIDE 106 107   CO2 20 19*   GLUCOSE 98 94   BUN 35* 42*   CREATININE 1.54* 2.10*   CALCIUM 8.7 8.4*                   Imaging  CT-HEAD W/O   Final Result      No acute process. Moderate chronic ischemic white matter demyelination.               DX-HIP-UNILATERAL-WITH PELVIS-1 VIEW RIGHT   Final Result      No acute process.      CT-HIP W/O PLUS RECONS RIGHT    (Results Pending)        Assessment/Plan  * Symptomatic bradycardia- (present on admission)  Assessment & Plan  Presents with a fall preceded by dizziness.  Found to be  bradycardic in the 40s, was started on carvedilol following last admission for hypertensive emergency and acute stroke earlier this month  Discontinue carvedilol, will continue monitoring on telemetry    Patient is requiring more than 2 midnights for close monitoring of bradycardia, possibly secondary to beta blocker which will require at least 2 midnight for appropriate washout of medicaiton    HR in the 50's today, asymptomatic    ERROL (acute kidney injury) (HCC)  Assessment & Plan  Cr 1.54 --> 2.10  Stop NSAID's, lovenox transitioned to heparin for dvt ppx  Possibly sequelae of medications vs hypoperfusion due to bradycardia  monitor    Right hip pain  Assessment & Plan  As sequlae of fall  Limited AROM right hip  Pain with internal rotation of right leg  Check CT hip to rule out fracture    Primary hypertension- (present on admission)  Assessment & Plan  Continue amlodipine, discontinue carvedilol due to presentation of symptomatic bradycardia  Consider adding hydralazine if BP continues to increase  Not a candidate for initiating ARB at this time    Stage 3b chronic kidney disease- (present on admission)  Assessment & Plan  Suspect this is her baseline creatinine.  BMP ordered continue monitoring, avoid nephrotoxic agents    Moderate protein-calorie malnutrition (HCC)  Assessment & Plan  Dietary consult    Fall- (present on admission)  Assessment & Plan  See symptomatic bradycardia above  Complaining of right hip pain, x-ray reviewed and shows no acute bony abnormality  PT, OT    Mixed hyperlipidemia- (present on admission)  Assessment & Plan  Continue statin    ACP (advance care planning)- (present on admission)  Assessment & Plan  I spent 17 minutes at bedside with patient discussing work-up, results, diagnosis, prognosis.  CODE STATUS discussed with patient and wants to be full code.      Smoking- (present on admission)  Assessment & Plan  Patient smokes 1 PPD.  Nicotine patch and/or gum ordered.   I  discussed cessation with patient including starting on nicotine patch and/or gum on discharge.  I also discussed medications to help with cessation with patient including Wellbutrin and Chantix, declined.  Smoking cessation discussed with patient for 4 minutes.           VTE prophylaxis:    heparin ppx      I have performed a physical exam and reviewed and updated ROS and Plan today (3/27/2025). In review of yesterday's note (3/26/2025), there are no changes except as documented above.    Greater than 51 minutes spent prepping to see patient (e.g. review of tests) obtaining and/or reviewing separately obtained history. Performing a medically appropriate examination and/ evaluation.  Counseling and educating the patient/family/caregiver.  Ordering medications, tests, or procedures.  Referring and communicating with other health care professionals.  Documenting clinical information in EPIC.  Independently interpreting results and communicating results to patient/family/caregiver.  Care coordination.

## 2025-03-27 NOTE — DISCHARGE PLANNING
1456  DPA sent resumption SNF referral to [facility] per SERGEY Trinh.     1543  Agency/Facility Name: [facility]   Spoke To: Leticia   Outcome: JUSTIN called to ask if she will need to submit for insurance auth, per Leticia yes she will need to submit and she will submit for insurance auth right now. SERGEY Trinh notified.     7709  Agency/Facility Name: [facility]   Spoke To: Leticia  Outcome: Leticia called to request PT and OT notes to submit for insurance auth. SERGEY Trinh.

## 2025-03-28 ENCOUNTER — ANESTHESIA (OUTPATIENT)
Dept: SURGERY | Facility: MEDICAL CENTER | Age: 80
End: 2025-03-28
Payer: MEDICARE

## 2025-03-28 ENCOUNTER — SURGERY (OUTPATIENT)
Age: 80
End: 2025-03-28
Payer: MEDICARE

## 2025-03-28 ENCOUNTER — APPOINTMENT (OUTPATIENT)
Dept: RADIOLOGY | Facility: MEDICAL CENTER | Age: 80
DRG: 481 | End: 2025-03-28
Attending: STUDENT IN AN ORGANIZED HEALTH CARE EDUCATION/TRAINING PROGRAM
Payer: MEDICARE

## 2025-03-28 ENCOUNTER — ANESTHESIA EVENT (OUTPATIENT)
Dept: SURGERY | Facility: MEDICAL CENTER | Age: 80
End: 2025-03-28
Payer: MEDICARE

## 2025-03-28 LAB
ALBUMIN SERPL BCP-MCNC: 3.7 G/DL (ref 3.2–4.9)
BUN SERPL-MCNC: 38 MG/DL (ref 8–22)
CALCIUM ALBUM COR SERPL-MCNC: 8 MG/DL (ref 8.5–10.5)
CALCIUM SERPL-MCNC: 7.8 MG/DL (ref 8.5–10.5)
CHLORIDE SERPL-SCNC: 107 MMOL/L (ref 96–112)
CO2 SERPL-SCNC: 19 MMOL/L (ref 20–33)
CREAT SERPL-MCNC: 2.02 MG/DL (ref 0.5–1.4)
ERYTHROCYTE [DISTWIDTH] IN BLOOD BY AUTOMATED COUNT: 48.1 FL (ref 35.9–50)
GFR SERPLBLD CREATININE-BSD FMLA CKD-EPI: 24 ML/MIN/1.73 M 2
GLUCOSE SERPL-MCNC: 116 MG/DL (ref 65–99)
HCT VFR BLD AUTO: 36.7 % (ref 37–47)
HGB BLD-MCNC: 12.2 G/DL (ref 12–16)
MAGNESIUM SERPL-MCNC: 2.1 MG/DL (ref 1.5–2.5)
MCH RBC QN AUTO: 32.4 PG (ref 27–33)
MCHC RBC AUTO-ENTMCNC: 33.2 G/DL (ref 32.2–35.5)
MCV RBC AUTO: 97.3 FL (ref 81.4–97.8)
PHOSPHATE SERPL-MCNC: 3.1 MG/DL (ref 2.5–4.5)
PLATELET # BLD AUTO: 128 K/UL (ref 164–446)
PMV BLD AUTO: 10.1 FL (ref 9–12.9)
POTASSIUM SERPL-SCNC: 3.9 MMOL/L (ref 3.6–5.5)
RBC # BLD AUTO: 3.77 M/UL (ref 4.2–5.4)
SODIUM SERPL-SCNC: 137 MMOL/L (ref 135–145)
WBC # BLD AUTO: 6.9 K/UL (ref 4.8–10.8)

## 2025-03-28 PROCEDURE — 83735 ASSAY OF MAGNESIUM: CPT

## 2025-03-28 PROCEDURE — 99233 SBSQ HOSP IP/OBS HIGH 50: CPT | Performed by: INTERNAL MEDICINE

## 2025-03-28 PROCEDURE — 27245 TREAT THIGH FRACTURE: CPT | Mod: RT | Performed by: STUDENT IN AN ORGANIZED HEALTH CARE EDUCATION/TRAINING PROGRAM

## 2025-03-28 PROCEDURE — 160009 HCHG ANES TIME/MIN: Performed by: STUDENT IN AN ORGANIZED HEALTH CARE EDUCATION/TRAINING PROGRAM

## 2025-03-28 PROCEDURE — 36415 COLL VENOUS BLD VENIPUNCTURE: CPT

## 2025-03-28 PROCEDURE — 0QS606Z REPOSITION RIGHT UPPER FEMUR WITH INTRAMEDULLARY INTERNAL FIXATION DEVICE, OPEN APPROACH: ICD-10-PCS | Performed by: STUDENT IN AN ORGANIZED HEALTH CARE EDUCATION/TRAINING PROGRAM

## 2025-03-28 PROCEDURE — 700105 HCHG RX REV CODE 258: Performed by: ANESTHESIOLOGY

## 2025-03-28 PROCEDURE — 700101 HCHG RX REV CODE 250: Performed by: ANESTHESIOLOGY

## 2025-03-28 PROCEDURE — 73502 X-RAY EXAM HIP UNI 2-3 VIEWS: CPT | Mod: RT

## 2025-03-28 PROCEDURE — 160048 HCHG OR STATISTICAL LEVEL 1-5: Performed by: STUDENT IN AN ORGANIZED HEALTH CARE EDUCATION/TRAINING PROGRAM

## 2025-03-28 PROCEDURE — 700111 HCHG RX REV CODE 636 W/ 250 OVERRIDE (IP): Performed by: ANESTHESIOLOGY

## 2025-03-28 PROCEDURE — 700111 HCHG RX REV CODE 636 W/ 250 OVERRIDE (IP): Performed by: INTERNAL MEDICINE

## 2025-03-28 PROCEDURE — 160015 HCHG STAT PREOP MINUTES: Performed by: STUDENT IN AN ORGANIZED HEALTH CARE EDUCATION/TRAINING PROGRAM

## 2025-03-28 PROCEDURE — A9270 NON-COVERED ITEM OR SERVICE: HCPCS | Performed by: INTERNAL MEDICINE

## 2025-03-28 PROCEDURE — 160041 HCHG SURGERY MINUTES - EA ADDL 1 MIN LEVEL 4: Performed by: STUDENT IN AN ORGANIZED HEALTH CARE EDUCATION/TRAINING PROGRAM

## 2025-03-28 PROCEDURE — 770020 HCHG ROOM/CARE - TELE (206)

## 2025-03-28 PROCEDURE — 700102 HCHG RX REV CODE 250 W/ 637 OVERRIDE(OP): Performed by: INTERNAL MEDICINE

## 2025-03-28 PROCEDURE — 160002 HCHG RECOVERY MINUTES (STAT): Performed by: STUDENT IN AN ORGANIZED HEALTH CARE EDUCATION/TRAINING PROGRAM

## 2025-03-28 PROCEDURE — 160035 HCHG PACU - 1ST 60 MINS PHASE I: Performed by: STUDENT IN AN ORGANIZED HEALTH CARE EDUCATION/TRAINING PROGRAM

## 2025-03-28 PROCEDURE — 110371 HCHG SHELL REV 272: Performed by: STUDENT IN AN ORGANIZED HEALTH CARE EDUCATION/TRAINING PROGRAM

## 2025-03-28 PROCEDURE — 80069 RENAL FUNCTION PANEL: CPT

## 2025-03-28 PROCEDURE — 700105 HCHG RX REV CODE 258: Performed by: INTERNAL MEDICINE

## 2025-03-28 PROCEDURE — C1713 ANCHOR/SCREW BN/BN,TIS/BN: HCPCS | Performed by: STUDENT IN AN ORGANIZED HEALTH CARE EDUCATION/TRAINING PROGRAM

## 2025-03-28 PROCEDURE — 700102 HCHG RX REV CODE 250 W/ 637 OVERRIDE(OP): Performed by: STUDENT IN AN ORGANIZED HEALTH CARE EDUCATION/TRAINING PROGRAM

## 2025-03-28 PROCEDURE — A9270 NON-COVERED ITEM OR SERVICE: HCPCS | Performed by: NURSE PRACTITIONER

## 2025-03-28 PROCEDURE — 700102 HCHG RX REV CODE 250 W/ 637 OVERRIDE(OP): Performed by: NURSE PRACTITIONER

## 2025-03-28 PROCEDURE — A9270 NON-COVERED ITEM OR SERVICE: HCPCS | Performed by: STUDENT IN AN ORGANIZED HEALTH CARE EDUCATION/TRAINING PROGRAM

## 2025-03-28 PROCEDURE — 160029 HCHG SURGERY MINUTES - 1ST 30 MINS LEVEL 4: Performed by: STUDENT IN AN ORGANIZED HEALTH CARE EDUCATION/TRAINING PROGRAM

## 2025-03-28 PROCEDURE — 85027 COMPLETE CBC AUTOMATED: CPT

## 2025-03-28 PROCEDURE — 99223 1ST HOSP IP/OBS HIGH 75: CPT | Mod: 57 | Performed by: STUDENT IN AN ORGANIZED HEALTH CARE EDUCATION/TRAINING PROGRAM

## 2025-03-28 DEVICE — LOCKING SCREW DIA 5X32MM: Type: IMPLANTABLE DEVICE | Site: HIP | Status: FUNCTIONAL

## 2025-03-28 DEVICE — K-WIRE 3MM X 285MM (1EA): Type: IMPLANTABLE DEVICE | Site: HIP | Status: FUNCTIONAL

## 2025-03-28 DEVICE — PIN PRECISION TM TAPER D3.2/3.9 X 450MM (1EA): Type: IMPLANTABLE DEVICE | Site: HIP | Status: FUNCTIONAL

## 2025-03-28 DEVICE — IMPLANTABLE DEVICE: Type: IMPLANTABLE DEVICE | Site: HIP | Status: FUNCTIONAL

## 2025-03-28 RX ORDER — OXYCODONE HCL 5 MG/5 ML
10 SOLUTION, ORAL ORAL
Status: DISCONTINUED | OUTPATIENT
Start: 2025-03-28 | End: 2025-03-28 | Stop reason: HOSPADM

## 2025-03-28 RX ORDER — GLYCOPYRROLATE 0.2 MG/ML
INJECTION INTRAMUSCULAR; INTRAVENOUS PRN
Status: DISCONTINUED | OUTPATIENT
Start: 2025-03-28 | End: 2025-03-28 | Stop reason: SURG

## 2025-03-28 RX ORDER — ROCURONIUM BROMIDE 10 MG/ML
INJECTION, SOLUTION INTRAVENOUS PRN
Status: DISCONTINUED | OUTPATIENT
Start: 2025-03-28 | End: 2025-03-28 | Stop reason: SURG

## 2025-03-28 RX ORDER — MEPERIDINE HYDROCHLORIDE 25 MG/ML
12.5 INJECTION INTRAMUSCULAR; INTRAVENOUS; SUBCUTANEOUS
Status: DISCONTINUED | OUTPATIENT
Start: 2025-03-28 | End: 2025-03-28 | Stop reason: HOSPADM

## 2025-03-28 RX ORDER — HALOPERIDOL 5 MG/ML
1 INJECTION INTRAMUSCULAR
Status: DISCONTINUED | OUTPATIENT
Start: 2025-03-28 | End: 2025-03-28 | Stop reason: HOSPADM

## 2025-03-28 RX ORDER — HYDROMORPHONE HYDROCHLORIDE 1 MG/ML
1 INJECTION, SOLUTION INTRAMUSCULAR; INTRAVENOUS; SUBCUTANEOUS
Status: DISCONTINUED | OUTPATIENT
Start: 2025-03-28 | End: 2025-03-28 | Stop reason: HOSPADM

## 2025-03-28 RX ORDER — MIDAZOLAM HYDROCHLORIDE 1 MG/ML
1 INJECTION INTRAMUSCULAR; INTRAVENOUS
Status: DISCONTINUED | OUTPATIENT
Start: 2025-03-28 | End: 2025-03-28 | Stop reason: HOSPADM

## 2025-03-28 RX ORDER — HYDROMORPHONE HYDROCHLORIDE 1 MG/ML
0.4 INJECTION, SOLUTION INTRAMUSCULAR; INTRAVENOUS; SUBCUTANEOUS
Status: DISCONTINUED | OUTPATIENT
Start: 2025-03-28 | End: 2025-03-28 | Stop reason: HOSPADM

## 2025-03-28 RX ORDER — ONDANSETRON 2 MG/ML
INJECTION INTRAMUSCULAR; INTRAVENOUS PRN
Status: DISCONTINUED | OUTPATIENT
Start: 2025-03-28 | End: 2025-03-28 | Stop reason: SURG

## 2025-03-28 RX ORDER — LABETALOL HYDROCHLORIDE 5 MG/ML
INJECTION, SOLUTION INTRAVENOUS PRN
Status: DISCONTINUED | OUTPATIENT
Start: 2025-03-28 | End: 2025-03-28 | Stop reason: SURG

## 2025-03-28 RX ORDER — CEFAZOLIN SODIUM 1 G/3ML
INJECTION, POWDER, FOR SOLUTION INTRAMUSCULAR; INTRAVENOUS PRN
Status: DISCONTINUED | OUTPATIENT
Start: 2025-03-28 | End: 2025-03-28 | Stop reason: SURG

## 2025-03-28 RX ORDER — ONDANSETRON 2 MG/ML
4 INJECTION INTRAMUSCULAR; INTRAVENOUS
Status: DISCONTINUED | OUTPATIENT
Start: 2025-03-28 | End: 2025-03-28 | Stop reason: HOSPADM

## 2025-03-28 RX ORDER — DIPHENHYDRAMINE HYDROCHLORIDE 50 MG/ML
12.5 INJECTION, SOLUTION INTRAMUSCULAR; INTRAVENOUS
Status: DISCONTINUED | OUTPATIENT
Start: 2025-03-28 | End: 2025-03-28 | Stop reason: HOSPADM

## 2025-03-28 RX ORDER — IPRATROPIUM BROMIDE AND ALBUTEROL SULFATE 2.5; .5 MG/3ML; MG/3ML
3 SOLUTION RESPIRATORY (INHALATION)
Status: DISCONTINUED | OUTPATIENT
Start: 2025-03-28 | End: 2025-03-28 | Stop reason: HOSPADM

## 2025-03-28 RX ORDER — CALCIUM CHLORIDE 100 MG/ML
INJECTION INTRAVENOUS; INTRAVENTRICULAR PRN
Status: DISCONTINUED | OUTPATIENT
Start: 2025-03-28 | End: 2025-03-28 | Stop reason: SURG

## 2025-03-28 RX ORDER — DEXAMETHASONE SODIUM PHOSPHATE 4 MG/ML
INJECTION, SOLUTION INTRA-ARTICULAR; INTRALESIONAL; INTRAMUSCULAR; INTRAVENOUS; SOFT TISSUE PRN
Status: DISCONTINUED | OUTPATIENT
Start: 2025-03-28 | End: 2025-03-28 | Stop reason: SURG

## 2025-03-28 RX ORDER — DEXTROSE MONOHYDRATE AND SODIUM CHLORIDE 5; .45 G/100ML; G/100ML
INJECTION, SOLUTION INTRAVENOUS CONTINUOUS
Status: DISCONTINUED | OUTPATIENT
Start: 2025-03-28 | End: 2025-03-29

## 2025-03-28 RX ORDER — LIDOCAINE HYDROCHLORIDE 20 MG/ML
INJECTION, SOLUTION EPIDURAL; INFILTRATION; INTRACAUDAL; PERINEURAL PRN
Status: DISCONTINUED | OUTPATIENT
Start: 2025-03-28 | End: 2025-03-28 | Stop reason: SURG

## 2025-03-28 RX ORDER — SODIUM CHLORIDE, SODIUM LACTATE, POTASSIUM CHLORIDE, CALCIUM CHLORIDE 600; 310; 30; 20 MG/100ML; MG/100ML; MG/100ML; MG/100ML
INJECTION, SOLUTION INTRAVENOUS
Status: DISCONTINUED | OUTPATIENT
Start: 2025-03-28 | End: 2025-03-28 | Stop reason: SURG

## 2025-03-28 RX ORDER — OXYCODONE HCL 5 MG/5 ML
5 SOLUTION, ORAL ORAL
Status: DISCONTINUED | OUTPATIENT
Start: 2025-03-28 | End: 2025-03-28 | Stop reason: HOSPADM

## 2025-03-28 RX ORDER — HYDROMORPHONE HYDROCHLORIDE 1 MG/ML
0.2 INJECTION, SOLUTION INTRAMUSCULAR; INTRAVENOUS; SUBCUTANEOUS
Status: DISCONTINUED | OUTPATIENT
Start: 2025-03-28 | End: 2025-03-28 | Stop reason: HOSPADM

## 2025-03-28 RX ADMIN — CALCIUM CHLORIDE 500 MG: 100 INJECTION, SOLUTION INTRAVENOUS; INTRAVENTRICULAR at 14:47

## 2025-03-28 RX ADMIN — ASPIRIN 81 MG: 81 TABLET, COATED ORAL at 04:58

## 2025-03-28 RX ADMIN — ROCURONIUM BROMIDE 50 MG: 10 INJECTION INTRAVENOUS at 14:33

## 2025-03-28 RX ADMIN — LABETALOL HYDROCHLORIDE 10 MG: 5 INJECTION, SOLUTION INTRAVENOUS at 15:23

## 2025-03-28 RX ADMIN — GLYCOPYRROLATE 0.2 MG: 0.2 INJECTION INTRAMUSCULAR; INTRAVENOUS at 14:33

## 2025-03-28 RX ADMIN — NICOTINE 14 MG: 14 PATCH TRANSDERMAL at 04:57

## 2025-03-28 RX ADMIN — ONDANSETRON 4 MG: 2 INJECTION INTRAMUSCULAR; INTRAVENOUS at 15:12

## 2025-03-28 RX ADMIN — CEFAZOLIN 2 G: 1 INJECTION, POWDER, FOR SOLUTION INTRAMUSCULAR; INTRAVENOUS at 14:34

## 2025-03-28 RX ADMIN — SIMVASTATIN 20 MG: 20 TABLET, FILM COATED ORAL at 20:35

## 2025-03-28 RX ADMIN — AMLODIPINE BESYLATE 10 MG: 10 TABLET ORAL at 04:58

## 2025-03-28 RX ADMIN — DONEPEZIL HYDROCHLORIDE 10 MG: 5 TABLET, FILM COATED ORAL at 20:35

## 2025-03-28 RX ADMIN — CALCIUM CHLORIDE 500 MG: 100 INJECTION, SOLUTION INTRAVENOUS; INTRAVENTRICULAR at 14:44

## 2025-03-28 RX ADMIN — OXYCODONE HYDROCHLORIDE 5 MG: 5 TABLET ORAL at 05:00

## 2025-03-28 RX ADMIN — HEPARIN SODIUM 5000 UNITS: 5000 INJECTION, SOLUTION INTRAVENOUS; SUBCUTANEOUS at 20:35

## 2025-03-28 RX ADMIN — DEXTROSE AND SODIUM CHLORIDE: 5; 450 INJECTION, SOLUTION INTRAVENOUS at 08:39

## 2025-03-28 RX ADMIN — ACETAMINOPHEN 1000 MG: 500 TABLET ORAL at 12:38

## 2025-03-28 RX ADMIN — ACETAMINOPHEN 1000 MG: 500 TABLET ORAL at 20:35

## 2025-03-28 RX ADMIN — DEXAMETHASONE SODIUM PHOSPHATE 8 MG: 4 INJECTION INTRA-ARTICULAR; INTRALESIONAL; INTRAMUSCULAR; INTRAVENOUS; SOFT TISSUE at 14:36

## 2025-03-28 RX ADMIN — PROPOFOL 100 MG: 10 INJECTION, EMULSION INTRAVENOUS at 14:33

## 2025-03-28 RX ADMIN — SODIUM CHLORIDE, POTASSIUM CHLORIDE, SODIUM LACTATE AND CALCIUM CHLORIDE: 600; 310; 30; 20 INJECTION, SOLUTION INTRAVENOUS at 14:11

## 2025-03-28 RX ADMIN — OMEPRAZOLE 40 MG: 20 CAPSULE, DELAYED RELEASE ORAL at 04:58

## 2025-03-28 RX ADMIN — PAROXETINE HYDROCHLORIDE 40 MG: 20 TABLET, FILM COATED ORAL at 04:58

## 2025-03-28 RX ADMIN — DEXTROSE AND SODIUM CHLORIDE: 5; 450 INJECTION, SOLUTION INTRAVENOUS at 19:16

## 2025-03-28 RX ADMIN — TRAMADOL HYDROCHLORIDE 50 MG: 50 TABLET, COATED ORAL at 08:32

## 2025-03-28 RX ADMIN — SUGAMMADEX 200 MG: 100 INJECTION, SOLUTION INTRAVENOUS at 15:12

## 2025-03-28 RX ADMIN — LIDOCAINE HYDROCHLORIDE 40 MG: 20 INJECTION, SOLUTION EPIDURAL; INFILTRATION; INTRACAUDAL; PERINEURAL at 14:33

## 2025-03-28 RX ADMIN — ACETAMINOPHEN 1000 MG: 500 TABLET ORAL at 04:58

## 2025-03-28 ASSESSMENT — PAIN DESCRIPTION - PAIN TYPE
TYPE: SURGICAL PAIN
TYPE: SURGICAL PAIN
TYPE: ACUTE PAIN
TYPE: ACUTE PAIN
TYPE: SURGICAL PAIN
TYPE: SURGICAL PAIN
TYPE: ACUTE PAIN
TYPE: SURGICAL PAIN
TYPE: ACUTE PAIN
TYPE: SURGICAL PAIN

## 2025-03-28 ASSESSMENT — ENCOUNTER SYMPTOMS
SORE THROAT: 0
COUGH: 0
FALLS: 1
SHORTNESS OF BREATH: 0
PALPITATIONS: 0
FEVER: 0
NAUSEA: 0
ABDOMINAL PAIN: 0
DOUBLE VISION: 0
CHILLS: 0
HALLUCINATIONS: 0
VOMITING: 0
BLURRED VISION: 0
HEADACHES: 0

## 2025-03-28 ASSESSMENT — PAIN SCALES - GENERAL: PAIN_LEVEL: 4

## 2025-03-28 ASSESSMENT — PAIN SCALES - WONG BAKER
WONGBAKER_NUMERICALRESPONSE: HURTS A LITTLE MORE
WONGBAKER_NUMERICALRESPONSE: HURTS A LITTLE MORE

## 2025-03-28 NOTE — OR NURSING
Patient resting comfortably, AxOx2, fatigued and sleepy. Dr. Davila at bedside for assessment. Patient's son updated via phone. VSS. Surgical dressing dry and intact with old drainage. Report provided to Emelia Munguia RN. Patient transported off unit with transport on 6L O2 @ 95%. All personal belongings transported with patient.

## 2025-03-28 NOTE — ANESTHESIA PREPROCEDURE EVALUATION
Case: [id number] Date/Time: 03/28/25 1408    Procedure: INSERTION, INTRAMEDULLARY BEVERLEY, FEMUR, PROXIMAL (Right)    Location: [facility] OR  / SURGERY [facility]    Surgeons: Jose Juan Beard M.D.            Relevant Problems   CARDIAC   (positive) Primary hypertension         (positive) ERROL (acute kidney injury) (HCC)   (positive) Stage 3a chronic kidney disease   (positive) Stage 3b chronic kidney disease       Physical Exam    Airway   Mallampati: II  TM distance: >3 FB  Neck ROM: full       Cardiovascular - normal exam  Rhythm: regular  Rate: normal  (-) murmur     Dental - normal exam           Pulmonary - normal exam  Breath sounds clear to auscultation     Abdominal    Neurological - normal exam                   Anesthesia Plan    ASA 3- EMERGENT       Plan - general       Airway plan will be LMA          Induction: intravenous    Postoperative Plan: Postoperative administration of opioids is intended.    Pertinent diagnostic labs and testing reviewed    Informed Consent:    Anesthetic plan and risks discussed with patient.    Use of blood products discussed with: patient whom consented to blood products.

## 2025-03-28 NOTE — ANESTHESIA TIME REPORT
Anesthesia Start and Stop Event Times       Date Time Event    3/28/2025 1351 Ready for Procedure     1411 Anesthesia Start     1529 Anesthesia Stop          Responsible Staff  03/28/25      Name Role Begin End    Keith Davila III, M.D. Anesth 1411 1529          Overtime Reason:  no overtime (within assigned shift)    Comments:

## 2025-03-28 NOTE — ANESTHESIA PROCEDURE NOTES
Peripheral IV    Date/Time: 3/28/2025 2:30 PM    Performed by: Keith Davila III, M.D.  Authorized by: Keith Davila III, M.D.    Size:  20 G (short Jelco)  Laterality:  Left  Peripheral IV Location:  Hand  Local Anesthetic:  Lidocaine 1%  Site Prep:  Alcohol  Technique:  Direct puncture  Attempts:  2   RH attempted x 1 w/o success

## 2025-03-28 NOTE — PROGRESS NOTES
Bedside report received and patient care assumed. Pt is resting in bed, A&O3, with no complaints of pain, and is on 3 LPM . Tele box on. All fall precautions are in place, belongings at bedside table.  Pt was updated on POC, no questions or concerns. Pt educated on use of call light for assistance.

## 2025-03-28 NOTE — ASSESSMENT & PLAN NOTE
Following fall  CT hip - Comminuted right intertrochanteric femur fracture  Consult to ortho  OR today    3/29:   Postop day #1 status post open treatment of right femur fracture  -Pain controlled  -PT OT  -Bowel regimen  -Hemoglobin slight downtrending, repeat CBC in a.m.    3/30: pain controlled, constipated, increase bowel regimen    3/31 continue pain management and encourage mobilization SNF referral discussed with case management  Cleared for discharge by Ortho

## 2025-03-28 NOTE — ANESTHESIA PROCEDURE NOTES
Airway    Date/Time: 3/28/2025 2:33 PM    Performed by: Keith Davila III, M.D.  Authorized by: Keith Davila III, M.D.    Location:  OR  Urgency:  Elective  Difficult Airway: No    Indications for Airway Management:  Anesthesia      Spontaneous Ventilation: absent    Sedation Level:  Deep  Preoxygenated: Yes    Final Airway Type:  Supraglottic airway  Final Supraglottic Airway:  Standard LMA    SGA Size:  3  Number of Attempts at Approach:  1

## 2025-03-28 NOTE — DISCHARGE PLANNING
0957  Agency/Facility Name: [facility]   Spoke To: Leticia   Outcome:DPA called to update Leticia that pt will have a CT scan and possibly have surgery. Leticia will wait to submit insurance auth until there is an update if pt will have surgery or not. SERGEY Trinh notified.

## 2025-03-28 NOTE — CARE PLAN
The patient is Stable - Low risk of patient condition declining or worsening    Shift Goals  Clinical Goals: wean O2 as tolerate, pain control, surgery today, NPO, safety  Patient Goals: pain control, go home  Family Goals: not present      Problem: Pain - Standard  Goal: Alleviation of pain or a reduction in pain to the patient’s comfort goal  Outcome: Progressing     Problem: Knowledge Deficit - Standard  Goal: Patient and family/care givers will demonstrate understanding of plan of care, disease process/condition, diagnostic tests and medications  Outcome: Progressing     Problem: Skin Integrity  Goal: Skin integrity is maintained or improved  Outcome: Progressing     Problem: Fall Risk  Goal: Patient will remain free from falls  Outcome: Progressing

## 2025-03-28 NOTE — CONSULTS
Orthopaedic Surgery Consult    Date: 3/28/2025     History Present Illness    Coty Valdivia 79 y.o. female presenting s/p GLF with R hip pain. Ambulates without assist at baseline. Lives at assisted living facility.    Past Medical History:  Active Ambulatory Problems     Diagnosis Date Noted    Smoking 03/15/2025    Stage 3a chronic kidney disease 03/15/2025    Moderate malnutrition (HCC) 03/15/2025    Right sided weakness 03/15/2025    ACP (advance care planning) 03/15/2025    Mixed hyperlipidemia 03/18/2025     Resolved Ambulatory Problems     Diagnosis Date Noted    Hypertensive emergency 03/15/2025    Chest pain 03/15/2025    Acute ischemic stroke (HCC) 03/17/2025     No Additional Past Medical History       Past Surgical History:  No past surgical history on file.    Medications:  Medications Prior to Admission   Medication Sig Dispense Refill Last Dose/Taking    Amino Acids-Protein Hydrolys (PRO-STAT) Liquid Take 30 mL by mouth 2 times a day.   3/25/2025 at  8:00 PM    Nutritional Supplements (ENSURE COMPLETE) Liquid Take 1 Each by mouth every day.   3/25/2025 at  5:30 PM    acetaminophen (TYLENOL) 325 MG Tab Take 650 mg by mouth every four hours as needed for Mild Pain. 650 mg = 2 tablets   3/25/2025 at  7:49 PM    amLODIPine (NORVASC) 5 MG Tab Take 2 Tablets by mouth every day for 30 days. (Patient taking differently: Take 10 mg by mouth every day. Hold if SBP is less than 110) 60 Tablet 0 3/25/2025 at  7:00 AM    carvedilol (COREG) 6.25 MG Tab Take 1 Tablet by mouth 2 times a day with meals for 30 days. 60 Tablet 0 3/25/2025 at  8:00 PM    aspirin 81 MG EC tablet Take 1 Tablet by mouth every day for 30 days. 30 Tablet 0 3/25/2025 at  7:00 AM    simvastatin (ZOCOR) 20 MG Tab Take 20 mg by mouth every evening. Son does not know when last dose was taken   3/25/2025 at  8:00 PM    PARoxetine (PAXIL) 20 MG Tab Take 40 mg by mouth every day.   3/25/2025 at  7:00 AM    donepezil (ARICEPT) 5 MG Tab Take 10 mg  by mouth every evening. Son does not know when last dose was taken   3/25/2025 at  4:00 PM    omeprazole (PRILOSEC) 20 MG delayed-release capsule Take 40 mg by mouth every day. 40 mg = 2 tablets   3/25/2025 at  7:00 AM    ipratropium-albuterol (COMBIVENT RESPIMAT)  MCG/ACT Aero Soln Inhale 1 Puff 4 times a day.   3/25/2025 at  8:00 PM    ferrous sulfate 325 (65 Fe) MG tablet Take 325 mg by mouth every day. Son does not know when last dose was taken   3/25/2025 at  7:00 AM    alendronate (FOSAMAX) 10 MG Tab Take 10 mg by mouth every morning before breakfast. Son does not know when last dose was taken   3/25/2025 at  5:00 AM     Current Facility-Administered Medications   Medication Dose Route Frequency Provider Last Rate Last Admin    [MAR Hold] traMADol (Ultram) 50 MG tablet 50 mg  50 mg Oral Q6HRS PRN Corey Weston D.O.   50 mg at 03/28/25 0832    [MAR Hold] oxyCODONE immediate-release (Roxicodone) tablet 2.5 mg  2.5 mg Oral Q3HRS PRN ERIN Lyn.O.   2.5 mg at 03/27/25 1413    Or    [MAR Hold] oxyCODONE immediate-release (Roxicodone) tablet 5 mg  5 mg Oral Q3HRS PRN ERIN Lyn.O.   5 mg at 03/28/25 0500    Or    [MAR Hold] morphine 4 MG/ML injection 2 mg  2 mg Intravenous Q3HRS PRN Corey Weston D.O.        [MAR Hold] heparin injection 5,000 Units  5,000 Units Subcutaneous Q8HRS ERIN Lyn.O.   5,000 Units at 03/27/25 1707    D5 1/2 NS infusion   Intravenous Continuous ERIN Lyn.O. 75 mL/hr at 03/28/25 0839 New Bag at 03/28/25 0839    [MAR Hold] hydrALAZINE (Apresoline) injection 10 mg  10 mg Intravenous Q4HRS PRN Ruben Hammonds M.D.        [MAR Hold] aspirin EC tablet 81 mg  81 mg Oral DAILY Ruben Hammonds M.D.   81 mg at 03/28/25 0458    [MAR Hold] amLODIPine (Norvasc) tablet 10 mg  10 mg Oral DAILY Ruben Hammonds M.D.   10 mg at 03/28/25 0458    [MAR Hold] donepezil (Aricept) tablet 10 mg  10 mg Oral Nightly Ruben Hammonds M.D.   10 mg at 03/27/25 2038    [MAR Hold]  omeprazole (PriLOSEC) capsule 40 mg  40 mg Oral DAILY HarjeetLAMINE GarciaD.   40 mg at 03/28/25 0458    [MAR Hold] PARoxetine (Paxil) tablet 40 mg  40 mg Oral DAILY HarjeetLAMINE GarciaD.   40 mg at 03/28/25 0458    [MAR Hold] simvastatin (Zocor) tablet 20 mg  20 mg Oral Nightly EMILY Salas.D.   20 mg at 03/27/25 2038    [MAR Hold] nicotine (Nicoderm) 14 MG/24HR 14 mg  14 mg [facility]   14 mg at 03/28/25 0457    And    [MAR Hold] nicotine polacrilex (Nicorette) 2 MG piece 2 mg  2 mg Oral Q HOUR PRN Ruben Hammonds M.D.        [MAR Hold] Pharmacy Consult Request ...Pain Management Review 1 Each  1 Each Other PHARMACY TO DOSE Geraldene NELLI Tony-Tamiao, A.P.R.N.        [MAR Hold] acetaminophen (Tylenol) tablet 1,000 mg  1,000 mg Oral Q6HRS Geraldene NELLI Tony-Darielauno, A.P.R.N.   1,000 mg at 03/28/25 1238    Followed by    [MAR Hold] acetaminophen (Tylenol) tablet 1,000 mg  1,000 mg Oral Q6HRS PRN Geraldene NELLI Perkinsa-Tamiao, A.P.R.N.           Allergies:  Codeine and Penicillins    Family History:  No family history on file.    Social History:  Social History     Socioeconomic History    Marital status:    Tobacco Use    Smoking status: Every Day     Types: Cigarettes    Smokeless tobacco: Never   Vaping Use    Vaping status: Never Used   Substance and Sexual Activity    Alcohol use: Not Currently    Drug use: Never     Social Drivers of Health     Food Insecurity: No Food Insecurity (3/26/2025)    Hunger Vital Sign     Worried About Running Out of Food in the Last Year: Never true     Ran Out of Food in the Last Year: Never true   Transportation Needs: No Transportation Needs (3/26/2025)    PRAPARE - Transportation     Lack of Transportation (Medical): No     Lack of Transportation (Non-Medical): No   Intimate Partner Violence: Not At Risk (3/26/2025)    Humiliation, Afraid, Rape, and Kick questionnaire     Fear of Current or Ex-Partner: No     Emotionally Abused: No      Physically Abused: No     Sexually Abused: No   Housing Stability: Low Risk  (3/26/2025)    Housing Stability Vital Sign     Unable to Pay for Housing in the Last Year: No     Number of Times Moved in the Last Year: 0     Homeless in the Last Year: No       ROS:  Complete ROS performed. ROS otherwise negative except for pertinent positives, negatives noted above in HPI.    Physical Exam     Vitals:    03/28/25 1109   BP: (!) 143/57   Pulse: 60   Resp: 16   Temp: 36.5 °C (97.7 °F)   SpO2: 93%       Physical Exam  General: NAD    Lungs: No increased work of breathing  Heart: Regular rate by palpation of peripheral pulse    RLE  Limb shortened, flexed, rotated  Skin intact  Pain with manipulation of limb  SILT SP/DP/T/S/S  Motor intact EHL/FHL/TA/GS  foot WWP    Labs, Imaging   Imaging:   CT-HIP W/O PLUS RECONS RIGHT   Final Result      Comminuted right intertrochanteric femur fracture.      CT-HEAD W/O   Final Result      No acute process. Moderate chronic ischemic white matter demyelination.               DX-HIP-UNILATERAL-WITH PELVIS-1 VIEW RIGHT   Final Result      No acute process.      DX-PORTABLE FLUORO > 1 HOUR    (Results Pending)   DX-HIP-UNILATERAL-W/O PELVIS-2/3 VIEWS RIGHT    (Results Pending)       Laboratory Values  Recent Labs     03/26/25  0400 03/27/25  0148 03/28/25  0529   WBC 9.0 7.3 6.9   RBC 3.97* 3.80* 3.77*   HEMOGLOBIN 12.7 12.5 12.2   HEMATOCRIT 38.0 37.6 36.7*   MCV 95.7 98.9* 97.3   MCH 32.0 32.9 32.4   MCHC 33.4 33.2 33.2   RDW 47.1 49.7 48.1   PLATELETCT 170 141* 128*   MPV 10.5 10.7 10.1     Recent Labs     03/26/25  0400 03/27/25  0148 03/28/25  0529   SODIUM 138 140 137   POTASSIUM 4.3 4.3 3.9   CHLORIDE 106 107 107   CO2 20 19* 19*   GLUCOSE 98 94 116*   BUN 35* 42* 38*           Assessment   Coty Valdivia 79 y.o. female presenting with R intertrochanteric femur fracture. Urgent operative intervention recommended.    Plan       Discussed clinical, physical, and imaging findings  with patient and/or POA. Considering the significance of the injury, expected prognosis, and anticipated functional limitations with nonoperative management, the patient was indicated for operative intervention. Surgery consists of IMN R hip. I discussed the clinical, physical, and imaging findings with patient and/or POA and I answered any questions.    Risks, benefits, and alternatives discussed with patient. Risks for surgery may include bleeding, infection, pain, nonunion, malunion, fracture, hardware failure, persistent dysfunction, damage to surrounding neurovascular structures, anesthesia complications, operative failure, stroke, DVT, or death. Patient agrees to proceed with surgery. Informed consent obtained.      Signed:  Jose Juan Beard M.D., MD  3/28/2025 1:51 PM

## 2025-03-28 NOTE — THERAPY
Occupational Therapy Contact Note    Patient Name: Coty Valdivia  Age:  79 y.o., Sex:  female  Medical Record #: [id number]  Today's Date: 3/28/2025     03/28/25 0751   Initial Contact Note    Initial Contact Note Order Received and Verified, Occupational Therapy Evaluation in Progress with Full Report to Follow.   Interdisciplinary Plan of Care Collaboration   IDT Collaboration with    (EMR)   Collaboration Comments PT now s/p CT which revealed R comminuted intertrochanteric femur fracture. Pending ortho consult and NPO for possible surgical intervention. Will continue to hold and follow-up as approproate/able once definitive POC established.   Session Information   Date / Session Number  3/28 Hold - needs eval

## 2025-03-28 NOTE — OP REPORT
Date of Surgery: 3/28/2025    Operating Surgeon:   Primary: Jose Juan Beard M.D.      Preoperative diagnosis:  Right Intertrochanteric Fracture    Postoperative diagnosis:   RightIntertrochanteric Fracture    Procedure:  1.  Open treatment of Right proximal femur fracture intertrochanteric with IMN CPT code  74168    Indications:  Coty Valdivia sustained the above injury and underwent bedside temporizing management. Discussed clinical, physical, and imaging findings with patient and/or POA. Considering the significance of the injury, expected prognosis, and anticipated functional limitations with nonoperative management, the patient was indicated for operative intervention. Surgery consists of Right hip cephalomedullary nail. I discussed the clinical, physical, and imaging findings with patient and/or POA and I answered any questions.    Risks, benefits, and alternatives discussed with patient and/or POA. Risks for surgery may include bleeding, infection, pain, nonunion, malunion, fracture, hardware failure, persistent dysfunction, damage to surrounding neurovascular structures, anesthesia complications, operative failure, stroke, DVT, or death. Patient and/or POA agrees to proceed with surgery. Informed consent obtained.    Description:    Patient was taken to the operating room and placed supine on the OR fracture table. Prepped and drapped in usual sterile fashion. A surgical timeout was performed to verify the patient, laterality of surgery, planned procedure, and receipt of perioperative antibiotics (ancef).    A standard percutaneous approach to the Right  hip was performed. The guide pin was positioned for a trochanteric start point into the proximal femur, and was followed by the entry reamer. Instrumentation was removed. The cephalomedullary melecio was inserted to the appropriate depth to allow lag screw insertion into the femoral head. The fracture and hardware was in good alignment on orthogonal  fluoroscopic views. A 85 mm lag screw was placed into the femoral head and determined to be in acceptable position. Live fluoroscopy was used in multiple planes to verify the screw was contained within the femoral head and did not violate the hip joint. One distal interlock was placed. Final fluoroscopic views were taken and saved.    Wounds were irrigated copiously.  A layered closure was performed to all wounds. Soft dressings were applied.    The patient was awoken from anesthesia and transferred off the operating table without complication.     Implants:[place] 11 x 170mm; 85mm lag screw    Specimens: None    Anesthesia type: laryngeal mask anesthesia    Blood loss: 50 cc    Drains: none    Wound Classification: I, Clean.    Blood Products Administered: none    Postoperative condition: Stable    Postoperative Plan:  - Weightbearing Status: Weightbearing as tolerated   - ROM: As Tolerated  - Antibiotics: Ancef 24 horus  - Pain Control: Per protocol  - DVT Prophylaxis: Per protocol until discharge, ASA 81mg for 4 weeks postoperatively at discharge  - Disposition: to floor, RTC ALEJANDRINA ortho trauma 2-3 weeks with XR R hip

## 2025-03-28 NOTE — ANESTHESIA POSTPROCEDURE EVALUATION
Patient: Coty Valdivia    Procedure Summary       Date: 03/28/25 Room / Location: [place] [id number] / SURGERY [facility]    Anesthesia Start: 1411 Anesthesia Stop: 1529    Procedure: INSERTION, INTRAMEDULLARY BEVERLEY, FEMUR, PROXIMAL (Right: Hip) Diagnosis: (RIGHT INTERTROCHANTERIC FEMUR FRACTURE)    Surgeons: Jose Juan Beard M.D. Responsible Provider: Keith Davila III, M.D.    Anesthesia Type: general ASA Status: 3 - Emergent            Final Anesthesia Type: general  Last vitals  BP   Blood Pressure : 117/58    Temp   35.9 °C (96.7 °F)    Pulse   (!) 53   Resp   12    SpO2   95 %      Anesthesia Post Evaluation    Patient location during evaluation: PACU  Patient participation: complete - patient participated  Level of consciousness: awake and alert  Pain score: 4    Airway patency: patent  Anesthetic complications: no  Cardiovascular status: hemodynamically stable  Respiratory status: acceptable  Hydration status: euvolemic    PONV: none        No notable events documented.     Nurse Pain Score: 4  (Billingsley-Baker Scale)

## 2025-03-28 NOTE — PROGRESS NOTES
Monitor Summary  Rhythm: SB  Rate: 48-61  Ectopy: (R) PVC  Measurements: .18/.10/.42  ---12 hr Chart Review---

## 2025-03-28 NOTE — PROGRESS NOTES
Assumed care of patient and received report from ALEXEY Melton. Assessment completed.Pt A&Ox 4. Respirations are even and unlabored on 3LNC. Pt reports 8/10 hip pain. Medication per MAR. Medical pt, VS stable, call light and belongings are within reach. POC updated. Pt educated on room and call light, pt verbalized understanding. Needs met.

## 2025-03-28 NOTE — CARE PLAN
The patient is Stable - Low risk of patient condition declining or worsening    Shift Goals  Clinical Goals: Safety, wean oxygen, pain control, NPO  Patient Goals: Pain control  Family Goals: NIMA    Progress made toward(s) clinical / shift goals:        Problem: Pain - Standard  Goal: Alleviation of pain or a reduction in pain to the patient’s comfort goal  Outcome: Progressing     Problem: Knowledge Deficit - Standard  Goal: Patient and family/care givers will demonstrate understanding of plan of care, disease process/condition, diagnostic tests and medications  Outcome: Progressing     Problem: Skin Integrity  Goal: Skin integrity is maintained or improved  Outcome: Progressing     Problem: Fall Risk  Goal: Patient will remain free from falls  Outcome: Progressing       Patient is not progressing towards the following goals:

## 2025-03-29 LAB
ALBUMIN SERPL BCP-MCNC: 3.3 G/DL (ref 3.2–4.9)
BUN SERPL-MCNC: 35 MG/DL (ref 8–22)
CALCIUM ALBUM COR SERPL-MCNC: 9.5 MG/DL (ref 8.5–10.5)
CALCIUM SERPL-MCNC: 8.9 MG/DL (ref 8.5–10.5)
CHLORIDE SERPL-SCNC: 108 MMOL/L (ref 96–112)
CO2 SERPL-SCNC: 18 MMOL/L (ref 20–33)
CREAT SERPL-MCNC: 1.59 MG/DL (ref 0.5–1.4)
ERYTHROCYTE [DISTWIDTH] IN BLOOD BY AUTOMATED COUNT: 49 FL (ref 35.9–50)
GFR SERPLBLD CREATININE-BSD FMLA CKD-EPI: 32 ML/MIN/1.73 M 2
GLUCOSE SERPL-MCNC: 178 MG/DL (ref 65–99)
HCT VFR BLD AUTO: 31.5 % (ref 37–47)
HGB BLD-MCNC: 10.5 G/DL (ref 12–16)
MAGNESIUM SERPL-MCNC: 1.9 MG/DL (ref 1.5–2.5)
MCH RBC QN AUTO: 32.6 PG (ref 27–33)
MCHC RBC AUTO-ENTMCNC: 33.3 G/DL (ref 32.2–35.5)
MCV RBC AUTO: 97.8 FL (ref 81.4–97.8)
PHOSPHATE SERPL-MCNC: 3.6 MG/DL (ref 2.5–4.5)
PLATELET # BLD AUTO: 125 K/UL (ref 164–446)
PMV BLD AUTO: 10.6 FL (ref 9–12.9)
POTASSIUM SERPL-SCNC: 4.8 MMOL/L (ref 3.6–5.5)
RBC # BLD AUTO: 3.22 M/UL (ref 4.2–5.4)
SODIUM SERPL-SCNC: 134 MMOL/L (ref 135–145)
WBC # BLD AUTO: 7.8 K/UL (ref 4.8–10.8)

## 2025-03-29 PROCEDURE — 700102 HCHG RX REV CODE 250 W/ 637 OVERRIDE(OP): Performed by: NURSE PRACTITIONER

## 2025-03-29 PROCEDURE — 97535 SELF CARE MNGMENT TRAINING: CPT

## 2025-03-29 PROCEDURE — 97166 OT EVAL MOD COMPLEX 45 MIN: CPT

## 2025-03-29 PROCEDURE — 97162 PT EVAL MOD COMPLEX 30 MIN: CPT

## 2025-03-29 PROCEDURE — 700102 HCHG RX REV CODE 250 W/ 637 OVERRIDE(OP): Performed by: INTERNAL MEDICINE

## 2025-03-29 PROCEDURE — 700111 HCHG RX REV CODE 636 W/ 250 OVERRIDE (IP): Performed by: INTERNAL MEDICINE

## 2025-03-29 PROCEDURE — A9270 NON-COVERED ITEM OR SERVICE: HCPCS | Performed by: STUDENT IN AN ORGANIZED HEALTH CARE EDUCATION/TRAINING PROGRAM

## 2025-03-29 PROCEDURE — 80069 RENAL FUNCTION PANEL: CPT

## 2025-03-29 PROCEDURE — 83735 ASSAY OF MAGNESIUM: CPT

## 2025-03-29 PROCEDURE — 99233 SBSQ HOSP IP/OBS HIGH 50: CPT | Performed by: INTERNAL MEDICINE

## 2025-03-29 PROCEDURE — A9270 NON-COVERED ITEM OR SERVICE: HCPCS | Performed by: NURSE PRACTITIONER

## 2025-03-29 PROCEDURE — 700102 HCHG RX REV CODE 250 W/ 637 OVERRIDE(OP): Performed by: STUDENT IN AN ORGANIZED HEALTH CARE EDUCATION/TRAINING PROGRAM

## 2025-03-29 PROCEDURE — 36415 COLL VENOUS BLD VENIPUNCTURE: CPT

## 2025-03-29 PROCEDURE — 85027 COMPLETE CBC AUTOMATED: CPT

## 2025-03-29 PROCEDURE — 97530 THERAPEUTIC ACTIVITIES: CPT

## 2025-03-29 PROCEDURE — A9270 NON-COVERED ITEM OR SERVICE: HCPCS | Performed by: INTERNAL MEDICINE

## 2025-03-29 PROCEDURE — 770001 HCHG ROOM/CARE - MED/SURG/GYN PRIV*

## 2025-03-29 RX ORDER — POLYETHYLENE GLYCOL 3350 17 G/17G
1 POWDER, FOR SOLUTION ORAL DAILY
Status: DISCONTINUED | OUTPATIENT
Start: 2025-03-30 | End: 2025-04-02 | Stop reason: HOSPADM

## 2025-03-29 RX ORDER — AMOXICILLIN 250 MG
2 CAPSULE ORAL EVERY EVENING
Status: DISCONTINUED | OUTPATIENT
Start: 2025-03-29 | End: 2025-04-02 | Stop reason: HOSPADM

## 2025-03-29 RX ADMIN — TRAMADOL HYDROCHLORIDE 50 MG: 50 TABLET, COATED ORAL at 04:59

## 2025-03-29 RX ADMIN — ASPIRIN 81 MG: 81 TABLET, COATED ORAL at 04:59

## 2025-03-29 RX ADMIN — OXYCODONE HYDROCHLORIDE 5 MG: 5 TABLET ORAL at 09:20

## 2025-03-29 RX ADMIN — OMEPRAZOLE 40 MG: 20 CAPSULE, DELAYED RELEASE ORAL at 04:59

## 2025-03-29 RX ADMIN — ACETAMINOPHEN 1000 MG: 500 TABLET ORAL at 14:18

## 2025-03-29 RX ADMIN — ACETAMINOPHEN 1000 MG: 500 TABLET ORAL at 04:59

## 2025-03-29 RX ADMIN — OXYCODONE HYDROCHLORIDE 5 MG: 5 TABLET ORAL at 16:44

## 2025-03-29 RX ADMIN — HEPARIN SODIUM 5000 UNITS: 5000 INJECTION, SOLUTION INTRAVENOUS; SUBCUTANEOUS at 23:31

## 2025-03-29 RX ADMIN — SIMVASTATIN 20 MG: 20 TABLET, FILM COATED ORAL at 20:13

## 2025-03-29 RX ADMIN — TRAMADOL HYDROCHLORIDE 50 MG: 50 TABLET, COATED ORAL at 15:17

## 2025-03-29 RX ADMIN — PAROXETINE HYDROCHLORIDE 40 MG: 20 TABLET, FILM COATED ORAL at 04:59

## 2025-03-29 RX ADMIN — HEPARIN SODIUM 5000 UNITS: 5000 INJECTION, SOLUTION INTRAVENOUS; SUBCUTANEOUS at 04:58

## 2025-03-29 RX ADMIN — NICOTINE 14 MG: 14 PATCH TRANSDERMAL at 04:59

## 2025-03-29 RX ADMIN — ACETAMINOPHEN 1000 MG: 500 TABLET ORAL at 23:31

## 2025-03-29 RX ADMIN — AMLODIPINE BESYLATE 10 MG: 10 TABLET ORAL at 04:59

## 2025-03-29 RX ADMIN — SENNOSIDES AND DOCUSATE SODIUM 2 TABLET: 50; 8.6 TABLET ORAL at 18:09

## 2025-03-29 RX ADMIN — OXYCODONE HYDROCHLORIDE 5 MG: 5 TABLET ORAL at 23:31

## 2025-03-29 RX ADMIN — HEPARIN SODIUM 5000 UNITS: 5000 INJECTION, SOLUTION INTRAVENOUS; SUBCUTANEOUS at 14:18

## 2025-03-29 RX ADMIN — OXYCODONE HYDROCHLORIDE 5 MG: 5 TABLET ORAL at 20:13

## 2025-03-29 RX ADMIN — DONEPEZIL HYDROCHLORIDE 10 MG: 5 TABLET, FILM COATED ORAL at 20:13

## 2025-03-29 ASSESSMENT — COGNITIVE AND FUNCTIONAL STATUS - GENERAL
DAILY ACTIVITIY SCORE: 18
MOVING FROM LYING ON BACK TO SITTING ON SIDE OF FLAT BED: A LITTLE
HELP NEEDED FOR BATHING: A LOT
CLIMB 3 TO 5 STEPS WITH RAILING: TOTAL
SUGGESTED CMS G CODE MODIFIER MOBILITY: CL
MOVING TO AND FROM BED TO CHAIR: A LOT
WALKING IN HOSPITAL ROOM: A LOT
DRESSING REGULAR UPPER BODY CLOTHING: A LITTLE
STANDING UP FROM CHAIR USING ARMS: A LITTLE
MOBILITY SCORE: 14
TOILETING: A LOT
DRESSING REGULAR LOWER BODY CLOTHING: A LITTLE
TURNING FROM BACK TO SIDE WHILE IN FLAT BAD: A LITTLE
SUGGESTED CMS G CODE MODIFIER DAILY ACTIVITY: CK

## 2025-03-29 ASSESSMENT — ACTIVITIES OF DAILY LIVING (ADL): TOILETING: INDEPENDENT

## 2025-03-29 ASSESSMENT — ENCOUNTER SYMPTOMS
SHORTNESS OF BREATH: 0
NAUSEA: 0
DIARRHEA: 0
FEVER: 0
ABDOMINAL PAIN: 0
CHILLS: 0
VOMITING: 0

## 2025-03-29 ASSESSMENT — GAIT ASSESSMENTS
DISTANCE (FEET): 2
ASSISTIVE DEVICE: FRONT WHEEL WALKER
DISTANCE (FEET): 2
GAIT LEVEL OF ASSIST: MODERATE ASSIST
DEVIATION: ANTALGIC;STEP TO;BRADYKINETIC

## 2025-03-29 ASSESSMENT — PAIN DESCRIPTION - PAIN TYPE
TYPE: SURGICAL PAIN;ACUTE PAIN
TYPE: ACUTE PAIN
TYPE: ACUTE PAIN;SURGICAL PAIN
TYPE: ACUTE PAIN
TYPE: ACUTE PAIN

## 2025-03-29 ASSESSMENT — FIBROSIS 4 INDEX: FIB4 SCORE: 3.27

## 2025-03-29 NOTE — PROGRESS NOTES
Bedside shift report received from iliana crouch. Pt alert and orientedx3, restless in bed, post op vitals, on D5 1/2 normal at 75ml/h, 7L oximask, surgical site clean dry and intact. Call bell within reach, bed alarm set.

## 2025-03-29 NOTE — THERAPY
"Physical Therapy   Initial Evaluation     Patient Name: Coty Valdivia  Age:  79 y.o., Sex:  female  Medical Record #: [id number]  Today's Date: 3/29/2025     Precautions  Precautions: Fall Risk;Weight Bearing As Tolerated Right Lower Extremity  Comments: R hip fx, IMN    Assessment  Patient is an 79 y.o. female who was admitted after a fall at [facility] resulting in a comminuted R intertrochanteric fx. Pt is now s/p IMN. Pt also dx with symptomatic bradycardia and ERROL. 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    Pt received in bed and seen for PT evaluation. Pt pre-medicated after discussion with nursing and seen in coordination with OT due to expected need for two person assist, although pt did better than anticipated. Pt presents with impaired functional mobility secondary to pain and associated weakness following hip fx. Pt required min A for bed mobility, scooting, and STS. Once in standing, pt unable to tolerate much weight on her RLE and required mod A for support to offload while taking steps. Pt provided with facilitation and cues throughout time mobilizing. Anticipate pt will continue to require post-acute placement for further rehab. Will follow for acute PT.    Plan    Physical Therapy Initial Treatment Plan   Treatment Plan : Bed Mobility, Gait Training, Neuro Re-Education / Balance, Self Care / Home Evaluation, Stair Training, Therapeutic Activities, Therapeutic Exercise  Treatment Frequency: 5 Times per Week  Duration: Until Therapy Goals Met    DC Equipment Recommendations: Unable to determine at this time  Discharge Recommendations: Recommend post-acute placement for additional physical therapy services prior to discharge home     Subjective    \"You know, I've been moved so many times that I'm not quite sure where I am now\"     Objective       03/29/25 1117   Precautions   Precautions Fall Risk;Weight Bearing As " Tolerated Right Lower Extremity   Comments R hip fx, IMN   Vitals   Pulse 61   Pulse Oximetry 94 %   O2 (LPM) 5   O2 Delivery Device Silicone Nasal Cannula   Vitals Comments c/o dizziness in sitting, BP stable 120's systolic   Pain 0 - 10 Group   Therapist Pain Assessment Post Activity Pain Same as Prior to Activity;Nurse Notified  (R hip pain, mostly with weight bearing)   Prior Living Situation   Housing / Facility Mobile Home   Steps Into Home 5   Steps In Home 0   Equipment Owned None   Lives with - Patient's Self Care Capacity Adult Children   Comments Pt typically lives with her son and was independent until recent CVA 3/15/25. Pt was at [facility] from 3/20-3/26, which is where she sustained the fall.   Prior Level of Functional Mobility   Comments Independent prior to CVA. Walking 60ft with PT at prior admission.   History of Falls   History of Falls Yes   Date of Last Fall   (reason for admission)   Cognition    Level of Consciousness Alert   Comments Pleasant and cooperative. Oriented to self and time. Not oriented to place or current reason. Follows commands appropriately.   Passive ROM Lower Body   Passive ROM Lower Body WDL   Active ROM Lower Body    Active ROM Lower Body  WDL   Comments Uses UE to assist RLE with movement, but can bring up to place on bed for donning socks   Strength Lower Body   Comments LLE grossly 4/5, RLE 2+ to 3-/5 and pain limited   Sensation Lower Body   Comments Denies LE sensory changes   Lower Body Muscle Tone   Lower Body Muscle Tone  WDL   Coordination Lower Body    Coordination Lower Body  WDL   Balance Assessment   Sitting Balance (Static) Fair   Sitting Balance (Dynamic) Fair   Standing Balance (Static) Fair -   Standing Balance (Dynamic) Poor +   Weight Shift Sitting Fair   Weight Shift Standing Poor   Comments FWW in standing, minimal WB on RLE due to pain   Bed Mobility    Supine to Sit Minimal Assist   Scooting Minimal Assist   Comments HOB 20 deg, assist for RLE  to sitting and HHA for leverage with scooting   Gait Analysis   Gait Level Of Assist Moderate Assist   Assistive Device Front Wheel Walker   Distance (Feet) 2   # of Times Distance was Traveled 1   Deviation Antalgic;Step To;Bradykinetic   Comments Unable to tolerate RLE weight bearing for further gait   Functional Mobility   Sit to Stand Minimal Assist   Bed, Chair, Wheelchair Transfer Moderate Assist   Transfer Method Stand Step   Mobility bed>chair   Comments Provided with cues and facilitation for STS and transfer. Assisted in offloading RLE during stance on that leg   6 Clicks Assessment - How much HELP from from another person do you currently need... (If the patient hasn't done an activity recently, how much help from another person do you think he/she would need if he/she tried?)   Turning from your back to your side while in a flat bed without using bedrails? 3   Moving from lying on your back to sitting on the side of a flat bed without using bedrails? 3   Moving to and from a bed to a chair (including a wheelchair)? 2   Standing up from a chair using your arms (e.g., wheelchair, or bedside chair)? 3   Walking in hospital room? 2   Climbing 3-5 steps with a railing? 1   6 clicks Mobility Score 14   Short Term Goals    Short Term Goal # 1 Pt will complete bed mobility with supervision from a flat bed to progress function in 6 visits.   Short Term Goal # 2 Pt will transfer with FWW and supervision to progress function in 6 visits.   Short Term Goal # 3 Pt will ambulate 50ft with FWW and supervision to progress function in 6 visits.   Education Group   Education Provided Role of Physical Therapist   Role of Physical Therapist Patient Response Patient;Acceptance;Explanation;Verbal Demonstration   Physical Therapy Initial Treatment Plan    Treatment Plan  Bed Mobility;Gait Training;Neuro Re-Education / Balance;Self Care / Home Evaluation;Stair Training;Therapeutic Activities;Therapeutic Exercise   Treatment  Frequency 5 Times per Week   Duration Until Therapy Goals Met   Problem List    Problems Pain;Impaired Bed Mobility;Impaired Transfers;Impaired Ambulation;Functional Strength Deficit;Impaired Balance;Decreased Activity Tolerance;Safety Awareness Deficits / Cognition   Anticipated Discharge Equipment and Recommendations   DC Equipment Recommendations Unable to determine at this time   Discharge Recommendations Recommend post-acute placement for additional physical therapy services prior to discharge home   Interdisciplinary Plan of Care Collaboration   IDT Collaboration with  Nursing;Occupational Therapist   Patient Position at End of Therapy Seated;Chair Alarm On;Call Light within Reach;Tray Table within Reach;Phone within Reach   Collaboration Comments RN updated   Session Information   Date / Session Number  3/29-1 (1/5, 4/4) hip fx   Priority 4  (hip fx)

## 2025-03-29 NOTE — HOSPITAL COURSE
Coty Valdivia is a 79 y.o. female who presented 3/26/2025 with fall and hip pain.  PMH of hypertension, osteoporosis, GERD, dyslipidemia, depression, CKD.  Recent admission to this facility 3/15 - 3/20 and was diagnosed with an acute stroke and hypertensive emergency.  She was discharged to rehab and started on carvedilol and amlodipine.  She had a fall today and is complaining of severe right hip pain so she was brought into the ED.  Symptomatic bradycardia due to beta blocker.  Rate initially in the 40s which has improved to the 50s/60s with beta-blocker washout.  Further imaging of hip including CT demonstrated a femoral fracture.  Orthopedic surgery consulted and plan for OR on 3/28.

## 2025-03-29 NOTE — PROGRESS NOTES
"      Orthopaedic Progress Note    Interval changes:  Patient doing well post op  RLE dressings are CDI  Cleared for DC to SNF by ortho pending medicine clearance    ROS - Patient denies any new issues.  Pain well controlled.    BP (!) 144/55   Pulse (!) 57   Temp 36.8 °C (98.2 °F) (Temporal)   Resp 16   Ht 1.57 m (5' 1.81\")   Wt 43.7 kg (96 lb 5.5 oz)   SpO2 97%     Patient seen and examined  No acute distress  Breathing non labored  RRR  RLE dressings CDI, DNVI, moves all toes, cap refill <2 sec.     Recent Labs     03/27/25  0148 03/28/25  0529 03/29/25  0141   WBC 7.3 6.9 7.8   RBC 3.80* 3.77* 3.22*   HEMOGLOBIN 12.5 12.2 10.5*   HEMATOCRIT 37.6 36.7* 31.5*   MCV 98.9* 97.3 97.8   MCH 32.9 32.4 32.6   MCHC 33.2 33.2 33.3   RDW 49.7 48.1 49.0   PLATELETCT 141* 128* 125*   MPV 10.7 10.1 10.6       Active Hospital Problems    Diagnosis     ERROL (acute kidney injury) (HCC) [N17.9]     Moderate protein-calorie malnutrition (HCC) [E44.0]     Closed comminuted intertrochanteric fracture of proximal end of right femur (HCC) [S72.141A]     Symptomatic bradycardia [R00.1]     Stage 3b chronic kidney disease [N18.32]     Primary hypertension [I10]     Fall [W19.XXXA]     Mixed hyperlipidemia [E78.2]     Smoking [F17.200]     ACP (advance care planning) [Z71.89]        Assessment/Plan:  Patient doing well post op  RLE dressings are CDI  Cleared for DC to SNF by ortho pending medicine clearance  POD#1 S/P Open treatment of Right proximal femur fracture intertrochanteric with IMN    Wt bearing status - WBAT  Wound care/Drains - Dressings to be changed every other day by nursing. Or PRN for saturation    Future Procedures - none   Sutures/Staples out- 14-21 days post operatively. Removal by ortho mid levels only.  PT/OT-initiated  Antibiotics: Perioperative completed  DVT Prophylaxis- TEDS/SCDs/Foot pumps/heparin  [place]-not needed per ortho  Case Coordination for Discharge Planning - Disposition per therapy recs.    "

## 2025-03-29 NOTE — PROGRESS NOTES
Pt arrived back onto unit from PACU. Assessment completed. 2 RN skin check completed. Post op vitals initiated. Pt resting comfortable. Patient verbalizes only mild pain. Pt reoriented to room and call light. Needs met.

## 2025-03-29 NOTE — PROGRESS NOTES
[facility] Medicine Daily Progress Note    Date of Service  3/29/2025    Chief Complaint  Coty Valdivia is a 79 y.o. female admitted 3/26/2025 with symptomatic bradycardia, fall    Hospital Course  Coty Valdivia is a 79 y.o. female who presented 3/26/2025 with fall and hip pain.  PMH of hypertension, osteoporosis, GERD, dyslipidemia, depression, CKD.  Recent admission to this facility 3/15 - 3/20 and was diagnosed with an acute stroke and hypertensive emergency.  She was discharged to rehab and started on carvedilol and amlodipine.  She had a fall today and is complaining of severe right hip pain so she was brought into the ED.  Symptomatic bradycardia due to beta blocker.  Rate initially in the 40s which has improved to the 50s/60s with beta-blocker washout.  Further imaging of hip including CT demonstrated a femoral fracture.  Orthopedic surgery consulted and plan for OR on 3/28.    Interval Problem Update  Patient seen and examined at bedside    -Postop day #1  -Pain controlled  -Creatinine improving 2-->1.5  - H/H slight downtrend 12-->10    I have discussed this patient's plan of care and discharge plan at IDT rounds today with Case Management, Nursing, Nursing leadership, and other members of the IDT team.      Code Status  Full Code    Disposition  The patient is not medically cleared for discharge to home or a post-acute facility.  Anticipate discharge to: skilled nursing facility    I have placed the appropriate orders for post-discharge needs.    Review of Systems  Review of Systems   Constitutional:  Negative for chills and fever.   Respiratory:  Negative for shortness of breath.    Cardiovascular:  Negative for chest pain.   Gastrointestinal:  Negative for abdominal pain, diarrhea, nausea and vomiting.   Musculoskeletal:  Positive for joint pain.        Physical Exam  Temp:  [35.9 °C (96.7 °F)-36.6 °C (97.9 °F)] 36.5 °C (97.7 °F)  Pulse:  [53-67] 58  Resp:  [9-19] 18  BP: (104-171)/()  119/46  SpO2:  [82 %-99 %] 96 %    Physical Exam  Vitals reviewed.   Constitutional:       General: She is not in acute distress.     Appearance: She is not toxic-appearing or diaphoretic.   HENT:      Head: Normocephalic and atraumatic.      Nose: Nose normal.      Mouth/Throat:      Mouth: Mucous membranes are moist.   Eyes:      General: No scleral icterus.     Conjunctiva/sclera: Conjunctivae normal.   Cardiovascular:      Rate and Rhythm: Normal rate and regular rhythm.      Heart sounds: No murmur heard.     No friction rub. No gallop.   Pulmonary:      Effort: Pulmonary effort is normal.      Breath sounds: Rales present. No wheezing or rhonchi.   Abdominal:      General: Bowel sounds are normal. There is no distension.      Palpations: Abdomen is soft.      Tenderness: There is no abdominal tenderness.   Musculoskeletal:      Right lower leg: No edema.      Left lower leg: No edema.      Comments: R thigh dressing with some dried blood and bruising around   Skin:     Findings: Bruising present.   Neurological:      Mental Status: She is alert and oriented to person, place, and time.   Psychiatric:         Mood and Affect: Mood normal.         Behavior: Behavior normal.         Fluids    Intake/Output Summary (Last 24 hours) at 3/29/2025 1311  Last data filed at 3/29/2025 0804  Gross per 24 hour   Intake 1000 ml   Output 50 ml   Net 950 ml        Laboratory  Recent Labs     03/27/25 0148 03/28/25  0529 03/29/25  0141   WBC 7.3 6.9 7.8   RBC 3.80* 3.77* 3.22*   HEMOGLOBIN 12.5 12.2 10.5*   HEMATOCRIT 37.6 36.7* 31.5*   MCV 98.9* 97.3 97.8   MCH 32.9 32.4 32.6   MCHC 33.2 33.2 33.3   RDW 49.7 48.1 49.0   PLATELETCT 141* 128* 125*   MPV 10.7 10.1 10.6     Recent Labs     03/27/25  0148 03/28/25  0529 03/29/25  0141   SODIUM 140 137 134*   POTASSIUM 4.3 3.9 4.8   CHLORIDE 107 107 108   CO2 19* 19* 18*   GLUCOSE 94 116* 178*   BUN 42* 38* 35*   CREATININE 2.10* 2.02* 1.59*   CALCIUM 8.4* 7.8* 8.9                    Imaging  DX-HIP-UNILATERAL-W/O PELVIS-2/3 VIEWS RIGHT   Final Result      Digitized intraoperative radiograph is submitted for review.  This examination is not for diagnostic purpose but for guidance during a surgical procedure. Please see the patient's chart for full procedural details.      CT-HIP W/O PLUS RECONS RIGHT   Final Result      Comminuted right intertrochanteric femur fracture.      CT-HEAD W/O   Final Result      No acute process. Moderate chronic ischemic white matter demyelination.               DX-HIP-UNILATERAL-WITH PELVIS-1 VIEW RIGHT   Final Result      No acute process.      DX-PORTABLE FLUORO > 1 HOUR    (Results Pending)        Assessment/Plan  * Closed comminuted intertrochanteric fracture of proximal end of right femur (HCC)  Assessment & Plan  Following fall  CT hip - Comminuted right intertrochanteric femur fracture  Consult to ortho  OR today    3/29:   Postop day #1 status post open treatment of right femur fracture  -Pain controlled  -PT OT  -Bowel regimen  -Hemoglobin slight downtrending, repeat CBC in a.m.    Moderate protein-calorie malnutrition (HCC)  Assessment & Plan  Dietary consult    ERROL (acute kidney injury) (HCC)  Assessment & Plan  Cr 1.54 --> 2.10 --> 2.02  Stop NSAID's, lovenox transitioned to heparin for dvt ppx  Possibly sequelae of medications vs hypoperfusion due to bradycardia  Monitor    3/29  Creatinine improving, repeat BMP in a.m.    Fall- (present on admission)  Assessment & Plan  See symptomatic bradycardia above  Complaining of right hip pain, x-ray reviewed and shows no acute bony abnormality  PT, OT    Primary hypertension- (present on admission)  Assessment & Plan  Continue amlodipine, discontinue carvedilol due to presentation of symptomatic bradycardia  Consider adding hydralazine if BP continues to increase  Not a candidate for initiating ARB at this time    Stage 3b chronic kidney disease- (present on admission)  Assessment & Plan  Suspect this is  her baseline creatinine.  BMP ordered continue monitoring, avoid nephrotoxic agents    3/29  - Cr improving 2-->1.5 (baseline ~1.4)    Symptomatic bradycardia- (present on admission)  Assessment & Plan  Presents with a fall preceded by dizziness.  Found to be bradycardic in the 40s, was started on carvedilol following last admission for hypertensive emergency and acute stroke earlier this month  Discontinue carvedilol, will continue monitoring on telemetry    Allowing for appropriate beta blocker washout, HR improving into the 50 -60's      Mixed hyperlipidemia- (present on admission)  Assessment & Plan  Continue statin    ACP (advance care planning)- (present on admission)  Assessment & Plan  I spent 17 minutes at bedside with patient discussing work-up, results, diagnosis, prognosis.  CODE STATUS discussed with patient and wants to be full code.      Smoking- (present on admission)  Assessment & Plan  Patient smokes 1 PPD.  Nicotine patch and/or gum ordered.   I discussed cessation with patient including starting on nicotine patch and/or gum on discharge.  I also discussed medications to help with cessation with patient including Wellbutrin and Chantix, declined.  Smoking cessation discussed with patient for 4 minutes.           VTE prophylaxis:    heparin ppx      I have performed a physical exam and reviewed and updated ROS and Plan today (3/29/2025). In review of yesterday's note (3/28/2025), there are no changes except as documented above.    Greater than 52 minutes spent preparing to see patient (e.g. review of tests) obtaining and/or reviewing separately obtained history. Performing a medically appropriate examination and/ evaluation.  Counseling and educating the patient/family/caregiver.  Ordering medications, tests, or procedures.  Referring and communicating with other health care professionals.  Documenting clinical information in EPIC.  Independently interpreting results and communicating results to  patient/family/caregiver.  Care coordination.

## 2025-03-29 NOTE — THERAPY
"Occupational Therapy   Initial Evaluation     Patient Name: Coty Valdivia  Age:  79 y.o., Sex:  female  Medical Record #: [id number]  Today's Date: 3/29/2025     Precautions: Fall Risk, Weight Bearing As Tolerated Right Lower Extremity  Comments: R hip fx, IMN    Assessment    Patient is 79 y.o. female with h/o COPD, HTN, HLD, CKD, CVA on 3/15 with subsequent transfer to SNF, now admitted following GLF at SNF resulting in R IT fx. Pt underwent ORIF on 3/28. Pt seen for OT eval and treatment. See grid below for details including tx specifics in Education Group. Pt mildly confused/disoriented, but able to follow and participate. Transferred to chair and engaged in LB dressing and light grooming. Pt is below functional baseline from OT standpoint and will benefit from ongong acute OT to maximize functional independence and safety.     Plan    Occupational Therapy Initial Treatment Plan   Treatment Interventions: Self Care / Activities of Daily Living, Adaptive Equipment, Neuro Re-Education / Balance, Therapeutic Exercises, Cognitive Skill Development, Therapeutic Activity, Family / Caregiver Training  Treatment Frequency: 4 Times per Week  Duration: Until Therapy Goals Met    DC Equipment Recommendations: Unable to determine at this time  Discharge Recommendations: Recommend post-acute placement for additional occupational therapy services prior to discharge home     Subjective    \"I'm dizzy.\" (EOB; SBP stable)     Objective       03/29/25 1116   Prior Living Situation   Prior Services Other (Comments)  (assist with I-ADL)   Housing / Facility Mobile Home   Steps Into Home 4   Steps In Home 0   Bathroom Set up Bathtub / Shower Combination   Equipment Owned Front-Wheel Walker;Single Point Cane;Tub / Shower Seat;Oxygen  (per pt, O2 PRN)   Lives with - Patient's Self Care Capacity Adult Children   Comments Pt lives with son, but was admitted from SNF where she had been since recent CVA. Son works outside home.   Prior " Level of ADL Function   Self Feeding Independent   Grooming / Hygiene Independent   Bathing Independent   Dressing Independent   Toileting Independent   Comments functional levels prior to recent CVA   Prior Level of IADL Function   Medication Management Requires Assist   Laundry Requires Assist   Finances Requires Assist   Home Management Requires Assist   Shopping Requires Assist   Prior Level Of Mobility Independent With Device in Home;Supervision With Device in Community  (intermittent use of FWW or SPC)   Driving / Transportation Relatives / Others Provide Transportation   Comments functional levels prior to recent CVA   History of Falls   History of Falls Yes   Precautions   Precautions Fall Risk;Weight Bearing As Tolerated Right Lower Extremity   Vitals   Pulse (!) 58   Patient BP Position Sitting   Blood Pressure  123/46   O2 (LPM) 4   O2 Delivery Device Silicone Nasal Cannula   Vitals Comments mild dizziness EOB   Pain   Pain Scales 0 to 10 Scale    Pain 0 - 10 Group   Location Hip   Location Orientation Right   Therapist Pain Assessment During Activity;Nurse Notified  (minimal c/o, not quantified; agreeable to activity)   Non Verbal Descriptors   Non Verbal Scale  Calm;Unlabored Breathing   Cognition    Cognition / Consciousness X   Speech/ Communication Hard of Hearing   Orientation Level Not Oriented to Place;Not Oriented to Reason;Not Oriented to Time;Not Oriented to Day   Level of Consciousness Alert   New Learning Impaired   Comments mild confusion, but very pleasant & participatory   Active ROM Upper Body   Active ROM Upper Body  WDL   Dominant Hand Right   Strength Upper Body   Upper Body Strength  WDL   Sensation Upper Body   Upper Extremity Sensation  WDL   Comments denies sensory changes   Upper Body Muscle Tone   Upper Body Muscle Tone  WDL   Coordination Upper Body   Coordination WDL   Comments for BADL   Balance Assessment   Sitting Balance (Static) Fair   Sitting Balance (Dynamic) Fair  "  Standing Balance (Static) Fair -   Standing Balance (Dynamic) Poor +   Weight Shift Sitting Fair   Weight Shift Standing Fair   Comments FWW for standing   Bed Mobility    Supine to Sit Minimal Assist  (HOB elevated)   Sit to Supine   (up to chair post)   Scooting Supervised  (seated)   ADL Assessment   Grooming Supervision;Seated  (facial hygiene; declined oral care)   Lower Body Dressing Minimal Assist  (don B socks)   Toileting   (declined need, using PureWick)   Functional Mobility   Sit to Stand Minimal Assist   Bed, Chair, Wheelchair Transfer Moderate Assist   Transfer Method Stand Step  (FWW)   Mobility bed mobility, few steps to chair   Distance (Feet) 2   # of Times Distance was Traveled 1   Visual Perception   Comments wears glasses   Edema / Skin Assessment   Edema / Skin  Not Assessed   Activity Tolerance   Sitting in Chair >10 min; up post   Sitting Edge of Bed 6 min   Standing 2-3 min   Comments limited by pain, weakness   Patient / Family Goals   Patient / Family Goal #1 \"Get back to normal\"   Short Term Goals   Short Term Goal # 1 Pt will complete ADL/toilet transfers with SBA   Short Term Goal # 2 Pt will complete toileting with SBA   Short Term Goal # 3 Pt will complete LB dressing with SBA   Short Term Goal # 4 Pt will complete standing grooming with SBA   Education Group   Education Provided Role of Occupational Therapist;Weight Bearing Precautions;Transfers;Use of Call Light;Pathology of bedrest   Role of Occupational Therapist Patient Response Patient;Acceptance;Explanation;Verbal Demonstration   Transfers Patient Response Patient;Acceptance;Explanation;Demonstration;Action Demonstration;Verbal Demonstration;Reinforcement Needed  (pivot using FWW)   Use of Call Light Patient Response Patient;Acceptance;Explanation;Demonstration;Verbal Demonstration;Action Demonstration   Weight Bearing Precautions Patient Response Patient;Acceptance;Explanation;Demonstration;Verbal Demonstration;Action " Demonstration  (WBAT RLE)   Pathology of Bedrest Patient Response Patient;Acceptance;Explanation;Verbal Demonstration;Reinforcement Needed   Occupational Therapy Initial Treatment Plan    Treatment Interventions Self Care / Activities of Daily Living;Adaptive Equipment;Neuro Re-Education / Balance;Therapeutic Exercises;Cognitive Skill Development;Therapeutic Activity;Family / Caregiver Training   Treatment Frequency 4 Times per Week   Duration Until Therapy Goals Met   Problem List   Problem List Decreased Homemaking Skills;Decreased Active Daily Living Skills;Decreased Functional Mobility;Decreased Activity Tolerance;Impaired Postural Control / Balance;Limited Knowledge of Post Op Precautions;Impaired Cognitive Function   Interdisciplinary Plan of Care Collaboration   IDT Collaboration with  Nursing;Physical Therapist   Patient Position at End of Therapy Seated;Chair Alarm On;Call Light within Reach;Tray Table within Reach;Phone within Reach   Collaboration Comments OT results and recs   Session Information   Date / Session Number  3/29 #1 (1/4, 4/4)     Patient seen for team evaluation with Physical Therapist for the following reason(s):  Patient required 2 person assistance for safety and to provide effective interventions. Each discipline assisted patient with appropriate and separate goals.

## 2025-03-29 NOTE — CARE PLAN
The patient is Watcher - Medium risk of patient condition declining or worsening    Shift Goals  Clinical Goals: wean O2 as tolerate, pain control, surgery today, NPO, safety  Patient Goals: pain control, go home  Family Goals: not present    Progress made toward(s) clinical / shift goals:          Problem: Pain - Standard  Goal: Alleviation of pain or a reduction in pain to the patient’s comfort goal  3/29/2025 0403 by Keeley Tello R.N.  Outcome: Progressing  3/29/2025 0353 by Keeley Tello, R.N.  Outcome: Progressing     Problem: Skin Integrity  Goal: Skin integrity is maintained or improved  3/29/2025 0403 by Keeley Tello, R.N.  Outcome: Progressing  3/29/2025 0353 by Keeley Tello, R.N.  Outcome: Progressing     Problem: Fall Risk  Goal: Patient will remain free from falls  3/29/2025 0403 by Keeley Tello, R.N.  Outcome: Progressing  3/29/2025 0353 by Keeley Tello, R.N.  Outcome: Progressing

## 2025-03-29 NOTE — PROGRESS NOTES
[facility] Medicine Daily Progress Note    Date of Service  3/28/2025    Chief Complaint  Coty Valdivia is a 79 y.o. female admitted 3/26/2025 with symptomatic bradycardia, fall    Hospital Course  Coty Valdivia is a 79 y.o. female who presented 3/26/2025 with fall and hip pain.  PMH of hypertension, osteoporosis, GERD, dyslipidemia, depression, CKD.  Recent admission to this facility 3/15 - 3/20 and was diagnosed with an acute stroke and hypertensive emergency.  She was discharged to rehab and started on carvedilol and amlodipine.  She had a fall today and is complaining of severe right hip pain so she was brought into the ED.  Symptomatic bradycardia due to beta blocker.  Rate initially in the 40s which has improved to the 50s/60s with beta-blocker washout.  Further imaging of hip including CT demonstrated a femoral fracture.  Orthopedic surgery consulted and plan for OR on 3/28.    Interval Problem Update  Patient was seen and examined at bedside.  No acute events overnight. Patient is resting comfortably in bed and in no acute distress.     Cr 1.54 --> 2.10 --> 2.02  CT hip - Comminuted right intertrochanteric femur fracture   Plan for OR today    I have discussed this patient's plan of care and discharge plan at IDT rounds today with Case Management, Nursing, Nursing leadership, and other members of the IDT team.      Code Status  Full Code    Disposition  The patient is not medically cleared for discharge to home or a post-acute facility.      I have placed the appropriate orders for post-discharge needs.    Review of Systems  Review of Systems   Constitutional:  Negative for chills and fever.   HENT:  Negative for congestion and sore throat.    Eyes:  Negative for blurred vision and double vision.   Respiratory:  Negative for cough and shortness of breath.    Cardiovascular:  Negative for chest pain and palpitations.   Gastrointestinal:  Negative for abdominal pain, nausea and vomiting.   Genitourinary:   Negative for dysuria and frequency.   Musculoskeletal:  Positive for falls and joint pain.   Skin:  Negative for rash.   Neurological:  Negative for headaches.   Psychiatric/Behavioral:  Negative for hallucinations.         Physical Exam  Temp:  [35.9 °C (96.7 °F)-37 °C (98.6 °F)] 35.9 °C (96.7 °F)  Pulse:  [53-60] 53  Resp:  [9-17] 12  BP: (117-171)/(50-74) 117/58  SpO2:  [93 %-98 %] 95 %    Physical Exam  Vitals reviewed.   Constitutional:       Appearance: She is obese.      Comments: Frail in appearance   HENT:      Nose: No congestion.      Mouth/Throat:      Pharynx: No oropharyngeal exudate.   Eyes:      General: No scleral icterus.     Pupils: Pupils are equal, round, and reactive to light.   Cardiovascular:      Rate and Rhythm: Regular rhythm. Bradycardia present.   Pulmonary:      Breath sounds: No wheezing.   Abdominal:      Tenderness: There is no abdominal tenderness. There is no guarding or rebound.   Musculoskeletal:      Comments: Limited AROM right hip  Pain with internal rotation of right leg   Skin:     Coloration: Skin is not jaundiced.      Findings: No bruising.   Neurological:      General: No focal deficit present.      Mental Status: She is alert.      Motor: No weakness.   Psychiatric:         Mood and Affect: Mood normal.         Behavior: Behavior normal.         Fluids    Intake/Output Summary (Last 24 hours) at 3/28/2025 1719  Last data filed at 3/28/2025 1654  Gross per 24 hour   Intake 900 ml   Output 150 ml   Net 750 ml        Laboratory  Recent Labs     03/26/25  0400 03/27/25  0148 03/28/25  0529   WBC 9.0 7.3 6.9   RBC 3.97* 3.80* 3.77*   HEMOGLOBIN 12.7 12.5 12.2   HEMATOCRIT 38.0 37.6 36.7*   MCV 95.7 98.9* 97.3   MCH 32.0 32.9 32.4   MCHC 33.4 33.2 33.2   RDW 47.1 49.7 48.1   PLATELETCT 170 141* 128*   MPV 10.5 10.7 10.1     Recent Labs     03/26/25  0400 03/27/25  0148 03/28/25  0529   SODIUM 138 140 137   POTASSIUM 4.3 4.3 3.9   CHLORIDE 106 107 107   CO2 20 19* 19*    GLUCOSE 98 94 116*   BUN 35* 42* 38*   CREATININE 1.54* 2.10* 2.02*   CALCIUM 8.7 8.4* 7.8*                   Imaging  DX-HIP-UNILATERAL-W/O PELVIS-2/3 VIEWS RIGHT   Final Result      Digitized intraoperative radiograph is submitted for review.  This examination is not for diagnostic purpose but for guidance during a surgical procedure. Please see the patient's chart for full procedural details.      CT-HIP W/O PLUS RECONS RIGHT   Final Result      Comminuted right intertrochanteric femur fracture.      CT-HEAD W/O   Final Result      No acute process. Moderate chronic ischemic white matter demyelination.               DX-HIP-UNILATERAL-WITH PELVIS-1 VIEW RIGHT   Final Result      No acute process.      DX-PORTABLE FLUORO > 1 HOUR    (Results Pending)        Assessment/Plan  * Closed comminuted intertrochanteric fracture of proximal end of right femur (HCC)  Assessment & Plan  Following fall  CT hip - Comminuted right intertrochanteric femur fracture  Consult to ortho  OR today    ERROL (acute kidney injury) ([facility])  Assessment & Plan  Cr 1.54 --> 2.10 --> 2.02  Stop NSAID's, lovenox transitioned to heparin for dvt ppx  Possibly sequelae of medications vs hypoperfusion due to bradycardia  monitor    Symptomatic bradycardia- (present on admission)  Assessment & Plan  Presents with a fall preceded by dizziness.  Found to be bradycardic in the 40s, was started on carvedilol following last admission for hypertensive emergency and acute stroke earlier this month  Discontinue carvedilol, will continue monitoring on telemetry    Allowing for appropriate beta blocker washout, HR improving into the 50 -60's      Primary hypertension- (present on admission)  Assessment & Plan  Continue amlodipine, discontinue carvedilol due to presentation of symptomatic bradycardia  Consider adding hydralazine if BP continues to increase  Not a candidate for initiating ARB at this time    Stage 3b chronic kidney disease- (present on  admission)  Assessment & Plan  Suspect this is her baseline creatinine.  BMP ordered continue monitoring, avoid nephrotoxic agents    Moderate protein-calorie malnutrition (HCC)  Assessment & Plan  Dietary consult    Fall- (present on admission)  Assessment & Plan  See symptomatic bradycardia above  Complaining of right hip pain, x-ray reviewed and shows no acute bony abnormality  PT, OT    Mixed hyperlipidemia- (present on admission)  Assessment & Plan  Continue statin    ACP (advance care planning)- (present on admission)  Assessment & Plan  I spent 17 minutes at bedside with patient discussing work-up, results, diagnosis, prognosis.  CODE STATUS discussed with patient and wants to be full code.      Smoking- (present on admission)  Assessment & Plan  Patient smokes 1 PPD.  Nicotine patch and/or gum ordered.   I discussed cessation with patient including starting on nicotine patch and/or gum on discharge.  I also discussed medications to help with cessation with patient including Wellbutrin and Chantix, declined.  Smoking cessation discussed with patient for 4 minutes.           VTE prophylaxis: VTE Selection    I have performed a physical exam and reviewed and updated ROS and Plan today (3/28/2025). In review of yesterday's note (3/27/2025), there are no changes except as documented above.    Greater than 55 minutes spent prepping to see patient (e.g. review of tests) obtaining and/or reviewing separately obtained history. Performing a medically appropriate examination and/ evaluation.  Counseling and educating the patient/family/caregiver.  Ordering medications, tests, or procedures.  Referring and communicating with other health care professionals.  Documenting clinical information in EPIC.  Independently interpreting results and communicating results to patient/family/caregiver.  Care coordination.

## 2025-03-29 NOTE — CARE PLAN
The patient is Watcher - Medium risk of patient condition declining or worsening    Shift Goals  Clinical Goals: wean O2 as tolerate, pain control, surgery today, NPO, safety  Patient Goals: pain control, go home  Family Goals: not present    Progress made toward(s) clinical / shift goals:

## 2025-03-29 NOTE — PROGRESS NOTES
4 Eyes Skin Assessment Completed by ALEXEY Kapoor and ALEXEY Neal.    Head WDL  Ears WDL  Nose WDL  Mouth WDL  Neck WDL  Breast/Chest WDL  Shoulder Blades Redness and Blanching  Spine Redness and Blanching  (R) Arm/Elbow/Hand Bruising  (L) Arm/Elbow/Hand Bruising  Abdomen Bruising  Groin Redness  Scrotum/Coccyx/Buttocks Redness and Blanching  (R) Leg Bruising  (L) Leg Bruising  (R) Heel/Foot/Toe Redness and Blanching  (L) Heel/Foot/Toe Redness and Blanching          Devices In Places Tele Box, Blood Pressure Cuff, Pulse Ox, and Oxy Mask      Interventions In Place Pillows and Q2 Turns    Possible Skin Injury No    Pictures Uploaded Into Epic N/A  Wound Consult Placed N/A  RN Wound Prevention Protocol Ordered Yes

## 2025-03-30 PROBLEM — D53.9 MACROCYTIC ANEMIA: Status: ACTIVE | Noted: 2025-03-30

## 2025-03-30 LAB
ANION GAP SERPL CALC-SCNC: 11 MMOL/L (ref 7–16)
BUN SERPL-MCNC: 35 MG/DL (ref 8–22)
CALCIUM SERPL-MCNC: 8.6 MG/DL (ref 8.5–10.5)
CHLORIDE SERPL-SCNC: 110 MMOL/L (ref 96–112)
CO2 SERPL-SCNC: 19 MMOL/L (ref 20–33)
CREAT SERPL-MCNC: 1.56 MG/DL (ref 0.5–1.4)
ERYTHROCYTE [DISTWIDTH] IN BLOOD BY AUTOMATED COUNT: 50.4 FL (ref 35.9–50)
GFR SERPLBLD CREATININE-BSD FMLA CKD-EPI: 33 ML/MIN/1.73 M 2
GLUCOSE SERPL-MCNC: 101 MG/DL (ref 65–99)
HCT VFR BLD AUTO: 27.1 % (ref 37–47)
HGB BLD-MCNC: 9 G/DL (ref 12–16)
MCH RBC QN AUTO: 32.7 PG (ref 27–33)
MCHC RBC AUTO-ENTMCNC: 33.2 G/DL (ref 32.2–35.5)
MCV RBC AUTO: 98.5 FL (ref 81.4–97.8)
PLATELET # BLD AUTO: 142 K/UL (ref 164–446)
PMV BLD AUTO: 10.9 FL (ref 9–12.9)
POTASSIUM SERPL-SCNC: 4.9 MMOL/L (ref 3.6–5.5)
RBC # BLD AUTO: 2.75 M/UL (ref 4.2–5.4)
SODIUM SERPL-SCNC: 140 MMOL/L (ref 135–145)
WBC # BLD AUTO: 9.1 K/UL (ref 4.8–10.8)

## 2025-03-30 PROCEDURE — A9270 NON-COVERED ITEM OR SERVICE: HCPCS | Performed by: INTERNAL MEDICINE

## 2025-03-30 PROCEDURE — 85027 COMPLETE CBC AUTOMATED: CPT

## 2025-03-30 PROCEDURE — A9270 NON-COVERED ITEM OR SERVICE: HCPCS | Performed by: NURSE PRACTITIONER

## 2025-03-30 PROCEDURE — 700102 HCHG RX REV CODE 250 W/ 637 OVERRIDE(OP): Performed by: NURSE PRACTITIONER

## 2025-03-30 PROCEDURE — A9270 NON-COVERED ITEM OR SERVICE: HCPCS | Performed by: STUDENT IN AN ORGANIZED HEALTH CARE EDUCATION/TRAINING PROGRAM

## 2025-03-30 PROCEDURE — 36415 COLL VENOUS BLD VENIPUNCTURE: CPT

## 2025-03-30 PROCEDURE — 770001 HCHG ROOM/CARE - MED/SURG/GYN PRIV*

## 2025-03-30 PROCEDURE — 700102 HCHG RX REV CODE 250 W/ 637 OVERRIDE(OP): Performed by: STUDENT IN AN ORGANIZED HEALTH CARE EDUCATION/TRAINING PROGRAM

## 2025-03-30 PROCEDURE — 700111 HCHG RX REV CODE 636 W/ 250 OVERRIDE (IP): Performed by: INTERNAL MEDICINE

## 2025-03-30 PROCEDURE — 80048 BASIC METABOLIC PNL TOTAL CA: CPT

## 2025-03-30 PROCEDURE — 700102 HCHG RX REV CODE 250 W/ 637 OVERRIDE(OP): Performed by: INTERNAL MEDICINE

## 2025-03-30 PROCEDURE — 99232 SBSQ HOSP IP/OBS MODERATE 35: CPT | Performed by: INTERNAL MEDICINE

## 2025-03-30 RX ORDER — BISACODYL 10 MG
10 SUPPOSITORY, RECTAL RECTAL DAILY
Status: DISCONTINUED | OUTPATIENT
Start: 2025-03-30 | End: 2025-04-02 | Stop reason: HOSPADM

## 2025-03-30 RX ADMIN — ACETAMINOPHEN 1000 MG: 500 TABLET ORAL at 05:21

## 2025-03-30 RX ADMIN — AMLODIPINE BESYLATE 10 MG: 10 TABLET ORAL at 05:21

## 2025-03-30 RX ADMIN — MORPHINE SULFATE 2 MG: 4 INJECTION, SOLUTION INTRAMUSCULAR; INTRAVENOUS at 02:21

## 2025-03-30 RX ADMIN — ACETAMINOPHEN 1000 MG: 500 TABLET ORAL at 17:35

## 2025-03-30 RX ADMIN — HEPARIN SODIUM 5000 UNITS: 5000 INJECTION, SOLUTION INTRAVENOUS; SUBCUTANEOUS at 05:19

## 2025-03-30 RX ADMIN — NICOTINE 14 MG: 14 PATCH TRANSDERMAL at 05:22

## 2025-03-30 RX ADMIN — OXYCODONE HYDROCHLORIDE 5 MG: 5 TABLET ORAL at 05:20

## 2025-03-30 RX ADMIN — SIMVASTATIN 20 MG: 20 TABLET, FILM COATED ORAL at 22:11

## 2025-03-30 RX ADMIN — ACETAMINOPHEN 1000 MG: 500 TABLET ORAL at 23:16

## 2025-03-30 RX ADMIN — ACETAMINOPHEN 1000 MG: 500 TABLET ORAL at 13:19

## 2025-03-30 RX ADMIN — OXYCODONE HYDROCHLORIDE 5 MG: 5 TABLET ORAL at 22:11

## 2025-03-30 RX ADMIN — MAGNESIUM HYDROXIDE 30 ML: 2400 SUSPENSION ORAL at 17:34

## 2025-03-30 RX ADMIN — BISACODYL 10 MG: 10 SUPPOSITORY RECTAL at 22:11

## 2025-03-30 RX ADMIN — POLYETHYLENE GLYCOL 3350 1 PACKET: 17 POWDER, FOR SOLUTION ORAL at 05:20

## 2025-03-30 RX ADMIN — DONEPEZIL HYDROCHLORIDE 10 MG: 5 TABLET, FILM COATED ORAL at 22:11

## 2025-03-30 RX ADMIN — ASPIRIN 81 MG: 81 TABLET, COATED ORAL at 05:20

## 2025-03-30 RX ADMIN — OMEPRAZOLE 40 MG: 20 CAPSULE, DELAYED RELEASE ORAL at 05:20

## 2025-03-30 RX ADMIN — PAROXETINE HYDROCHLORIDE 40 MG: 20 TABLET, FILM COATED ORAL at 05:21

## 2025-03-30 RX ADMIN — SENNOSIDES AND DOCUSATE SODIUM 2 TABLET: 50; 8.6 TABLET ORAL at 17:35

## 2025-03-30 RX ADMIN — MORPHINE SULFATE 2 MG: 4 INJECTION, SOLUTION INTRAMUSCULAR; INTRAVENOUS at 23:14

## 2025-03-30 RX ADMIN — TRAMADOL HYDROCHLORIDE 50 MG: 50 TABLET, COATED ORAL at 17:35

## 2025-03-30 RX ADMIN — OXYCODONE HYDROCHLORIDE 5 MG: 5 TABLET ORAL at 13:19

## 2025-03-30 ASSESSMENT — ENCOUNTER SYMPTOMS
DIARRHEA: 0
VOMITING: 0
SHORTNESS OF BREATH: 0
CONSTIPATION: 1
ABDOMINAL PAIN: 0
FEVER: 0
CHILLS: 0
NAUSEA: 0

## 2025-03-30 ASSESSMENT — PAIN DESCRIPTION - PAIN TYPE
TYPE: ACUTE PAIN
TYPE: ACUTE PAIN;SURGICAL PAIN

## 2025-03-30 ASSESSMENT — PATIENT HEALTH QUESTIONNAIRE - PHQ9
2. FEELING DOWN, DEPRESSED, IRRITABLE, OR HOPELESS: NOT AT ALL
SUM OF ALL RESPONSES TO PHQ9 QUESTIONS 1 AND 2: 0
1. LITTLE INTEREST OR PLEASURE IN DOING THINGS: NOT AT ALL

## 2025-03-30 ASSESSMENT — FIBROSIS 4 INDEX: FIB4 SCORE: 2.88

## 2025-03-30 NOTE — DISCHARGE PLANNING
0856  Agency/Facility Name: [place]  Outcome: DPA LVM inquiring bed availability for weekend admission.  DPA to follow up     [de-identified] : 23-year-old female presents with right foot pain after a fall.  This occurred on 5/5/2024.  Patient was seen at [facility].  X-rays obtained and she was told she had a fracture of her foot was placed in a splint  Today patient states she continues have pain in her right foot.  She has been nonweightbearing since injury.  Denies any numbness or tingling.  States there has been swelling in her right foot.  5/17/2024: Patient here to review her CT scanning results.  6/11/2024: Pain has been improving.  8/2/24- Pain improved, able to ambulate without assitive device, some anterior lateral foot pain

## 2025-03-30 NOTE — PROGRESS NOTES
[facility] Medicine Daily Progress Note    Date of Service  3/30/2025    Chief Complaint  Coty Valdivia is a 79 y.o. female admitted 3/26/2025 with symptomatic bradycardia, fall    Hospital Course  Coty Valdivia is a 79 y.o. female who presented 3/26/2025 with fall and hip pain.  PMH of hypertension, osteoporosis, GERD, dyslipidemia, depression, CKD.  Recent admission to this facility 3/15 - 3/20 and was diagnosed with an acute stroke and hypertensive emergency.  She was discharged to rehab and started on carvedilol and amlodipine.  She had a fall today and is complaining of severe right hip pain so she was brought into the ED.  Symptomatic bradycardia due to beta blocker.  Rate initially in the 40s which has improved to the 50s/60s with beta-blocker washout.  Further imaging of hip including CT demonstrated a femoral fracture.  Orthopedic surgery consulted and plan for OR on 3/28.    Interval Problem Update  Patient seen and examined at bedside    -Postop day #2  - constipated  - H/H downtrending, holding VTE prophylaxis  - daughter at bedside, gave updates and answered questions      I have discussed this patient's plan of care and discharge plan at IDT rounds today with Case Management, Nursing, Nursing leadership, and other members of the IDT team.      Code Status  Full Code    Disposition  The patient is not medically cleared for discharge to home or a post-acute facility.  Anticipate discharge to: skilled nursing facility    I have placed the appropriate orders for post-discharge needs.    Review of Systems  Review of Systems   Constitutional:  Negative for chills and fever.   Respiratory:  Negative for shortness of breath.    Cardiovascular:  Negative for chest pain.   Gastrointestinal:  Positive for constipation. Negative for abdominal pain, diarrhea, nausea and vomiting.   Musculoskeletal:  Positive for joint pain.        Physical Exam  Temp:  [36.4 °C (97.5 °F)-36.8 °C (98.2 °F)] 36.5 °C (97.7  °F)  Pulse:  [55-60] 58  Resp:  [16-20] 18  BP: (118-158)/(43-67) 135/48  SpO2:  [90 %-97 %] 90 %    Physical Exam  Vitals reviewed.   Constitutional:       General: She is not in acute distress.     Appearance: She is not toxic-appearing or diaphoretic.   HENT:      Head: Normocephalic and atraumatic.      Nose: Nose normal.      Mouth/Throat:      Mouth: Mucous membranes are moist.   Eyes:      General: No scleral icterus.     Conjunctiva/sclera: Conjunctivae normal.   Cardiovascular:      Rate and Rhythm: Normal rate and regular rhythm.      Heart sounds: No murmur heard.     No friction rub. No gallop.   Pulmonary:      Effort: Pulmonary effort is normal.      Breath sounds: Rales present. No wheezing or rhonchi.   Abdominal:      General: Bowel sounds are normal. There is no distension.      Palpations: Abdomen is soft.      Tenderness: There is no abdominal tenderness.   Musculoskeletal:      Right lower leg: No edema.      Left lower leg: No edema.      Comments: R thigh dressing with some dried blood and bruising around   Skin:     Findings: Bruising present.   Neurological:      Mental Status: She is alert and oriented to person, place, and time.   Psychiatric:         Mood and Affect: Mood normal.         Behavior: Behavior normal.         Fluids    Intake/Output Summary (Last 24 hours) at 3/30/2025 1431  Last data filed at 3/30/2025 0800  Gross per 24 hour   Intake 600 ml   Output 2000 ml   Net -1400 ml        Laboratory  Recent Labs     03/28/25  0529 03/29/25  0141 03/30/25  0029   WBC 6.9 7.8 9.1   RBC 3.77* 3.22* 2.75*   HEMOGLOBIN 12.2 10.5* 9.0*   HEMATOCRIT 36.7* 31.5* 27.1*   MCV 97.3 97.8 98.5*   MCH 32.4 32.6 32.7   MCHC 33.2 33.3 33.2   RDW 48.1 49.0 50.4*   PLATELETCT 128* 125* 142*   MPV 10.1 10.6 10.9     Recent Labs     03/28/25  0529 03/29/25  0141 03/30/25  0029   SODIUM 137 134* 140   POTASSIUM 3.9 4.8 4.9   CHLORIDE 107 108 110   CO2 19* 18* 19*   GLUCOSE 116* 178* 101*   BUN 38* 35*  35*   CREATININE 2.02* 1.59* 1.56*   CALCIUM 7.8* 8.9 8.6                   Imaging  DX-HIP-UNILATERAL-W/O PELVIS-2/3 VIEWS RIGHT   Final Result      Digitized intraoperative radiograph is submitted for review.  This examination is not for diagnostic purpose but for guidance during a surgical procedure. Please see the patient's chart for full procedural details.      CT-HIP W/O PLUS RECONS RIGHT   Final Result      Comminuted right intertrochanteric femur fracture.      CT-HEAD W/O   Final Result      No acute process. Moderate chronic ischemic white matter demyelination.               DX-HIP-UNILATERAL-WITH PELVIS-1 VIEW RIGHT   Final Result      No acute process.      DX-PORTABLE FLUORO > 1 HOUR    (Results Pending)        Assessment/Plan  * Closed comminuted intertrochanteric fracture of proximal end of right femur (HCC)  Assessment & Plan  Following fall  CT hip - Comminuted right intertrochanteric femur fracture  Consult to ortho  OR today    3/29:   Postop day #1 status post open treatment of right femur fracture  -Pain controlled  -PT OT  -Bowel regimen  -Hemoglobin slight downtrending, repeat CBC in a.m.    3/30: pain controlled, constipated, increase bowel regimen    Macrocytic anemia  Assessment & Plan  H/H downtrending 12-->10-->9  Did have some slight bruising along dressing but no fresh bleeding or evidence of hematoma  We will hold DVT prophylaxis for now  Repeat CBC in a.m.    Moderate protein-calorie malnutrition (HCC)  Assessment & Plan  Dietary consult    ERROL (acute kidney injury) ([facility])  Assessment & Plan  Cr 1.54 --> 2.10 --> 2.02  Stop NSAID's, lovenox transitioned to heparin for dvt ppx  Possibly sequelae of medications vs hypoperfusion due to bradycardia  Monitor    3/29  Creatinine improving, repeat BMP in a.m.    Fall- (present on admission)  Assessment & Plan  See symptomatic bradycardia above  Complaining of right hip pain, x-ray reviewed and shows no acute bony abnormality  PT,  OT    Primary hypertension- (present on admission)  Assessment & Plan  Continue amlodipine, discontinue carvedilol due to presentation of symptomatic bradycardia  Consider adding hydralazine if BP continues to increase  Not a candidate for initiating ARB at this time    Stage 3b chronic kidney disease- (present on admission)  Assessment & Plan  Suspect this is her baseline creatinine.  BMP ordered continue monitoring, avoid nephrotoxic agents    3/29  - Cr improving 2-->1.5 (baseline ~1.4)    3/30: Cr stable 1.5-->1.5, likely new baseline  - repeat BMP in am    Symptomatic bradycardia- (present on admission)  Assessment & Plan  Presents with a fall preceded by dizziness.  Found to be bradycardic in the 40s, was started on carvedilol following last admission for hypertensive emergency and acute stroke earlier this month  Discontinue carvedilol, will continue monitoring on telemetry    Allowing for appropriate beta blocker washout, HR improving into the 50 -60's      Mixed hyperlipidemia- (present on admission)  Assessment & Plan  Continue statin    ACP (advance care planning)- (present on admission)  Assessment & Plan  I spent 17 minutes at bedside with patient discussing work-up, results, diagnosis, prognosis.  CODE STATUS discussed with patient and wants to be full code.      Smoking- (present on admission)  Assessment & Plan  Patient smokes 1 PPD.  Nicotine patch and/or gum ordered.   I discussed cessation with patient including starting on nicotine patch and/or gum on discharge.  I also discussed medications to help with cessation with patient including Wellbutrin and Chantix, declined.  Smoking cessation discussed with patient for 4 minutes.           VTE prophylaxis:   SCDs/TEDs      I have performed a physical exam and reviewed and updated ROS and Plan today (3/30/2025). In review of yesterday's note (3/29/2025), there are no changes except as documented above.

## 2025-03-30 NOTE — ASSESSMENT & PLAN NOTE
With component of acute blood loss anemia postoperatively    No clinical signs of active bleeding at this time hemoglobin stable overnight

## 2025-03-30 NOTE — CARE PLAN
The patient is Watcher - Medium risk of patient condition declining or worsening    Shift Goals  Clinical Goals: safety, stability  Patient Goals: pain control  Family Goals: updates    Progress made toward(s) clinical / shift goals:    Problem: Pain - Standard  Goal: Alleviation of pain or a reduction in pain to the patient’s comfort goal  Outcome: Progressing     Problem: Knowledge Deficit - Standard  Goal: Patient and family/care givers will demonstrate understanding of plan of care, disease process/condition, diagnostic tests and medications  Outcome: Progressing     Problem: Fall Risk  Goal: Patient will remain free from falls  Outcome: Progressing       Patient is not progressing towards the following goals:

## 2025-03-30 NOTE — DISCHARGE PLANNING
Case Management Discharge Planning        Anticipated Discharge Dispo: Discharge Disposition: D/T to SNF with Medicare cert in anticipation of skilled care (03)    Action(s) Taken: Chart review completed. Patient discussed during am rounds.     Per provider, patient may be MC to discharge back to [facility]; RNCM requested DPA place TC to [facility] to inquire regarding bed availability    Per DPA, VM left for [facility]; awaiting return call     Her/She

## 2025-03-30 NOTE — CARE PLAN
The patient is Stable - Low risk of patient condition declining or worsening    Shift Goals  Clinical Goals: pain management, q2 turns  Patient Goals: pain management, rest  Family Goals: updates    Progress made toward(s) clinical / shift goals:    Problem: Knowledge Deficit - Standard  Goal: Patient and family/care givers will demonstrate understanding of plan of care, disease process/condition, diagnostic tests and medications  Outcome: Progressing     Problem: Skin Integrity  Goal: Skin integrity is maintained or improved  Outcome: Progressing     Problem: Fall Risk  Goal: Patient will remain free from falls  Outcome: Progressing     Problem: Wound/ / Incision Healing  Goal: Patient's wound/surgical incision will decrease in size and heals properly  Outcome: Progressing       Patient is not progressing towards the following goals:      Problem: Pain - Standard  Goal: Alleviation of pain or a reduction in pain to the patient’s comfort goal  Outcome: Not Progressing

## 2025-03-30 NOTE — PROGRESS NOTES
Report received. Pt care assumed. No signs of distress at this time. Pt on 5L oxygen, c/o 9/10 pain. Bed locked in lowest position, bed alarm on, call light within reach.

## 2025-03-30 NOTE — PROGRESS NOTES
"      Orthopaedic Progress Note    Interval changes:  Patient doing well    RLE dressings are CDI  Cleared for DC to SNF by ortho pending medicine clearance    ROS - Patient denies any new issues.  Pain well controlled.    /48   Pulse (!) 58   Temp 36.5 °C (97.7 °F) (Temporal)   Resp 18   Ht 1.57 m (5' 1.81\")   Wt 48.2 kg (106 lb 4.2 oz)   SpO2 90%     Patient seen and examined  No acute distress  Breathing non labored  RRR  RLE dressings CDI, DNVI, moves all toes, cap refill <2 sec.     Recent Labs     03/28/25  0529 03/29/25  0141 03/30/25  0029   WBC 6.9 7.8 9.1   RBC 3.77* 3.22* 2.75*   HEMOGLOBIN 12.2 10.5* 9.0*   HEMATOCRIT 36.7* 31.5* 27.1*   MCV 97.3 97.8 98.5*   MCH 32.4 32.6 32.7   MCHC 33.2 33.3 33.2   RDW 48.1 49.0 50.4*   PLATELETCT 128* 125* 142*   MPV 10.1 10.6 10.9       Active Hospital Problems    Diagnosis     Macrocytic anemia [D53.9]     ERROL (acute kidney injury) (HCC) [N17.9]     Moderate protein-calorie malnutrition (HCC) [E44.0]     Closed comminuted intertrochanteric fracture of proximal end of right femur (HCC) [S72.141A]     Symptomatic bradycardia [R00.1]     Stage 3b chronic kidney disease [N18.32]     Primary hypertension [I10]     Fall [W19.XXXA]     Mixed hyperlipidemia [E78.2]     Smoking [F17.200]     ACP (advance care planning) [Z71.89]        Assessment/Plan:  Patient doing well   RLE dressings are CDI  Cleared for DC to SNF by ortho pending medicine clearance  POD#2 S/P Open treatment of Right proximal femur fracture intertrochanteric with IMN    Wt bearing status - WBAT  Wound care/Drains - Dressings to be changed every other day by nursing. Or PRN for saturation    Future Procedures - none   Sutures/Staples out- 14-21 days post operatively. Removal by ortho mid levels only.  PT/OT-initiated  Antibiotics: Perioperative completed  DVT Prophylaxis- TEDS/SCDs/Foot pumps/heparin  [place]-not needed per ortho  Case Coordination for Discharge Planning - Disposition per " therapy recs.

## 2025-03-31 VITALS
HEART RATE: 65 BPM | DIASTOLIC BLOOD PRESSURE: 94 MMHG | WEIGHT: 106.26 LBS | HEIGHT: 62 IN | SYSTOLIC BLOOD PRESSURE: 143 MMHG | TEMPERATURE: 98.1 F | BODY MASS INDEX: 19.55 KG/M2 | OXYGEN SATURATION: 95 % | RESPIRATION RATE: 16 BRPM

## 2025-03-31 LAB
ANION GAP SERPL CALC-SCNC: 9 MMOL/L (ref 7–16)
BUN SERPL-MCNC: 44 MG/DL (ref 8–22)
CALCIUM SERPL-MCNC: 8.8 MG/DL (ref 8.5–10.5)
CHLORIDE SERPL-SCNC: 107 MMOL/L (ref 96–112)
CO2 SERPL-SCNC: 22 MMOL/L (ref 20–33)
CREAT SERPL-MCNC: 1.63 MG/DL (ref 0.5–1.4)
ERYTHROCYTE [DISTWIDTH] IN BLOOD BY AUTOMATED COUNT: 49.3 FL (ref 35.9–50)
GFR SERPLBLD CREATININE-BSD FMLA CKD-EPI: 32 ML/MIN/1.73 M 2
GLUCOSE SERPL-MCNC: 101 MG/DL (ref 65–99)
HCT VFR BLD AUTO: 26.7 % (ref 37–47)
HGB BLD-MCNC: 9.1 G/DL (ref 12–16)
MCH RBC QN AUTO: 32.9 PG (ref 27–33)
MCHC RBC AUTO-ENTMCNC: 34.1 G/DL (ref 32.2–35.5)
MCV RBC AUTO: 96.4 FL (ref 81.4–97.8)
PLATELET # BLD AUTO: 165 K/UL (ref 164–446)
PMV BLD AUTO: 10.2 FL (ref 9–12.9)
POTASSIUM SERPL-SCNC: 5 MMOL/L (ref 3.6–5.5)
RBC # BLD AUTO: 2.77 M/UL (ref 4.2–5.4)
SODIUM SERPL-SCNC: 138 MMOL/L (ref 135–145)
WBC # BLD AUTO: 6.6 K/UL (ref 4.8–10.8)

## 2025-03-31 PROCEDURE — 700102 HCHG RX REV CODE 250 W/ 637 OVERRIDE(OP): Performed by: STUDENT IN AN ORGANIZED HEALTH CARE EDUCATION/TRAINING PROGRAM

## 2025-03-31 PROCEDURE — 99232 SBSQ HOSP IP/OBS MODERATE 35: CPT | Performed by: HOSPITALIST

## 2025-03-31 PROCEDURE — 97530 THERAPEUTIC ACTIVITIES: CPT

## 2025-03-31 PROCEDURE — A9270 NON-COVERED ITEM OR SERVICE: HCPCS | Performed by: STUDENT IN AN ORGANIZED HEALTH CARE EDUCATION/TRAINING PROGRAM

## 2025-03-31 PROCEDURE — 770001 HCHG ROOM/CARE - MED/SURG/GYN PRIV*

## 2025-03-31 PROCEDURE — A9270 NON-COVERED ITEM OR SERVICE: HCPCS | Performed by: NURSE PRACTITIONER

## 2025-03-31 PROCEDURE — 80048 BASIC METABOLIC PNL TOTAL CA: CPT

## 2025-03-31 PROCEDURE — A9270 NON-COVERED ITEM OR SERVICE: HCPCS | Performed by: INTERNAL MEDICINE

## 2025-03-31 PROCEDURE — 36415 COLL VENOUS BLD VENIPUNCTURE: CPT

## 2025-03-31 PROCEDURE — 85027 COMPLETE CBC AUTOMATED: CPT

## 2025-03-31 PROCEDURE — 700102 HCHG RX REV CODE 250 W/ 637 OVERRIDE(OP): Performed by: NURSE PRACTITIONER

## 2025-03-31 PROCEDURE — 700102 HCHG RX REV CODE 250 W/ 637 OVERRIDE(OP): Performed by: INTERNAL MEDICINE

## 2025-03-31 PROCEDURE — 700111 HCHG RX REV CODE 636 W/ 250 OVERRIDE (IP): Performed by: HOSPITALIST

## 2025-03-31 RX ADMIN — HEPARIN SODIUM 5000 UNITS: 5000 INJECTION, SOLUTION INTRAVENOUS; SUBCUTANEOUS at 16:55

## 2025-03-31 RX ADMIN — POLYETHYLENE GLYCOL 3350 1 PACKET: 17 POWDER, FOR SOLUTION ORAL at 04:31

## 2025-03-31 RX ADMIN — OMEPRAZOLE 40 MG: 20 CAPSULE, DELAYED RELEASE ORAL at 04:32

## 2025-03-31 RX ADMIN — NICOTINE 14 MG: 14 PATCH TRANSDERMAL at 04:37

## 2025-03-31 RX ADMIN — DONEPEZIL HYDROCHLORIDE 10 MG: 5 TABLET, FILM COATED ORAL at 22:26

## 2025-03-31 RX ADMIN — HEPARIN SODIUM 5000 UNITS: 5000 INJECTION, SOLUTION INTRAVENOUS; SUBCUTANEOUS at 22:26

## 2025-03-31 RX ADMIN — PAROXETINE HYDROCHLORIDE 40 MG: 20 TABLET, FILM COATED ORAL at 04:33

## 2025-03-31 RX ADMIN — ACETAMINOPHEN 1000 MG: 500 TABLET ORAL at 04:32

## 2025-03-31 RX ADMIN — ASPIRIN 81 MG: 81 TABLET, COATED ORAL at 04:33

## 2025-03-31 RX ADMIN — AMLODIPINE BESYLATE 10 MG: 10 TABLET ORAL at 04:33

## 2025-03-31 RX ADMIN — SIMVASTATIN 20 MG: 20 TABLET, FILM COATED ORAL at 22:26

## 2025-03-31 RX ADMIN — OXYCODONE HYDROCHLORIDE 5 MG: 5 TABLET ORAL at 08:53

## 2025-03-31 RX ADMIN — SENNOSIDES AND DOCUSATE SODIUM 2 TABLET: 50; 8.6 TABLET ORAL at 16:55

## 2025-03-31 ASSESSMENT — COGNITIVE AND FUNCTIONAL STATUS - GENERAL
STANDING UP FROM CHAIR USING ARMS: A LOT
DRESSING REGULAR UPPER BODY CLOTHING: A LOT
WALKING IN HOSPITAL ROOM: A LOT
MOBILITY SCORE: 14
CLIMB 3 TO 5 STEPS WITH RAILING: TOTAL
MOVING TO AND FROM BED TO CHAIR: A LOT
STANDING UP FROM CHAIR USING ARMS: A LITTLE
SUGGESTED CMS G CODE MODIFIER MOBILITY: CL
MOVING FROM LYING ON BACK TO SITTING ON SIDE OF FLAT BED: A LITTLE
WALKING IN HOSPITAL ROOM: A LOT
TURNING FROM BACK TO SIDE WHILE IN FLAT BAD: A LITTLE
DRESSING REGULAR LOWER BODY CLOTHING: A LOT
PERSONAL GROOMING: A LITTLE
SUGGESTED CMS G CODE MODIFIER DAILY ACTIVITY: CK
HELP NEEDED FOR BATHING: A LOT
MOVING TO AND FROM BED TO CHAIR: A LOT
MOVING FROM LYING ON BACK TO SITTING ON SIDE OF FLAT BED: A LITTLE
MOBILITY SCORE: 14
DAILY ACTIVITIY SCORE: 15
CLIMB 3 TO 5 STEPS WITH RAILING: A LOT
TURNING FROM BACK TO SIDE WHILE IN FLAT BAD: A LITTLE
SUGGESTED CMS G CODE MODIFIER MOBILITY: CL
TOILETING: A LOT

## 2025-03-31 ASSESSMENT — GAIT ASSESSMENTS: DEVIATION: ANTALGIC;STEP TO;DECREASED BASE OF SUPPORT;BRADYKINETIC

## 2025-03-31 ASSESSMENT — ENCOUNTER SYMPTOMS
SHORTNESS OF BREATH: 0
CONSTIPATION: 1
FEVER: 0

## 2025-03-31 ASSESSMENT — PAIN DESCRIPTION - PAIN TYPE
TYPE: ACUTE PAIN;SURGICAL PAIN
TYPE: SURGICAL PAIN

## 2025-03-31 NOTE — THERAPY
Physical Therapy   Daily Treatment     Patient Name: Coty Valdivia  Age:  79 y.o., Sex:  female  Medical Record #: [id number]  Today's Date: 3/31/2025     Precautions  Precautions: Fall Risk;Weight Bearing As Tolerated Right Lower Extremity  Comments: R hip fx, IMN    Assessment    Pt was agreeable to PT session.  She is most limited by pain requiring assist with standing mobility to decreased RLE WB in single limb support.  Pt was only able to complete pivot steps today with the RLE buckling with her initial steps.  She started her pivot transfer with Feliz, then needed maxA to complete the pivot to the chair.  PT will continue to follow.  Recommend post acute PT services.    Plan    Treatment Plan Status: (P) Continue Current Treatment Plan  Type of Treatment: Bed Mobility, Gait Training, Neuro Re-Education / Balance, Self Care / Home Evaluation, Stair Training, Therapeutic Activities, Therapeutic Exercise  Treatment Frequency: 5 Times per Week  Treatment Duration: Until Therapy Goals Met    DC Equipment Recommendations: (P) Unable to determine at this time  Discharge Recommendations: (P) Recommend post-acute placement for additional physical therapy services prior to discharge home         Objective       03/31/25 0950   Precautions   Precautions Fall Risk;Weight Bearing As Tolerated Right Lower Extremity   Comments R hip fx, IMN   Vitals   O2 (LPM) 1   O2 Delivery Device Silicone Nasal Cannula   Pain 0 - 10 Group   Therapist Pain Assessment During Activity  (R hip- not rated)   Cognition    Cognition / Consciousness X   Speech/ Communication Hard of Hearing   Level of Consciousness Alert   Ability To Follow Commands   (Pt demonstrastes STM impairiment requiring repeat of commands to complete task)   Balance   Sitting Balance (Static) Fair   Sitting Balance (Dynamic) Fair   Standing Balance (Static) Fair -   Standing Balance (Dynamic) Poor +   Weight Shift Sitting Fair   Weight Shift Standing Poor   Comments with  FWW   Bed Mobility    Supine to Sit Minimal Assist   Scooting Minimal Assist   Rolling Minimal Assist to Rt.   Skilled Intervention Sequencing;Tactile Cuing;Verbal Cuing   Comments HOB partially elevated, rail   Gait Analysis   Deviation Antalgic;Step To;Decreased Base Of Support;Bradykinetic  (increasede BUE WB, RLE buckled with WB)   Weight Bearing Status WBAT RLE   Comments pivot steps only   Functional Mobility   Sit to Stand Minimal Assist   Bed, Chair, Wheelchair Transfer Maximal Assist  (inital 1/2 of pivot Feliz, then req maxA to swing hips to the chair d/t pain)   Transfer Method Stand Step   Mobility with FWW   Activity Tolerance   Sitting in Chair post session   Short Term Goals    Short Term Goal # 1 Pt will complete bed mobility with supervision from a flat bed to progress function in 6 visits.   Goal Outcome # 1 goal not met   Short Term Goal # 2 Pt will transfer with FWW and supervision to progress function in 6 visits.   Goal Outcome # 2 Goal not met   Short Term Goal # 3 Pt will ambulate 50ft with FWW and supervision to progress function in 6 visits.   Goal Outcome # 3 Goal not met   Physical Therapy Treatment Plan   Physical Therapy Treatment Plan Continue Current Treatment Plan   Anticipated Discharge Equipment and Recommendations   DC Equipment Recommendations Unable to determine at this time   Discharge Recommendations Recommend post-acute placement for additional physical therapy services prior to discharge home   Interdisciplinary Plan of Care Collaboration   IDT Collaboration with  Nursing   Patient Position at End of Therapy Seated;Chair Alarm On;Call Light within Reach;Tray Table within Reach   Collaboration Comments RN updated   Session Information   Date / Session Number  3/31 -2 (2/5, 4/4)   Priority   (hip fx)

## 2025-03-31 NOTE — CARE PLAN
The patient is Watcher - Medium risk of patient condition declining or worsening    Shift Goals  Clinical Goals: stability, mobility  Patient Goals: comfort  Family Goals: na    Progress made toward(s) clinical / shift goals:    Problem: Pain - Standard  Goal: Alleviation of pain or a reduction in pain to the patient’s comfort goal  Outcome: Progressing     Problem: Skin Integrity  Goal: Skin integrity is maintained or improved  Outcome: Progressing     Problem: Fall Risk  Goal: Patient will remain free from falls  Outcome: Progressing       Patient is not progressing towards the following goals:

## 2025-03-31 NOTE — PROGRESS NOTES
Bedside report received, pt care assumed. Pt denies any additional needs at this time. Bed in lowest position, bed alarm on pt educated on fall risk and verbalized understanding, call light within reach.

## 2025-03-31 NOTE — PROGRESS NOTES
"      Orthopaedic Progress Note    Interval changes:  Patient doing well    RLE dressings are CDI  Cleared for DC to SNF by ortho pending medicine clearance    ROS - Patient denies any new issues.  Pain well controlled.    /48   Pulse (!) 59   Temp 36.6 °C (97.9 °F) (Temporal)   Resp 16   Ht 1.57 m (5' 1.81\")   Wt 48.2 kg (106 lb 4.2 oz)   SpO2 94%     Patient seen and examined  No acute distress  Breathing non labored  RRR  RLE dressings CDI, DNVI, moves all toes, cap refill <2 sec.     Recent Labs     03/29/25  0141 03/30/25  0029 03/31/25  0812   WBC 7.8 9.1 6.6   RBC 3.22* 2.75* 2.77*   HEMOGLOBIN 10.5* 9.0* 9.1*   HEMATOCRIT 31.5* 27.1* 26.7*   MCV 97.8 98.5* 96.4   MCH 32.6 32.7 32.9   MCHC 33.3 33.2 34.1   RDW 49.0 50.4* 49.3   PLATELETCT 125* 142* 165   MPV 10.6 10.9 10.2       Active Hospital Problems    Diagnosis     Macrocytic anemia [D53.9]     ERROL (acute kidney injury) (HCC) [N17.9]     Moderate protein-calorie malnutrition (HCC) [E44.0]     Closed comminuted intertrochanteric fracture of proximal end of right femur (HCC) [S72.141A]     Symptomatic bradycardia [R00.1]     Stage 3b chronic kidney disease [N18.32]     Primary hypertension [I10]     Fall [W19.XXXA]     Mixed hyperlipidemia [E78.2]     Smoking [F17.200]     ACP (advance care planning) [Z71.89]        Assessment/Plan:  Patient doing well   RLE dressings are CDI  Cleared for DC to SNF by ortho pending medicine clearance  POD#3 S/P Open treatment of Right proximal femur fracture intertrochanteric with IMN    Wt bearing status - WBAT  Wound care/Drains - Dressings to be changed every other day by nursing. Or PRN for saturation    Future Procedures - none   Sutures/Staples out- 14-21 days post operatively. Removal by ortho mid levels only.  PT/OT-initiated  Antibiotics: Perioperative completed  DVT Prophylaxis- TEDS/SCDs/Foot pumps/heparin  Salmeron-not needed per ortho  Case Coordination for Discharge Planning - Disposition per therapy " recs.

## 2025-03-31 NOTE — DOCUMENTATION QUERY
"                                                                         [facility]                                                                       Query Response Note      PATIENT:               DIANE HUMPHREY  ACCT #:                  [id number]  MRN:                     [id number]  :                      1945  ADMIT DATE:       3/26/2025 3:47 AM  DISCH DATE:          RESPONDING  PROVIDER #:        [id number]           QUERY TEXT:    Comminuted right intertrochanteric femur fracture following fall is documented in the medical record. History of osteoporosis is also documented.    Please clarify whether the documented fracture is due to traumatic or non-traumatic cause.    The patient's Clinical Indicators include:  H&P: \"presented 3/26/2025 with fall and hip pain.  PMH of hypertension, osteoporosis, GERD, dyslipidemia, depression, CKD.\"    3/26 PN: \"Ground-level fall with right hip pain ...  Osteoporosis\"     3/28 PN: \"Closed comminuted intertrochanteric fracture of proximal end of right femur ... Following fall\"    3/27 CT: \"Bone mineralization is diffusely decreased. There is a comminuted right intertrochanteric femur fracture with mild varus angulation. No additional fracture or malalignment is identified.\"    Risk Factors: 79 y.o. female with right femur fracture s/p fall, history of osteoporosis, moderate malnutrition.  Treatment: Open treatment of right femur fracture on 3/28, PT/OT.    Thank you,  JULI Spaulding, RN, CCDS, CCS  Clinical    Connect via [employer] or [email or web address]  Options provided:   -- Traumatic fracture   -- Pathological fracture caused by osteoporosis   -- Other explanation, (please specify other explanation)   -- Unable to determine      Query created by: Arelis Huynh on 3/31/2025 12:58 PM    RESPONSE TEXT:    Pathological fracture caused by osteoporosis          Electronically signed by:  FOREST HUERTA MD 3/31/2025 3:17 PM      "

## 2025-03-31 NOTE — CARE PLAN
The patient is Stable - Low risk of patient condition declining or worsening    Shift Goals  Clinical Goals: pain control, safety and comfort  Patient Goals: sleep  Family Goals: n/a    Progress made toward(s) clinical / shift goals:    Problem: Pain - Standard  Goal: Alleviation of pain or a reduction in pain to the patient’s comfort goal  Outcome: Progressing     Problem: Knowledge Deficit - Standard  Goal: Patient and family/care givers will demonstrate understanding of plan of care, disease process/condition, diagnostic tests and medications  Outcome: Progressing     Problem: Skin Integrity  Goal: Skin integrity is maintained or improved  Outcome: Progressing     Problem: Fall Risk  Goal: Patient will remain free from falls  Outcome: Progressing       Patient is alert and oriented, on 1L NC.   Fall protocol in effect. Bed in lowest position. Brakes and bed alarm on.   Maintained a clutter free environment. Skid socks on. Call light and personal belongings within reach.   Patient c/o of pain, treated per MAR. Educated on pain scale.   Patient educated on POC. Encouraged verbalize of feelings.   All questions were answered.

## 2025-03-31 NOTE — CARE PLAN
The patient is Stable - Low risk of patient condition declining or worsening    Shift Goals  Clinical Goals: pain control, safety and comfort  Patient Goals: sleep  Family Goals: n/a    Progress made toward(s) clinical / shift goals:    Problem: Urinary Elimination  Goal: Establish and maintain regular urinary output  Outcome: Progressing  Note: Purewick in place     Problem: Mobility  Goal: Patient's capacity to carry out activities will improve  Outcome: Progressing  Flowsheets (Taken 3/31/2025 0906)  Mobility:   Encouraged mobilization per interdisciplinary team recommendations   Monitored for signs of activity intolerance   Provided assistive devices   Provided rest periods between activities   Collaborated with PT/OT   Administered pain management to allow progressive mobilization  Note: Patient refused to mobilize with nursing staff     Problem: Infection - Standard  Goal: Patient will remain free from infection  Outcome: Progressing  Flowsheets (Taken 3/31/2025 0906)  Standard Infection Interventions:   Assessed for signs and symptoms of infection   Instructed patient/family on signs and symptoms of infection   Provided education on proper hand hygiene and infection prevention measures   Implemented standard precautions       Patient is not progressing towards the following goals:

## 2025-03-31 NOTE — DIETARY
Nutrition Services: Follow-up for PO Intake   Day 4 of admit. Coty Valdivia is a 79 y.o. female with admitting DX of Symptomatic bradycardia [R00.1]    Objective:  Noted significant increase in weight from 3/29-3/30; likely inaccurate bed scale. Will continue to monitor.  Last BM 3/31  Skin/wounds: R hip surgical incision, redness/blanching to bilateral heels  Fluid accumulation: None documented  Pt underwent Open treatment of Right proximal femur fracture intertrochanteric with IMN on 3/28.    Subjective:  Telehealth visit - call placed to pt's cell phone number listed in EMR; not answered. Per flowsheets, pt with % intake at 5 documented meals since last assessment.     Current diet order:   Regular diet     Malnutrition risk: Moderate Malnutrition in context of Chronic Illness /CKD stage 3     Nutrition Dx: Moderate Malnutrition in context of Chronic Illness /CKD stage 3 related to moderate fat and muscle wasting  as evidenced by Mild muscle loss at ( temple, calf, patella) Mid fat loss at ( buccal) .       Nutrition Dx Status: Ongoing     Problem: Nutritional:  Goal: Achieve adequate nutritional intake  Description: Patient will consume >50% of meals  Outcome: Met      Plan/ Recommendations:      Encourage intake of meals, goal for >50% consumption from meals and/or supplements  Document intake of all meals as % taken in ADL's to provide interdisciplinary communication across all shifts.   Monitor weight.  Nutrition rep available to see patient for ongoing meal and snack preferences.  Obtain supplement order per RD as needed.      Care plan met. RD will continue to follow peripherally. Available PRN via consult.

## 2025-03-31 NOTE — PROGRESS NOTES
[facility] Medicine Daily Progress Note    Date of Service  3/31/2025    Chief Complaint  Coty Valdivia is a 79 y.o. female admitted 3/26/2025 with symptomatic bradycardia, fall    Hospital Course  Coty Valdivia is a 79 y.o. female who presented 3/26/2025 with fall and hip pain.  PMH of hypertension, osteoporosis, GERD, dyslipidemia, depression, CKD.  Recent admission to this facility 3/15 - 3/20 and was diagnosed with an acute stroke and hypertensive emergency.  She was discharged to rehab and started on carvedilol and amlodipine.  She had a fall today and is complaining of severe right hip pain so she was brought into the ED.  Symptomatic bradycardia due to beta blocker.  Rate initially in the 40s which has improved to the 50s/60s with beta-blocker washout.  Further imaging of hip including CT demonstrated a femoral fracture.  Orthopedic surgery consulted and plan for OR on 3/28.    Interval Problem Update    Patient is afebrile on 1 L nasal cannula  I reviewed hospitalization records  I reviewed chemistry panel BUN 44 creatinine 1.6  I reviewed CBC hemoglobin 9.1  I discussed with case management and orthopedics    Total time spent today reviewing medical records lab results examining patient discussing with consultant nursing staff case management 40 minutes    I have discussed this patient's plan of care and discharge plan at IDT rounds today with Case Management, Nursing, Nursing leadership, and other members of the IDT team.      Code Status  Full Code    Disposition  The patient is medically cleared for discharge to home or a post-acute facility.      I have placed the appropriate orders for post-discharge needs.    Review of Systems  Review of Systems   Constitutional:  Negative for fever.   Respiratory:  Negative for shortness of breath.    Gastrointestinal:  Positive for constipation.   Musculoskeletal:  Positive for joint pain.        Physical Exam  Temp:  [36.1 °C (97 °F)-36.7 °C (98.1 °F)] 36.6 °C  (97.9 °F)  Pulse:  [58-64] 59  Resp:  [15-16] 16  BP: (120-149)/(44-57) 138/48  SpO2:  [90 %-96 %] 94 %    Physical Exam  Constitutional:       Comments: Frail chronically ill-appearing   HENT:      Head: Atraumatic.   Cardiovascular:      Rate and Rhythm: Normal rate and regular rhythm.      Heart sounds: No murmur heard.  Pulmonary:      Breath sounds: Rales present.   Abdominal:      Palpations: Abdomen is soft.      Tenderness: There is no abdominal tenderness. There is no guarding.   Musculoskeletal:      Comments: Right hip surgical wound dressed with small amount of dried blood on dressing stable from previous marking   Skin:     General: Skin is warm and dry.   Neurological:      General: No focal deficit present.      Mental Status: She is alert.   Psychiatric:         Behavior: Behavior is slowed.         Fluids    Intake/Output Summary (Last 24 hours) at 3/31/2025 1318  Last data filed at 3/30/2025 1800  Gross per 24 hour   Intake 600 ml   Output 300 ml   Net 300 ml        Laboratory  Recent Labs     03/29/25  0141 03/30/25  0029 03/31/25  0812   WBC 7.8 9.1 6.6   RBC 3.22* 2.75* 2.77*   HEMOGLOBIN 10.5* 9.0* 9.1*   HEMATOCRIT 31.5* 27.1* 26.7*   MCV 97.8 98.5* 96.4   MCH 32.6 32.7 32.9   MCHC 33.3 33.2 34.1   RDW 49.0 50.4* 49.3   PLATELETCT 125* 142* 165   MPV 10.6 10.9 10.2     Recent Labs     03/29/25  0141 03/30/25  0029 03/31/25  0812   SODIUM 134* 140 138   POTASSIUM 4.8 4.9 5.0   CHLORIDE 108 110 107   CO2 18* 19* 22   GLUCOSE 178* 101* 101*   BUN 35* 35* 44*   CREATININE 1.59* 1.56* 1.63*   CALCIUM 8.9 8.6 8.8                   Imaging  DX-PORTABLE FLUORO > 1 HOUR   Preliminary Result      Portable fluoroscopy utilized for 58 seconds.         INTERPRETING LOCATION: [address] [place] [address], [id number]      DX-HIP-UNILATERAL-W/O PELVIS-2/3 VIEWS RIGHT   Final Result      Digitized intraoperative radiograph is submitted for review.  This examination is not for diagnostic purpose but for guidance during a  surgical procedure. Please see the patient's chart for full procedural details.      CT-HIP W/O PLUS RECONS RIGHT   Final Result      Comminuted right intertrochanteric femur fracture.      CT-HEAD W/O   Final Result      No acute process. Moderate chronic ischemic white matter demyelination.               DX-HIP-UNILATERAL-WITH PELVIS-1 VIEW RIGHT   Final Result      No acute process.           Assessment/Plan  * Closed comminuted intertrochanteric fracture of proximal end of right femur (HCC)  Assessment & Plan  Following fall  CT hip - Comminuted right intertrochanteric femur fracture  Consult to ortho  OR today    3/29:   Postop day #1 status post open treatment of right femur fracture  -Pain controlled  -PT OT  -Bowel regimen  -Hemoglobin slight downtrending, repeat CBC in a.m.    3/30: pain controlled, constipated, increase bowel regimen    3/31 continue pain management and encourage mobilization SNF referral discussed with case management  Cleared for discharge by Ortho    Macrocytic anemia  Assessment & Plan  With component of acute blood loss anemia postoperatively    No clinical signs of active bleeding at this time hemoglobin stable overnight      Moderate protein-calorie malnutrition (HCC)  Assessment & Plan  Dietary consult    ERROL (acute kidney injury) (HCC)  Assessment & Plan  Cr 1.54 --> 2.10 --> 2.02  Stop NSAID's, lovenox transitioned to heparin for dvt ppx  Possibly sequelae of medications vs hypoperfusion due to bradycardia  Monitor    3/31/2025  Serum creatinine overall stable 1.59-1.63    Fall- (present on admission)  Assessment & Plan  Fall precautions  PT OT  Avoid aggressive blood pressure control    Primary hypertension- (present on admission)  Assessment & Plan  Continue amlodipine, discontinue carvedilol due to presentation of symptomatic bradycardia  Consider adding hydralazine if BP continues to increase  Not a candidate for initiating ARB at this time    Stage 3b chronic kidney disease-  (present on admission)  Assessment & Plan  Suspect this is her baseline creatinine.      Creatinine overall stable 1.5-1.6  Avoid nephrotoxic agents  Encourage p.o. hydration    Symptomatic bradycardia- (present on admission)  Assessment & Plan  Presents with a fall preceded by dizziness.  Found to be bradycardic in the 40s, was started on carvedilol following last admission for hypertensive emergency and acute stroke earlier this month    Carvedilol discontinued with improvement in bradycardia would avoid AV josefina blockers in the future    Mixed hyperlipidemia- (present on admission)  Assessment & Plan  Continue statin    Smoking- (present on admission)  Assessment & Plan  Patient counseled on cessation continue nicotine replacement therapy         VTE prophylaxis:    heparin ppx      I have performed a physical exam and reviewed and updated ROS and Plan today (3/31/2025). In review of yesterday's note (3/30/2025), there are no changes except as documented above.

## 2025-03-31 NOTE — DISCHARGE PLANNING
Case Management Discharge Planning    Admission Date: 3/26/2025  GMLOS: 3.4  ALOS: 4    6-Clicks ADL Score: 18  6-Clicks Mobility Score: 14  PT and/or OT Eval ordered: Yes  Post-acute Referrals Ordered: Yes  Post-acute Choice Obtained: Yes  Has referral(s) been sent to post-acute provider:  Yes      Anticipated Discharge Dispo: Discharge Disposition: D/T to SNF with Medicare cert in anticipation of skilled care (03)    DME Needed: No    Action(s) Taken: Patient was discussed in morning rounds. Per MD, hgb is stable and patient is medically cleared to transfer to [facility].  DPA followed up with [facility]--they will submit to insurance for authorization.      Escalations Completed: None    Medically Clear: Yes    Next Steps: RN CM to continue to follow for DC planning      Barriers to Discharge: Pending insurance auth with [facility]

## 2025-03-31 NOTE — DISCHARGE PLANNING
0910  Agency/Facility Name: [facility]  Spoke To: Zoie  Outcome: DPA called regarding insurance auth. They are resubmitting at this time.

## 2025-04-01 ENCOUNTER — APPOINTMENT (OUTPATIENT)
Dept: RADIOLOGY | Facility: MEDICAL CENTER | Age: 80
DRG: 481 | End: 2025-04-01
Attending: INTERNAL MEDICINE
Payer: MEDICARE

## 2025-04-01 PROBLEM — N18.30 ACUTE RENAL FAILURE SUPERIMPOSED ON STAGE 3 CHRONIC KIDNEY DISEASE (HCC): Status: ACTIVE | Noted: 2025-03-27

## 2025-04-01 LAB
ANION GAP SERPL CALC-SCNC: 10 MMOL/L (ref 7–16)
BUN SERPL-MCNC: 45 MG/DL (ref 8–22)
CALCIUM SERPL-MCNC: 8.7 MG/DL (ref 8.5–10.5)
CHLORIDE SERPL-SCNC: 107 MMOL/L (ref 96–112)
CO2 SERPL-SCNC: 22 MMOL/L (ref 20–33)
CREAT SERPL-MCNC: 1.42 MG/DL (ref 0.5–1.4)
D DIMER PPP IA.FEU-MCNC: 3.07 UG/ML (FEU) (ref 0–0.5)
EKG IMPRESSION: NORMAL
ERYTHROCYTE [DISTWIDTH] IN BLOOD BY AUTOMATED COUNT: 50 FL (ref 35.9–50)
GFR SERPLBLD CREATININE-BSD FMLA CKD-EPI: 37 ML/MIN/1.73 M 2
GLUCOSE SERPL-MCNC: 86 MG/DL (ref 65–99)
HCT VFR BLD AUTO: 26.7 % (ref 37–47)
HGB BLD-MCNC: 8.8 G/DL (ref 12–16)
MCH RBC QN AUTO: 32.2 PG (ref 27–33)
MCHC RBC AUTO-ENTMCNC: 33 G/DL (ref 32.2–35.5)
MCV RBC AUTO: 97.8 FL (ref 81.4–97.8)
PLATELET # BLD AUTO: 175 K/UL (ref 164–446)
PMV BLD AUTO: 10.3 FL (ref 9–12.9)
POTASSIUM SERPL-SCNC: 5 MMOL/L (ref 3.6–5.5)
RBC # BLD AUTO: 2.73 M/UL (ref 4.2–5.4)
SODIUM SERPL-SCNC: 139 MMOL/L (ref 135–145)
TROPONIN T SERPL-MCNC: 18 NG/L (ref 6–19)
WBC # BLD AUTO: 7 K/UL (ref 4.8–10.8)

## 2025-04-01 PROCEDURE — A9270 NON-COVERED ITEM OR SERVICE: HCPCS | Performed by: INTERNAL MEDICINE

## 2025-04-01 PROCEDURE — 93005 ELECTROCARDIOGRAM TRACING: CPT | Mod: TC | Performed by: INTERNAL MEDICINE

## 2025-04-01 PROCEDURE — 700102 HCHG RX REV CODE 250 W/ 637 OVERRIDE(OP): Performed by: STUDENT IN AN ORGANIZED HEALTH CARE EDUCATION/TRAINING PROGRAM

## 2025-04-01 PROCEDURE — 36415 COLL VENOUS BLD VENIPUNCTURE: CPT

## 2025-04-01 PROCEDURE — 85379 FIBRIN DEGRADATION QUANT: CPT

## 2025-04-01 PROCEDURE — 84484 ASSAY OF TROPONIN QUANT: CPT

## 2025-04-01 PROCEDURE — 99233 SBSQ HOSP IP/OBS HIGH 50: CPT | Performed by: INTERNAL MEDICINE

## 2025-04-01 PROCEDURE — A9270 NON-COVERED ITEM OR SERVICE: HCPCS | Performed by: STUDENT IN AN ORGANIZED HEALTH CARE EDUCATION/TRAINING PROGRAM

## 2025-04-01 PROCEDURE — 700102 HCHG RX REV CODE 250 W/ 637 OVERRIDE(OP): Performed by: INTERNAL MEDICINE

## 2025-04-01 PROCEDURE — 770001 HCHG ROOM/CARE - MED/SURG/GYN PRIV*

## 2025-04-01 PROCEDURE — 80048 BASIC METABOLIC PNL TOTAL CA: CPT

## 2025-04-01 PROCEDURE — 93010 ELECTROCARDIOGRAM REPORT: CPT | Performed by: INTERNAL MEDICINE

## 2025-04-01 PROCEDURE — 700111 HCHG RX REV CODE 636 W/ 250 OVERRIDE (IP): Performed by: HOSPITALIST

## 2025-04-01 PROCEDURE — 85027 COMPLETE CBC AUTOMATED: CPT

## 2025-04-01 PROCEDURE — 71045 X-RAY EXAM CHEST 1 VIEW: CPT

## 2025-04-01 RX ORDER — HYDROCHLOROTHIAZIDE 12.5 MG/1
12.5 TABLET ORAL DAILY
Status: SHIPPED
Start: 2025-04-02

## 2025-04-01 RX ORDER — AMLODIPINE BESYLATE 5 MG/1
10 TABLET ORAL DAILY
Status: SHIPPED
Start: 2025-04-01 | End: 2025-05-01

## 2025-04-01 RX ORDER — ENOXAPARIN SODIUM 100 MG/ML
30 INJECTION SUBCUTANEOUS DAILY
Qty: 28 EACH | Refills: 0 | Status: ACTIVE | DISCHARGE
Start: 2025-04-01 | End: 2025-04-29

## 2025-04-01 RX ORDER — POLYETHYLENE GLYCOL 3350 17 G/17G
17 POWDER, FOR SOLUTION ORAL DAILY
Status: SHIPPED
Start: 2025-04-02

## 2025-04-01 RX ORDER — AMOXICILLIN 250 MG
2 CAPSULE ORAL EVERY EVENING
Status: SHIPPED
Start: 2025-04-01

## 2025-04-01 RX ORDER — HYDROCHLOROTHIAZIDE 12.5 MG/1
12.5 TABLET ORAL
Status: DISCONTINUED | OUTPATIENT
Start: 2025-04-01 | End: 2025-04-02 | Stop reason: HOSPADM

## 2025-04-01 RX ORDER — MORPHINE SULFATE 4 MG/ML
2 INJECTION INTRAVENOUS ONCE
Status: DISCONTINUED | OUTPATIENT
Start: 2025-04-01 | End: 2025-04-02 | Stop reason: HOSPADM

## 2025-04-01 RX ORDER — OXYCODONE HYDROCHLORIDE 5 MG/1
5 TABLET ORAL EVERY 4 HOURS PRN
Qty: 5 TABLET | Status: SHIPPED
Start: 2025-04-01 | End: 2025-04-01

## 2025-04-01 RX ORDER — OXYCODONE HYDROCHLORIDE 5 MG/1
5 TABLET ORAL EVERY 4 HOURS PRN
Qty: 5 TABLET | Refills: 0 | Status: SHIPPED | OUTPATIENT
Start: 2025-04-01 | End: 2025-04-01

## 2025-04-01 RX ORDER — OXYCODONE HYDROCHLORIDE 5 MG/1
5 TABLET ORAL EVERY 4 HOURS PRN
Qty: 5 TABLET | Refills: 0 | Status: SHIPPED | OUTPATIENT
Start: 2025-04-01 | End: 2025-04-16

## 2025-04-01 RX ADMIN — DONEPEZIL HYDROCHLORIDE 10 MG: 5 TABLET, FILM COATED ORAL at 20:16

## 2025-04-01 RX ADMIN — HEPARIN SODIUM 5000 UNITS: 5000 INJECTION, SOLUTION INTRAVENOUS; SUBCUTANEOUS at 05:31

## 2025-04-01 RX ADMIN — PAROXETINE HYDROCHLORIDE 40 MG: 20 TABLET, FILM COATED ORAL at 05:30

## 2025-04-01 RX ADMIN — OMEPRAZOLE 40 MG: 20 CAPSULE, DELAYED RELEASE ORAL at 05:31

## 2025-04-01 RX ADMIN — POLYETHYLENE GLYCOL 3350 1 PACKET: 17 POWDER, FOR SOLUTION ORAL at 05:31

## 2025-04-01 RX ADMIN — TRAMADOL HYDROCHLORIDE 50 MG: 50 TABLET, COATED ORAL at 18:05

## 2025-04-01 RX ADMIN — HYDROCHLOROTHIAZIDE 12.5 MG: 12.5 TABLET ORAL at 09:04

## 2025-04-01 RX ADMIN — HEPARIN SODIUM 5000 UNITS: 5000 INJECTION, SOLUTION INTRAVENOUS; SUBCUTANEOUS at 23:32

## 2025-04-01 RX ADMIN — ASPIRIN 81 MG: 81 TABLET, COATED ORAL at 05:31

## 2025-04-01 RX ADMIN — OXYCODONE HYDROCHLORIDE 5 MG: 5 TABLET ORAL at 23:32

## 2025-04-01 RX ADMIN — OXYCODONE HYDROCHLORIDE 5 MG: 5 TABLET ORAL at 20:16

## 2025-04-01 RX ADMIN — NICOTINE 14 MG: 14 PATCH TRANSDERMAL at 05:31

## 2025-04-01 RX ADMIN — SIMVASTATIN 20 MG: 20 TABLET, FILM COATED ORAL at 20:16

## 2025-04-01 RX ADMIN — HEPARIN SODIUM 5000 UNITS: 5000 INJECTION, SOLUTION INTRAVENOUS; SUBCUTANEOUS at 13:05

## 2025-04-01 RX ADMIN — AMLODIPINE BESYLATE 10 MG: 10 TABLET ORAL at 05:31

## 2025-04-01 RX ADMIN — SENNOSIDES AND DOCUSATE SODIUM 2 TABLET: 50; 8.6 TABLET ORAL at 18:04

## 2025-04-01 RX ADMIN — OXYCODONE HYDROCHLORIDE 5 MG: 5 TABLET ORAL at 09:04

## 2025-04-01 ASSESSMENT — PAIN DESCRIPTION - PAIN TYPE
TYPE: ACUTE PAIN
TYPE: ACUTE PAIN
TYPE: ACUTE PAIN;SURGICAL PAIN
TYPE: ACUTE PAIN;CHRONIC PAIN
TYPE: ACUTE PAIN;CHRONIC PAIN
TYPE: SURGICAL PAIN

## 2025-04-01 NOTE — CARE PLAN
The patient is Stable - Low risk of patient condition declining or worsening    Shift Goals  Clinical Goals: Pain Management, Safety, Mobility, IS  Patient Goals: Comfort\  Family Goals: not present    Progress made toward(s) clinical / shift goals:      Problem: Knowledge Deficit - Standard  Goal: Patient and family/care givers will demonstrate understanding of plan of care, disease process/condition, diagnostic tests and medications  Outcome: Progressing  Education provided on plan of care this shift. Questions answered. Patient verbalizes understanding.      Problem: Pain - Standard  Goal: Alleviation of pain or a reduction in pain to the patient’s comfort goal  Outcome: Progressing     Problem: Skin Integrity  Goal: Skin integrity is maintained or improved  Outcome: Progressing     Problem: Mobility  Goal: Patient's capacity to carry out activities will improve  Outcome: Progressing

## 2025-04-01 NOTE — DISCHARGE PLANNING
Case Management Discharge Planning    Admission Date: 3/26/2025  GMLOS: 4.4  ALOS: 5    6-Clicks ADL Score: 15  6-Clicks Mobility Score: 14  PT and/or OT Eval ordered: Yes  Post-acute Referrals Ordered: Yes  Post-acute Choice Obtained: Yes  Has referral(s) been sent to post-acute provider:  Yes      Anticipated Discharge Dispo: Discharge Disposition: D/T to SNF with Medicare cert in anticipation of skilled care (03)    DME Needed: No    Action(s) Taken: Per DPA, [facility] got insurance auth from [facility].  They requested a 1630  time.  RNCM notified bedside RN and MD.  RNCM sent order to [employer] to request transportation.  RNCM met with patient at the bedside to update.  Patient agreeable to transfer to [facility] at 1630 this afternoon.  RNCM called and updated Dominic Valdivia, patient's son, who is agreeable to DC plan.  RNCM discussed IMM with patient/son-verbal consent given.      1329  Transport confirmed via [facility] at 1630 to [facility].  [facility] packet with face sheets x2, chart notes, DC summary and hard RX for pain medication on CM office.  Phone number to call report provided to bedside RN.      Escalations Completed: None    Medically Clear: Yes    Next Steps: RN CM to continue to follow for DC planning      Barriers to Discharge: None

## 2025-04-01 NOTE — DISCHARGE SUMMARY
Discharge Summary    CHIEF COMPLAINT ON ADMISSION  Chief Complaint   Patient presents with    Fall     [facility] Found on floor by staff with complaint of severe R hip pain. Patient reported being dizzy before falling. No obvious head trauma. Patient denies head strike, LOC, other injuries. Patient AAOx 3 ( does not remember event)       Reason for Admission  ems    Admission Date  3/26/2025     CODE STATUS  Full Code    HPI & HOSPITAL COURSE  Coty Valdivia is a 79 y.o. female with hypertension, osteoporosis, GERD, dyslipidemia, depression, and CKD, recent admission for acute stroke and hypertensive emergency and was discharged to rehab on carvedilol and amlodipine, admitted on 3/26/2025 with fall, dizziness, and severe right hip pain.  She was noted to have bradycardia due to beta-blocker with heart rate initially in the 40s which improved to the 60s with beta-blocker washout.  Initial x-ray did not show any fracture but due to continued right hip pain, CT was pursued which showed femoral fracture.  Orthopedics was consulted and patient underwent ORIF of the right proximal fracture intertrochanteric with IMN on 3/28/2025.  She was placed on analgesics, bowel regimen, and pharmacologic DVT prophylaxis.  She did have ERROL, with subsequent improvement of renal function back to baseline.  She did have an episode of chest pain with reproducible chest tenderness on exam, for which cardiac ischemic workup was pursued and was negative.  CTA of the chest also did not show any PE. PT/OT recommended postacute placement which was pursued.  She remained hemodynamically stable and afebrile.  She was started on hydrochlorothiazide in lieu of beta-blocker.  Her pain was well-controlled with oral pain medications.  She was cleared for discharge by orthopedics.     I have personally seen and examined the patient on the day of discharge. With her clinical improvement, she was deemed ready to discharge from the  Patient ID: Oimd is a 63 hospital as she did not have any further hospitalization needs. Patient felt comfortable going home. The discharge plan was discussed with the patient, with which she was agreeable to.     Therefore, she is discharged in good and stable condition to skilled nursing facility.    The patient met 2-midnight criteria for an inpatient stay at the time of discharge.      FOLLOW UP ITEMS POST DISCHARGE  * Closed comminuted intertrochanteric fracture of proximal end of right femur (HCC)- (present on admission)  Assessment & Plan  - now s/p ORIF of the right proximal fracture intertrochanteric with IMN on 3/28/2025.   - continue pain control with oral oxycodone.   - Continue pharmacologic DVT prophylaxis with SQ Lovenox for 4 weeks and until fully ambulatory.  Continue heparin SQ.   -Continue PT/OT at the SNF.      Symptomatic bradycardia- (present on admission)  Assessment & Plan  - Presents with fall preceded by dizziness, was bradycardic initially. Was started on carvedilol following last admission for hypertensive emergency and acute stroke earlier this month.  -Carvedilol has been discontinued.  Bradycardia has improved, heart rate now in the 60s.  Symptoms have resolved.  -Avoid AV josefina blockers in the future.    Acute renal failure superimposed on stage 3 chronic kidney disease (HCC)- (present on admission)  Assessment & Plan  - Improved.  Now back to baseline.    Fall- (present on admission)  Assessment & Plan  -Continue PT/OT at the SNF.    Macrocytic anemia- (present on admission)  Assessment & Plan  -With component of acute blood loss anemia postoperatively  -No clinical signs of active bleeding at this time, hemoglobin stable.  - Restrictive transfusion strategy.  Transfuse if hemoglobin drops below 7.  Monitor hemoglobin intermittently.    Moderate protein-calorie malnutrition (HCC)- (present on admission)  Assessment & Plan  - Continue dietary supplements.    Primary hypertension- (present on  admission)  Assessment & Plan  - Continue Norvasc and hydrochlorothiazide.  Off Coreg due to symptomatic bradycardia.  -Continue to optimize blood pressure control.    Stage 3b chronic kidney disease- (present on admission)  Assessment & Plan  - Creatinine now back to baseline.  -Continue to monitor renal function and electrolytes closely.  - avoid nephrotoxins, and continue to renally dose all medications.    Mixed hyperlipidemia- (present on admission)  Assessment & Plan  -Continue statin    ACP (advance care planning)- (present on admission)  Assessment & Plan  - Patient remains a full code.    Tobacco dependence- (present on admission)  Assessment & Plan  -Counseled on smoking cessation.  Continue nicotine replacement therapy.          DISCHARGE DIAGNOSES  Principal Problem:    Closed comminuted intertrochanteric fracture of proximal end of right femur (HCC) (POA: Yes)  Active Problems:    Symptomatic bradycardia (POA: Yes)    Fall (POA: Yes)    Acute renal failure superimposed on stage 3 chronic kidney disease (HCC) (POA: Yes)    Tobacco dependence (POA: Yes)    ACP (advance care planning) (POA: Yes)    Mixed hyperlipidemia (POA: Yes)    Stage 3b chronic kidney disease (POA: Yes)    Primary hypertension (POA: Yes)    Moderate protein-calorie malnutrition (HCC) (POA: Yes)    Macrocytic anemia (POA: Yes)  Resolved Problems:    * No resolved hospital problems. *      FOLLOW UP  No future appointments.  Jose Juan Beard M.D.  [address] [facility] [id number] [phone]    Follow up in 2 week(s)  Follow up    [facility]  [address] [address]  [phone]          MEDICATIONS ON DISCHARGE     Medication List        START taking these medications        Instructions   enoxaparin 30 MG/0.3ML Sosy inj  Commonly known as: Lovenox   Inject 0.3 mL under the skin every day for 28 days.  Dose: 30 mg     hydroCHLOROthiazide 12.5 MG tablet   Take 1 Tablet by mouth every  surgical, social and family histories were reviewed and updated as appropriate.    Review of Systems  Per HPI    Vitals:    06/18/24 1624   BP: 133/86   BP Location: LUE - Left upper extremity   Patient Position: Sitting   Cuff Size: Regular   Pulse: 90   Resp: 18   Temp: 97.6 °F (36.4 °C)   TempSrc: Temporal   SpO2: 96%   Weight: 79.3 kg (174 lb 15 oz)   Height: 5' 10\" (1.778 m)   PainSc:  0     Vital signs reviewed. Additional physical exam findings below in addition to any documented above.    Constitutional: no acute distress, sitting comfortably in chair  HEENT: head normocephalic and atraumatic  Cardiovascular: heart regular rate and regular rhythm  Respiratory: lungs clear to auscultation bilaterally, no respiratory distress  Abdomen: soft, nontender  Neurological: alert and oriented   Skin: warm, dry    Assessment and Plan  Problem List Items Addressed This Visit          Advance Directives and General Issues     Non-compliance       Cardiac and Vasculature    Atherosclerotic heart disease of native coronary artery without angina pectoris    Relevant Medications    atorvastatin (LIPITOR) 20 MG tablet    Other Relevant Orders    Lipid Panel With Reflex    History of non-ST elevation myocardial infarction (NSTEMI)    Relevant Medications    atorvastatin (LIPITOR) 20 MG tablet    Other Relevant Orders    Lipid Panel With Reflex    Hyperlipidemia    Relevant Medications    atorvastatin (LIPITOR) 20 MG tablet    Other Relevant Orders    Lipid Panel With Reflex    PVD (peripheral vascular disease) (CMD)    Relevant Medications    atorvastatin (LIPITOR) 20 MG tablet    Other Relevant Orders    Lipid Panel With Reflex       Endocrine and Metabolic    Overweight (BMI 25.0-29.9)    Relevant Orders    Comprehensive Metabolic Panel    Thyroid Stimulating Hormone Reflex    Glycohemoglobin    Lipid Panel With Reflex    Type 2 diabetes mellitus with hyperglycemia, with long-term current use of insulin  (CMD)    Relevant  Orders    Comprehensive Metabolic Panel    Glycohemoglobin    Lipid Panel With Reflex    Microalbumin Urine Random       Health Encounters    Annual physical exam - Primary    Relevant Orders    CBC with Automated Differential    Comprehensive Metabolic Panel    Thyroid Stimulating Hormone Reflex    Glycohemoglobin    Lipid Panel With Reflex    PSA     Other Visit Diagnoses       Prostate cancer screening        Relevant Orders    PSA    Need for hepatitis B screening test        Screening for colorectal cancer        Relevant Orders    OPEN ACCESS COLONOSCOPY          -Preventive Health   PSA: last PSA 0.44 on 8/5/22  Colorectal cancer screening: colonoscopy never completed, refused   Refused all vaccinations  Discussed importance of any missing vaccinations  Labs as ordered above    -T2DM  Last A1c 6.1% on 8/5/22 however previously 11.1%  Took self off insulin   Recommend repeat labs     -H/o NSTEMI   -CAD   History of [facility] in 2015 noted distal LAD 90%, treated with medical therapy  Has complication of right femoral artery dissection, treated with endarterectomy   Previously followed with cardiology Dr Luis     -HDL   Last  on 8/5/22   Has statin intolerance in the past  Discussed trial of low dose statin with view to titrate- pt unsure if will start      -Noncompliance   Not interested in taking medications as prescribed or completing screening recommendations     -Return in about 4 weeks (around 7/16/2024), or if symptoms worsen or fail to improve. or sooner as needed    No LOS data to display  (including reviewing the patient's chart, obtaining history, performing an exam, counseling the patient, coordinating care, and documenting clinical information in the EMR)     We discussed the risks and benefits of any medications started or modified as well as significant and common adverse effects to monitor as appropriate to problems addressed at today's visit.    We discussed healthy lifestyle modifications  day.  Dose: 12.5 mg     oxyCODONE immediate-release 5 MG Tabs  Commonly known as: Roxicodone   Take 1 Tablet by mouth every four hours as needed for Severe Pain for up to 15 days.  Dose: 5 mg     polyethylene glycol/lytes Pack  Commonly known as: Miralax   Take 1 Packet by mouth every day.  Dose: 17 g     senna-docusate 8.6-50 MG Tabs  Commonly known as: Pericolace Or Senokot S   Take 2 Tablets by mouth every evening.  Dose: 2 Tablet            CONTINUE taking these medications        Instructions   acetaminophen 325 MG Tabs  Commonly known as: Tylenol   Take 650 mg by mouth every four hours as needed for Mild Pain. 650 mg = 2 tablets  Dose: 650 mg     alendronate 10 MG Tabs  Commonly known as: Fosamax   Take 10 mg by mouth every morning before breakfast. Son does not know when last dose was taken  Dose: 10 mg     amLODIPine 5 MG Tabs  Commonly known as: Norvasc   Take 2 Tablets by mouth every day for 30 days.  Dose: 10 mg     aspirin 81 MG EC tablet   Take 1 Tablet by mouth every day for 30 days.  Dose: 81 mg     donepezil 5 MG Tabs  Commonly known as: Aricept   Take 10 mg by mouth every evening. Son does not know when last dose was taken  Dose: 10 mg     Ensure Complete Liqd   Take 1 Each by mouth every day.  Dose: 1 Each     ferrous sulfate 325 (65 Fe) MG tablet   Take 325 mg by mouth every day. Son does not know when last dose was taken  Dose: 325 mg     ipratropium-albuterol  MCG/ACT Aers  Commonly known as: Combivent Respimat   Inhale 1 Puff 4 times a day.  Dose: 1 Puff     omeprazole 20 MG delayed-release capsule  Commonly known as: PriLOSEC   Take 40 mg by mouth every day. 40 mg = 2 tablets  Dose: 40 mg     PARoxetine 20 MG Tabs  Commonly known as: Paxil   Take 40 mg by mouth every day.  Dose: 40 mg     Pro-Stat Liqd   Take 30 mL by mouth 2 times a day.  Dose: 30 mL     simvastatin 20 MG Tabs  Commonly known as: Zocor   Take 20 mg by mouth every evening. Son does not know when last dose was  including diet and regular exercise as pertinent to problems addressed at today's visit. In regards to diet, particularly to decrease salt, fat, and/or carbohydrate intake as appropriate to problems addressed at today's visit.     In addition to the above, as medically indicated and appropriate to problems addressed at today's visit, we discussed at length anticipatory guidance and indications to return to clinic for earlier appointment, go to urgent care, go to the nearest emergency room, and/or call 911.    Prior to discharge all questions and concerns were addressed.    Addison Tatum M.D.  Family Medicine    taken  Dose: 20 mg            STOP taking these medications      carvedilol 6.25 MG Tabs  Commonly known as: Coreg              Allergies  Allergies   Allergen Reactions    Codeine Nausea     nausea    Penicillins Rash     Rash  > 60 years ago       DIET  Orders Placed This Encounter   Procedures    Diet Order Diet: Regular     Standing Status:   Standing     Number of Occurrences:   1     Diet::   Regular [1]       ACTIVITY  As tolerated and directed by skilled nursing.  Weight bearing as tolerated    LINES, DRAINS, AND WOUNDS  This is an automated list. Peripheral IVs will be removed prior to discharge.  Peripheral IV 03/28/25 20 G Anterior;Left;Proximal Forearm (Active)   Site Assessment Clean;Dry;Intact 04/01/25 0900   Dressing Type Occlusive;Transparent 04/01/25 0900   Line Status Scrubbed the hub prior to access;Flushed;Saline locked 04/01/25 0900   Dressing Status Clean;Dry;Intact 04/01/25 0900   Dressing Intervention N/A 04/01/25 0900   Infiltration Grading ([facility], CV) 0 04/01/25 0900   Phlebitis Scale ([facility] Only) 0 04/01/25 0900     External Urinary Catheter (Female Wick) (Active)   Collection Container Suction container 03/31/25 0750   Output (mL) 0 mL 04/01/25 0505      Wound 03/28/25 Incision Hip Right ELIEZER, SILVER MEPILEX (Active)   Site Assessment NIMA 04/01/25 0728   Periwound Assessment NIMA 04/01/25 0728   Margins NIMA 04/01/25 0728   Closure NIMA 04/01/25 0728   Drainage Amount NIMA 03/31/25 2218   Drainage Description Sanguineous 03/31/25 0750   Dressing Status Clean;Dry;Intact 04/01/25 0728   Dressing Changed Observed 04/01/25 0728   Dressing Options [place] Dressing - Silver 04/01/25 0728       Peripheral IV 03/28/25 20 G Anterior;Left;Proximal Forearm (Active)   Site Assessment Clean;Dry;Intact 04/01/25 0900   Dressing Type Occlusive;Transparent 04/01/25 0900   Line Status Scrubbed the hub prior to access;Flushed;Saline locked 04/01/25 0900   Dressing Status Clean;Dry;Intact 04/01/25 0900    Dressing Intervention N/A 04/01/25 0900   Infiltration Grading ([facility], [facility]) 0 04/01/25 0900   Phlebitis Scale ([facility] Only) 0 04/01/25 0900               MENTAL STATUS ON TRANSFER      AOx3       CONSULTATIONS  Orthopedics    PROCEDURES  As above    LABORATORY  Lab Results   Component Value Date    SODIUM 140 04/02/2025    POTASSIUM 4.3 04/02/2025    CHLORIDE 106 04/02/2025    CO2 24 04/02/2025    GLUCOSE 107 (H) 04/02/2025    BUN 43 (H) 04/02/2025    CREATININE 1.41 (H) 04/02/2025        Lab Results   Component Value Date    WBC 6.7 04/02/2025    HEMOGLOBIN 8.6 (L) 04/02/2025    HEMATOCRIT 25.8 (L) 04/02/2025    PLATELETCT 237 04/02/2025        Total time of the discharge process = 36 minutes.

## 2025-04-01 NOTE — DISCHARGE PLANNING
[id number]  Agency/Facility Name: [facility]  Outcome: DPA called regarding pending insurance auth. Left VM with name and callback number.     [id number]  Agency/Facility Name: [facility]  Spoke To: Zoie  Outcome: DPA called regarding pending insurance auth.It is still pending at this time.     [id number]  Agency/Facility Name: [facility]  Spoke To: Zoie  Outcome: DPA received call stating they have received  insurance auth and requested a 1630 transportation time.

## 2025-04-01 NOTE — PROGRESS NOTES
Hospital Medicine Daily Progress Note    Date of Service  4/2/2025    Chief Complaint  symptomatic bradycardia, fall    Hospital Course  Coty Valdivia is a 79 y.o. female with hypertension, osteoporosis, GERD, dyslipidemia, depression, and CKD, recent admission for acute stroke and hypertensive emergency and was discharged to rehab on carvedilol and amlodipine, admitted on 3/26/2025 with fall, dizziness, and severe right hip pain.  She was noted to have bradycardia due to beta-blocker with heart rate initially in the 40s which improved to the 60s with beta-blocker washout.  Initial x-ray did not show any fracture but due to continued right hip pain, CT was pursued which showed femoral fracture.  Orthopedics was consulted and patient underwent ORIF of the right proximal fracture intertrochanteric with IMN on 3/28/2025.  She was placed on analgesics.  She did have ERROL, with subsequent improvement of renal function back to baseline.  PT/OT recommended postacute placement which was pursued.  She remained hemodynamically stable and afebrile.  Her pain was well-controlled.  She was placed on bowel regimen.  She was cleared for discharge by orthopedics.    Interval Problem Update  4/1/2025 - I reviewed the patient's chart. There were no significant overnight events. Remains hemodynamically stable and afebrile. Stable on 2.5L O2 NC.  Heart rate in the 60s.  BP in the 150s.    > I have personally seen and examined the patient today.  Has some pain on the right hip.  Had a bowel movement this morning.  No chest pain or shortness of breath.  No GI complaints.    UPDATE:  Later in the afternoon, patient complained of chest pain.  On exam, was reproducible.  EKG was obtained which I personally reviewed which did not show any dynamic ischemic changes.  Chest x-ray (my read) did not show any consolidation or infiltrates.  Workup so far nonischemic, likely musculoskeletal.  Trending troponins.  Hold discharge for now.    I  personally reviewed all lab results mentioned above. Prior medical records from this institution and outside facilities were independently reviewed as noted. I also personally reviewed all ER physician and consultant recommendations and plans as documented above. History was independently obtained by myself. I have discussed this patient's plan of care and discharge plan at IDT rounds today with Case Management, Nursing, Nursing leadership, and other members of the IDT team.      Code Status  Full Code    Disposition  The patient is not medically cleared for discharge to home or a post-acute facility.      Pending SNF placement, insurance authorization ([place]).    I have placed the appropriate orders for post-discharge needs.    Review of Systems  ROS     Pertinent positives/negatives as mentioned above.     A complete review of systems was personally done by me. All other systems were negative.       Physical Exam  Temp:  [36.6 °C (97.9 °F)-37 °C (98.6 °F)] 36.7 °C (98.1 °F)  Pulse:  [63-70] 63  Resp:  [15-22] 16  BP: (131-163)/(53-67) 141/53  SpO2:  [93 %-99 %] 94 %    Physical Exam  Vitals reviewed.   Constitutional:       General: She is not in acute distress.     Appearance: Normal appearance. She is normal weight. She is not ill-appearing or diaphoretic.      Comments: Frail chronically ill-appearing   HENT:      Head: Normocephalic and atraumatic.      Right Ear: External ear normal.      Left Ear: External ear normal.      Mouth/Throat:      Mouth: Mucous membranes are moist.      Pharynx: No oropharyngeal exudate or posterior oropharyngeal erythema.   Eyes:      General: No scleral icterus.     Extraocular Movements: Extraocular movements intact.      Conjunctiva/sclera: Conjunctivae normal.      Pupils: Pupils are equal, round, and reactive to light.   Cardiovascular:      Rate and Rhythm: Normal rate and regular rhythm.      Heart sounds: Normal heart sounds. No murmur heard.  Pulmonary:      Effort:  Pulmonary effort is normal. No respiratory distress.      Breath sounds: No stridor. No wheezing, rhonchi or rales.   Chest:      Chest wall: No tenderness.   Abdominal:      General: Bowel sounds are normal. There is no distension.      Palpations: Abdomen is soft. There is no mass.      Tenderness: There is no abdominal tenderness. There is no guarding or rebound.   Musculoskeletal:         General: No swelling. Normal range of motion.      Cervical back: Normal range of motion and neck supple. No rigidity. No muscular tenderness.      Right lower leg: No edema.      Left lower leg: No edema.      Comments: Right hip surgical site intact, dressing CDI   Lymphadenopathy:      Cervical: No cervical adenopathy.   Skin:     General: Skin is warm and dry.      Coloration: Skin is not jaundiced.      Findings: No rash.   Neurological:      General: No focal deficit present.      Mental Status: She is alert and oriented to person, place, and time. Mental status is at baseline.      Cranial Nerves: No cranial nerve deficit.   Psychiatric:         Mood and Affect: Mood normal.         Behavior: Behavior is slowed.         Thought Content: Thought content normal.         Judgment: Judgment normal.         Fluids    Intake/Output Summary (Last 24 hours) at 4/2/2025 0710  Last data filed at 4/1/2025 1300  Gross per 24 hour   Intake --   Output 400 ml   Net -400 ml        Laboratory  Recent Labs     03/31/25  0812 04/01/25  0242   WBC 6.6 7.0   RBC 2.77* 2.73*   HEMOGLOBIN 9.1* 8.8*   HEMATOCRIT 26.7* 26.7*   MCV 96.4 97.8   MCH 32.9 32.2   MCHC 34.1 33.0   RDW 49.3 50.0   PLATELETCT 165 175   MPV 10.2 10.3     Recent Labs     03/31/25  0812 04/01/25  0242   SODIUM 138 139   POTASSIUM 5.0 5.0   CHLORIDE 107 107   CO2 22 22   GLUCOSE 101* 86   BUN 44* 45*   CREATININE 1.63* 1.42*   CALCIUM 8.8 8.7                   Imaging  DX-CHEST-PORTABLE (1 VIEW)   Final Result      1.  Hyperinflation suggesting COPD.   2.  No pneumonia or  pneumothorax.      DX-PORTABLE FLUORO > 1 HOUR   Final Result      Portable fluoroscopy utilized for 58 seconds.         INTERPRETING LOCATION: [address] [place] [address], [place], [address]      DX-HIP-UNILATERAL-W/O PELVIS-2/3 VIEWS RIGHT   Final Result      Digitized intraoperative radiograph is submitted for review.  This examination is not for diagnostic purpose but for guidance during a surgical procedure. Please see the patient's chart for full procedural details.      CT-HIP W/O PLUS RECONS RIGHT   Final Result      Comminuted right intertrochanteric femur fracture.      CT-HEAD W/O   Final Result      No acute process. Moderate chronic ischemic white matter demyelination.               DX-HIP-UNILATERAL-WITH PELVIS-1 VIEW RIGHT   Final Result      No acute process.           Assessment/Plan  * Closed comminuted intertrochanteric fracture of proximal end of right femur (HCC)- (present on admission)  Assessment & Plan  - now s/p ORIF of the right proximal fracture intertrochanteric with IMN on 3/28/2025.   - continue pain control with oral oxycodone and IV morphine. Monitor for narcotic side effects particularly respiratory depression, AMS, and constipation.    -Continue to monitor for ABLA postoperatively.  Check hemoglobin daily.  - Continue pharmacologic DVT prophylaxis while in the hospital.  Continue heparin SQ. Patient had high-risk above the knee procedure. Pt is Weightbearing as tolerated on the affected extremity. Will need on-going DVT prophylaxis on discharge.  - PT/OT recommending postacute placement, pending insurance authorization.      Symptomatic bradycardia- (present on admission)  Assessment & Plan  - Presents with fall preceded by dizziness, was bradycardic initially. Was started on carvedilol following last admission for hypertensive emergency and acute stroke earlier this month.  -Carvedilol has been discontinued.  Bradycardia has improved, heart rate now in the 60s.  Symptoms have resolved.  -Avoid AV  josefina blockers in the future.    Acute renal failure superimposed on stage 3 chronic kidney disease (HCC)- (present on admission)  Assessment & Plan  - Improved.  Now back to baseline.    Fall- (present on admission)  Assessment & Plan  - PT/OT recommended postacute placement.  Pursuing SNF placement, pending insurance authorization.    Macrocytic anemia- (present on admission)  Assessment & Plan  -With component of acute blood loss anemia postoperatively  -No clinical signs of active bleeding at this time, hemoglobin stable.  - Restrictive transfusion strategy.  Transfuse if hemoglobin drops below 7.  CBC in the morning.      Moderate protein-calorie malnutrition (HCC)- (present on admission)  Assessment & Plan  - Continue dietary supplements.    Primary hypertension- (present on admission)  Assessment & Plan  - Continue Norvasc.  Off Coreg due to symptomatic bradycardia.  -Start hydrochlorothiazide 12.5 mg daily.  -Monitor blood pressure trend closely, continue as needed IV hydralazine for significant symptomatic hypertension.  Optimize blood pressure control.    Stage 3b chronic kidney disease- (present on admission)  Assessment & Plan  - Creatinine now back to baseline.  -Continue to monitor renal function and electrolytes closely.  - avoid nephrotoxins, and continue to renally dose all medications.    Mixed hyperlipidemia- (present on admission)  Assessment & Plan  -Continue statin    ACP (advance care planning)- (present on admission)  Assessment & Plan  - Patient remains a full code.    Tobacco dependence- (present on admission)  Assessment & Plan  -Counseled on smoking cessation.  Continue nicotine replacement therapy.         VTE prophylaxis: Heparin SQ    My total time spent caring for the patient on the day of the encounter was 53 minutes. This does not include time spent on separately billable procedures/tests.

## 2025-04-01 NOTE — CARE PLAN
Problem: Pain - Standard  Goal: Alleviation of pain or a reduction in pain to the patient’s comfort goal  Outcome: Progressing     Problem: Knowledge Deficit - Standard  Goal: Patient and family/care givers will demonstrate understanding of plan of care, disease process/condition, diagnostic tests and medications  Outcome: Progressing     Problem: Fall Risk  Goal: Patient will remain free from falls  Outcome: Progressing       The patient is Stable - Low risk of patient condition declining or worsening    Shift Goals  Clinical Goals: Pain control, safety  Patient Goals: Comfort  Family Goals: not present    Progress made toward(s) clinical / shift goals:  Taught pt 0-10 pain scale and non-pharmacological method of pain management, encouraged to verbalize when in pain. Administered PRN pain meds as needed. Bed locked and in lowest position. Bed alarm on. Safety and fall precautions in place. Hourly rounding. Call light and belongings within reach.     Patient is not progressing towards the following goals:

## 2025-04-02 ENCOUNTER — APPOINTMENT (OUTPATIENT)
Dept: RADIOLOGY | Facility: MEDICAL CENTER | Age: 80
DRG: 481 | End: 2025-04-02
Attending: INTERNAL MEDICINE
Payer: MEDICARE

## 2025-04-02 VITALS
SYSTOLIC BLOOD PRESSURE: 147 MMHG | RESPIRATION RATE: 13 BRPM | HEART RATE: 63 BPM | HEIGHT: 62 IN | WEIGHT: 106.26 LBS | BODY MASS INDEX: 19.55 KG/M2 | TEMPERATURE: 97.9 F | DIASTOLIC BLOOD PRESSURE: 61 MMHG | OXYGEN SATURATION: 100 %

## 2025-04-02 LAB
ANION GAP SERPL CALC-SCNC: 10 MMOL/L (ref 7–16)
BUN SERPL-MCNC: 43 MG/DL (ref 8–22)
CALCIUM SERPL-MCNC: 8.8 MG/DL (ref 8.5–10.5)
CHLORIDE SERPL-SCNC: 106 MMOL/L (ref 96–112)
CO2 SERPL-SCNC: 24 MMOL/L (ref 20–33)
CREAT SERPL-MCNC: 1.41 MG/DL (ref 0.5–1.4)
ERYTHROCYTE [DISTWIDTH] IN BLOOD BY AUTOMATED COUNT: 50 FL (ref 35.9–50)
GFR SERPLBLD CREATININE-BSD FMLA CKD-EPI: 38 ML/MIN/1.73 M 2
GLUCOSE SERPL-MCNC: 107 MG/DL (ref 65–99)
HCT VFR BLD AUTO: 25.8 % (ref 37–47)
HGB BLD-MCNC: 8.6 G/DL (ref 12–16)
MCH RBC QN AUTO: 32.5 PG (ref 27–33)
MCHC RBC AUTO-ENTMCNC: 33.3 G/DL (ref 32.2–35.5)
MCV RBC AUTO: 97.4 FL (ref 81.4–97.8)
PLATELET # BLD AUTO: 237 K/UL (ref 164–446)
PMV BLD AUTO: 9.8 FL (ref 9–12.9)
POTASSIUM SERPL-SCNC: 4.3 MMOL/L (ref 3.6–5.5)
RBC # BLD AUTO: 2.65 M/UL (ref 4.2–5.4)
SODIUM SERPL-SCNC: 140 MMOL/L (ref 135–145)
WBC # BLD AUTO: 6.7 K/UL (ref 4.8–10.8)

## 2025-04-02 PROCEDURE — 700102 HCHG RX REV CODE 250 W/ 637 OVERRIDE(OP): Performed by: INTERNAL MEDICINE

## 2025-04-02 PROCEDURE — A9270 NON-COVERED ITEM OR SERVICE: HCPCS | Performed by: STUDENT IN AN ORGANIZED HEALTH CARE EDUCATION/TRAINING PROGRAM

## 2025-04-02 PROCEDURE — 71275 CT ANGIOGRAPHY CHEST: CPT

## 2025-04-02 PROCEDURE — 80048 BASIC METABOLIC PNL TOTAL CA: CPT

## 2025-04-02 PROCEDURE — 97530 THERAPEUTIC ACTIVITIES: CPT

## 2025-04-02 PROCEDURE — A9270 NON-COVERED ITEM OR SERVICE: HCPCS | Performed by: INTERNAL MEDICINE

## 2025-04-02 PROCEDURE — 700102 HCHG RX REV CODE 250 W/ 637 OVERRIDE(OP): Performed by: STUDENT IN AN ORGANIZED HEALTH CARE EDUCATION/TRAINING PROGRAM

## 2025-04-02 PROCEDURE — 97116 GAIT TRAINING THERAPY: CPT

## 2025-04-02 PROCEDURE — 700117 HCHG RX CONTRAST REV CODE 255: Performed by: INTERNAL MEDICINE

## 2025-04-02 PROCEDURE — 85027 COMPLETE CBC AUTOMATED: CPT

## 2025-04-02 PROCEDURE — 36415 COLL VENOUS BLD VENIPUNCTURE: CPT

## 2025-04-02 PROCEDURE — 99239 HOSP IP/OBS DSCHRG MGMT >30: CPT | Performed by: INTERNAL MEDICINE

## 2025-04-02 PROCEDURE — 700111 HCHG RX REV CODE 636 W/ 250 OVERRIDE (IP): Performed by: HOSPITALIST

## 2025-04-02 RX ADMIN — HEPARIN SODIUM 5000 UNITS: 5000 INJECTION, SOLUTION INTRAVENOUS; SUBCUTANEOUS at 04:59

## 2025-04-02 RX ADMIN — TRAMADOL HYDROCHLORIDE 50 MG: 50 TABLET, COATED ORAL at 15:07

## 2025-04-02 RX ADMIN — PAROXETINE HYDROCHLORIDE 40 MG: 20 TABLET, FILM COATED ORAL at 05:00

## 2025-04-02 RX ADMIN — OMEPRAZOLE 40 MG: 20 CAPSULE, DELAYED RELEASE ORAL at 05:00

## 2025-04-02 RX ADMIN — POLYETHYLENE GLYCOL 3350 1 PACKET: 17 POWDER, FOR SOLUTION ORAL at 05:00

## 2025-04-02 RX ADMIN — HYDROCHLOROTHIAZIDE 12.5 MG: 12.5 TABLET ORAL at 05:00

## 2025-04-02 RX ADMIN — AMLODIPINE BESYLATE 10 MG: 10 TABLET ORAL at 04:59

## 2025-04-02 RX ADMIN — ASPIRIN 81 MG: 81 TABLET, COATED ORAL at 04:59

## 2025-04-02 RX ADMIN — OXYCODONE HYDROCHLORIDE 5 MG: 5 TABLET ORAL at 05:00

## 2025-04-02 RX ADMIN — NICOTINE 14 MG: 14 PATCH TRANSDERMAL at 05:00

## 2025-04-02 RX ADMIN — IOHEXOL 60 ML: 350 INJECTION, SOLUTION INTRAVENOUS at 10:00

## 2025-04-02 RX ADMIN — TRAMADOL HYDROCHLORIDE 50 MG: 50 TABLET, COATED ORAL at 08:32

## 2025-04-02 ASSESSMENT — COGNITIVE AND FUNCTIONAL STATUS - GENERAL
TURNING FROM BACK TO SIDE WHILE IN FLAT BAD: A LITTLE
MOVING FROM LYING ON BACK TO SITTING ON SIDE OF FLAT BED: A LOT
CLIMB 3 TO 5 STEPS WITH RAILING: TOTAL
MOBILITY SCORE: 13
MOVING TO AND FROM BED TO CHAIR: A LITTLE
SUGGESTED CMS G CODE MODIFIER MOBILITY: CL
WALKING IN HOSPITAL ROOM: A LOT
STANDING UP FROM CHAIR USING ARMS: A LOT

## 2025-04-02 ASSESSMENT — PAIN DESCRIPTION - PAIN TYPE
TYPE: ACUTE PAIN
TYPE: ACUTE PAIN;SURGICAL PAIN
TYPE: ACUTE PAIN

## 2025-04-02 ASSESSMENT — GAIT ASSESSMENTS
ASSISTIVE DEVICE: FRONT WHEEL WALKER
GAIT LEVEL OF ASSIST: MODERATE ASSIST
DEVIATION: ANTALGIC;STEP TO
DISTANCE (FEET): 5

## 2025-04-02 NOTE — DISCHARGE PLANNING
DC Transport Scheduled    Transport Company Scheduled:  [employer]  Spoke with Clary sevilla [facility] to schedule transport.    Scheduled Date: 4/2/2025  Scheduled Time: 1500    Transport Type: [facility]  [facility] [address] [facility] [place]    Notified care team of scheduled transport via [employer].     If there are any changes needed to the DC transportation scheduled, please contact [facility] at ext. [phone] between the hours of 8529-7005. If outside those hours, contact the ED Case Manager at ext. [phone].

## 2025-04-02 NOTE — THERAPY
Physical Therapy   Daily Treatment     Patient Name: Coty Valdivia  Age:  79 y.o., Sex:  female  Medical Record #: [id number]  Today's Date: 4/2/2025     Precautions  Precautions: (P) Fall Risk;Weight Bearing As Tolerated Right Lower Extremity  Comments: (P) R hip fx, IMN    Assessment    Pt was agreeable to PT session.  She was able to ambulate 5' forward and backward today, limited by pain with the RLE buckling during weight bearing.  Pt requires cueing to sequence mobility with encouragement to use the RLE for mobility to facilitate recovery.  Pt is most limited by pain with impaired memory limiting carry over.  PT will continue to follow.  Recommend post acute PT services.    Plan    Treatment Plan Status: (P) Continue Current Treatment Plan  Type of Treatment: Bed Mobility, Gait Training, Neuro Re-Education / Balance, Self Care / Home Evaluation, Stair Training, Therapeutic Activities, Therapeutic Exercise  Treatment Frequency: 5 Times per Week  Treatment Duration: Until Therapy Goals Met    DC Equipment Recommendations: (P) Unable to determine at this time  Discharge Recommendations: (P) Recommend post-acute placement for additional physical therapy services prior to discharge home         Objective       04/02/25 1135   Precautions   Precautions Fall Risk;Weight Bearing As Tolerated Right Lower Extremity   Comments R hip fx, IMN   Vitals   O2 (LPM) 2   O2 Delivery Device Silicone Nasal Cannula   Pain 0 - 10 Group   Therapist Pain Assessment During Activity  (R hip- not rated)   Cognition    Speech/ Communication Hard of Hearing   Level of Consciousness Alert   New Learning Impaired   Balance   Sitting Balance (Static) Fair   Sitting Balance (Dynamic) Fair   Standing Balance (Static) Fair -   Standing Balance (Dynamic) Poor +   Weight Shift Sitting Fair   Weight Shift Standing Poor   Comments with FWW   Bed Mobility    Supine to Sit Minimal Assist   Sit to Supine Moderate Assist   Scooting Contact Guard Assist    Rolling Minimal Assist to Rt.;Minimum Assist to Lt.   Skilled Intervention Sequencing;Tactile Cuing;Verbal Cuing   Comments HOB partially elevated, rail   Gait Analysis   Gait Level Of Assist Moderate Assist   Assistive Device Front Wheel Walker   Distance (Feet) 5  (fwd/ bkwd)   # of Times Distance was Traveled 1   Deviation Antalgic;Step To  (R  leg buckling with WB, discontinuous steps)   Weight Bearing Status WBAT RLE   Comments standing weight shifts with TCs to keep R knee straight   Functional Mobility   Sit to Stand Minimal Assist  (x3 trials)   Transfer Method Stand Step   Mobility with FWW   Skilled Intervention Sequencing;Verbal Cuing;Tactile Cuing   Short Term Goals    Short Term Goal # 1 Pt will complete bed mobility with supervision from a flat bed to progress function in 6 visits.   Goal Outcome # 1 goal not met   Short Term Goal # 2 Pt will transfer with FWW and supervision to progress function in 6 visits.   Goal Outcome # 2 Goal not met   Short Term Goal # 3 Pt will ambulate 50ft with FWW and supervision to progress function in 6 visits.   Goal Outcome # 3 Goal not met   Physical Therapy Treatment Plan   Physical Therapy Treatment Plan Continue Current Treatment Plan   Anticipated Discharge Equipment and Recommendations   DC Equipment Recommendations Unable to determine at this time   Discharge Recommendations Recommend post-acute placement for additional physical therapy services prior to discharge home   Interdisciplinary Plan of Care Collaboration   IDT Collaboration with  Nursing   Patient Position at End of Therapy In Bed;Call Light within Reach;Tray Table within Reach;Bed Alarm On   Collaboration Comments RN updated   Session Information   Date / Session Number  4/2 -3 (3/5, 4/4)

## 2025-04-02 NOTE — PROGRESS NOTES
"    Orthopedic PA Progress Note    Interval changes:  Patient doing ok. Plan for dc to SNF per chart when medically cleared   R hip dressings are CDI  WBAT RLE  No pending ortho procedures  Cleared for DC from orthopedic standpoint pending therapy recs and medical optimization    ROS - Patient denies any new issues.  Denies any numbness or tingling. Pain controlled.    BP (!) 147/55   Pulse 69   Temp 36.6 °C (97.9 °F)   Resp (!) 22   Ht 1.57 m (5' 1.81\")   Wt 48.2 kg (106 lb 4.2 oz)   SpO2 93%     Patient seen and examined  No acute distress  Breathing non labored  RRR  RLE: Surgical dressing is clean, dry, and intact. Patient clearly fires tibialis anterior, EHL, and gastrocnemius/soleus. Sensation is intact to light touch throughout superficial peroneal, deep peroneal, tibial, saphenous, and sural nerve distributions. Strong and palpable 2+ dorsalis pedis and posterior tibial pulses with capillary refill less than 2 seconds.   Recent Labs     03/30/25  0029 03/31/25  0812 04/01/25  0242   WBC 9.1 6.6 7.0   RBC 2.75* 2.77* 2.73*   HEMOGLOBIN 9.0* 9.1* 8.8*   HEMATOCRIT 27.1* 26.7* 26.7*   MCV 98.5* 96.4 97.8   MCH 32.7 32.9 32.2   MCHC 33.2 34.1 33.0   RDW 50.4* 49.3 50.0   PLATELETCT 142* 165 175   MPV 10.9 10.2 10.3       Active Hospital Problems    Diagnosis     Macrocytic anemia [D53.9]     Acute renal failure superimposed on stage 3 chronic kidney disease (HCC) [N17.9, N18.30]     Moderate protein-calorie malnutrition (HCC) [E44.0]     Closed comminuted intertrochanteric fracture of proximal end of right femur (HCC) [S72.141A]     Symptomatic bradycardia [R00.1]     Stage 3b chronic kidney disease [N18.32]     Primary hypertension [I10]     Fall [W19.XXXA]     Mixed hyperlipidemia [E78.2]     Tobacco dependence [F17.200]     ACP (advance care planning) [Z71.89]        DX-CHEST-PORTABLE (1 VIEW)   Final Result      1.  Hyperinflation suggesting COPD.   2.  No pneumonia or pneumothorax.      DX-PORTABLE " FLUORO > 1 HOUR   Final Result      Portable fluoroscopy utilized for 58 seconds.         INTERPRETING LOCATION: [address] [place] [address], [place], [address]      DX-HIP-UNILATERAL-W/O PELVIS-2/3 VIEWS RIGHT   Final Result      Digitized intraoperative radiograph is submitted for review.  This examination is not for diagnostic purpose but for guidance during a surgical procedure. Please see the patient's chart for full procedural details.      CT-HIP W/O PLUS RECONS RIGHT   Final Result      Comminuted right intertrochanteric femur fracture.      CT-HEAD W/O   Final Result      No acute process. Moderate chronic ischemic white matter demyelination.               DX-HIP-UNILATERAL-WITH PELVIS-1 VIEW RIGHT   Final Result      No acute process.          Assessment/Plan:  Patient doing ok. Plan for dc to SNF per chart when medically cleared   R hip dressings are CDI  WBAT RLE  No pending ortho procedures  Cleared for DC from orthopedic standpoint pending therapy recs and medical optimization    POD#4 S/p  Open treatment of Right proximal femur fracture intertrochanteric with IMN   Wt bearing status - WBAT RLE  Future Procedures - None planned   Wound care/Drains - Dressings to be changed every other day by nursing. Or PRN for saturation starting POD#2  Sutures/Staples out- 14-21 days post operatively. Removal will completed by ortho TUNDE's unless transferred.  Inpatient DVT prophylaxis: heparin  DVT Prophylaxis outpatient: ASA 81 mg PO BID x4 weeks  PT/OT-initiated  Antibiotics:  Perioperative completed  DVT Prophylaxis- TEDS/SCDs/Foot pumps  Case Coordination for Discharge Planning - Disposition per therapy recs.   Follow up with Dr. Beard 2 weeks following final surgery for repeat imaging and wound check. (Est follow up date 4/11)

## 2025-04-02 NOTE — PROGRESS NOTES
1634 Rapid response called for pt complaint of chest pain. Rapid team at bedside. [facility] transport cancelled at this time. [facility] and son Dominic informed. New orders received.

## 2025-04-02 NOTE — PROGRESS NOTES
Report given to Yareli at [facility]. Pt transporting via [facility]. Cell phone and glasses sent with pt. Aox3, GCS 15. IV removed

## 2025-04-02 NOTE — PROGRESS NOTES
4 Eyes Skin Assessment Completed by ALEXEY Daley and BETTY Dunbar.    Head WDL  Ears WDL  Nose WDL  Mouth WDL  Neck WDL  Breast/Chest WDL  Shoulder Blades WDL  Spine WDL  (R) Arm/Elbow/Hand WDL  (L) Arm/Elbow/Hand Bruising  Abdomen Bruising  Groin WDL  Scrotum/Coccyx/Buttocks Redness and Blanching  (R) Leg Bruising and Incision  (L) Leg WDL  (R) Heel/Foot/Toe Redness and Blanching  (L) Heel/Foot/Toe Redness and Blanching          Devices In Places Pulse Ox and Nasal Cannula      Interventions In Place Gray Ear Foams, NC W/Ear Foams, Heel Mepilex, Pillows, Q2 Turns, and Heels Loaded W/Pillows    Possible Skin Injury Yes    Pictures Uploaded Into Epic Yes  Wound Consult Placed N/A  RN Wound Prevention Protocol Ordered Yes           Left ankle, blanching redness

## 2025-04-02 NOTE — CARE PLAN
The patient is Stable - Low risk of patient condition declining or worsening    Shift Goals  Clinical Goals: Pain control, safety, skin integ  Patient Goals: comfort, go home  Family Goals: N/A    Progress made toward(s) clinical / shift goals:        Problem: Pain - Standard  Goal: Alleviation of pain or a reduction in pain to the patient’s comfort goal  Outcome: Progressing     Problem: Knowledge Deficit - Standard  Goal: Patient and family/care givers will demonstrate understanding of plan of care, disease process/condition, diagnostic tests and medications  Outcome: Progressing     Problem: Skin Integrity  Goal: Skin integrity is maintained or improved  Outcome: Progressing     Problem: Fall Risk  Goal: Patient will remain free from falls  Outcome: Progressing     Problem: Wound/ / Incision Healing  Goal: Patient's wound/surgical incision will decrease in size and heals properly  Outcome: Progressing     Problem: Urinary Elimination  Goal: Establish and maintain regular urinary output  Outcome: Progressing     Problem: Mobility  Goal: Patient's capacity to carry out activities will improve  Outcome: Progressing     Problem: Self Care  Goal: Patient will have the ability to perform ADLs independently or with assistance (bathe, groom, dress, toilet and feed)  Outcome: Progressing     Problem: Infection - Standard  Goal: Patient will remain free from infection  Outcome: Progressing       Patient is not progressing towards the following goals:

## 2025-04-02 NOTE — DISCHARGE PLANNING
0954  Agency/Facility Name: [facility]  Outcome: DPA called regarding bed availability. Left VM with name and callback number.     1025  Agency/Facility Name: [facility]  Spoke To: Zoie  Outcome: DPA received call from facility stating there is a bed available and requested a 1500 transportation time.

## 2025-04-02 NOTE — PROGRESS NOTES
Rapid Response Summary     Rapid response called at 1633 for: Chest Pain     VS: WDL (See Vitals Flowsheet)  MD Paged:   Interventions:    Imaging/Tests: Chest X-ray and EKG    Labs: Troponin and D-dimer   Other: PIV started   Disposition: Improved with rapid response team interventions. Primary RN updated on plan of care. Transfer not indicated at this time.     EKG and chest x-ray were completed.  accessed both and had no concerns. Awaiting troponin and d-dimer result. Okay to end rapid per .

## 2025-04-02 NOTE — DISCHARGE PLANNING
Case Management Discharge Planning    Admission Date: 3/26/2025  GMLOS: 4.4  ALOS: 6    6-Clicks ADL Score: 15  6-Clicks Mobility Score: 14  PT and/or OT Eval ordered: Yes  Post-acute Referrals Ordered: Yes  Post-acute Choice Obtained: Yes  Has referral(s) been sent to post-acute provider:  Yes      Anticipated Discharge Dispo: Discharge Disposition: D/T to SNF with Medicare cert in anticipation of skilled care (03)    DME Needed: No    Action(s) Taken: Updated Provider/Nurse on Discharge Plan    1100, Pt was discussed in IDT rounds. Pt medically cleared for discharge to [facility] per Dr. ERLIN Cottrell.    1025, Pt's son Dominic was informed through telephone that Pt is for discharge to [facility] at 1500 today via [facility]. He is in agreement to plan.    1035, [facility] packet given to ABBI BRAR.    1040, Pt was informed of transfer to [facility] at 1500 and she is in agreement to plan.    Escalations Completed: None    Medically Clear: Yes    Next Steps: CM will continue to assist Pt with discharge needs.      Barriers to Discharge: None           Spoke with pt, he tells me that he received a form from his job that needs to be filled out. He got a copy and his job was also sending 1 to us.  I dont think we have received that.  He is faxing a copy to EM tomorrow am for us to fill out.     I did also call forms completion and they have no active paperwork for him.

## (undated) DEVICE — SET LEADWIRE 5 LEAD BEDSIDE DISPOSABLE ECG (1SET OF 5/EA)

## (undated) DEVICE — CANISTER SUCTION 3000ML MECHANICAL FILTER AUTO SHUTOFF MEDI-VAC NONSTERILE LF DISP (40EA/CA)

## (undated) DEVICE — TUBING CLEARLINK DUO-VENT - C-FLO (48EA/CA)

## (undated) DEVICE — LACTATED RINGERS INJ 1000 ML - (14EA/CA 60CA/PF)

## (undated) DEVICE — GLOVE BIOGEL PI INDICATOR SZ 7.5 SURGICAL PF LF -(50/BX 4BX/CA)

## (undated) DEVICE — DRAPE 36X28IN RAD CARM BND BG - (25/CA)

## (undated) DEVICE — SLEEVE, VASO, THIGH, MED

## (undated) DEVICE — GOWN WARMING STANDARD FLEX - (30/CA)

## (undated) DEVICE — SUCTION INSTRUMENT YANKAUER BULBOUS TIP W/O VENT (50EA/CA)

## (undated) DEVICE — SENSOR OXIMETER ADULT SPO2 RD SET (20EA/BX)

## (undated) DEVICE — SODIUM CHL IRRIGATION 0.9% 1000ML (12EA/CA)

## (undated) DEVICE — GLOVE BIOGEL PI ORTHO SZ 7.5 PF LF (40PR/BX)

## (undated) DEVICE — GOWN SURGEONS X-LARGE - DISP. (30/CA)

## (undated) DEVICE — SUTURE 2-0 VICRYL PLUS CT-1 36 (36PK/BX)"

## (undated) DEVICE — PACK MAJOR ORTHO TRAUMA- (3EA/CA)

## (undated) DEVICE — DRESSING TRANSPARENT FILM TEGADERM 4 X 4.75" (50EA/BX)"

## (undated) DEVICE — SUTURE GENERAL

## (undated) DEVICE — LOCKING DRILL 4.2X360MM

## (undated) DEVICE — STAPLER 35MM SKIN WIDE ROTATING HEAD (6EA/BX)

## (undated) DEVICE — BAG SPONGE COUNT 10.25 X 32 - BLUE (250/CA)

## (undated) DEVICE — SET EXTENSION WITH 2 PORTS (48EA/CA) ***PART #2C8610 IS A SUBSTITUTE*****